# Patient Record
Sex: FEMALE | Race: BLACK OR AFRICAN AMERICAN | NOT HISPANIC OR LATINO | URBAN - METROPOLITAN AREA
[De-identification: names, ages, dates, MRNs, and addresses within clinical notes are randomized per-mention and may not be internally consistent; named-entity substitution may affect disease eponyms.]

---

## 2017-01-06 ENCOUNTER — OUTPATIENT (OUTPATIENT)
Dept: OUTPATIENT SERVICES | Facility: HOSPITAL | Age: 62
LOS: 1 days | End: 2017-01-06
Payer: COMMERCIAL

## 2017-01-06 ENCOUNTER — APPOINTMENT (OUTPATIENT)
Dept: THORACIC SURGERY | Facility: CLINIC | Age: 62
End: 2017-01-06

## 2017-01-06 VITALS
OXYGEN SATURATION: 97 % | WEIGHT: 187 LBS | HEIGHT: 69 IN | BODY MASS INDEX: 27.7 KG/M2 | RESPIRATION RATE: 18 BRPM | SYSTOLIC BLOOD PRESSURE: 119 MMHG | HEART RATE: 81 BPM | TEMPERATURE: 97.6 F | DIASTOLIC BLOOD PRESSURE: 78 MMHG

## 2017-01-06 DIAGNOSIS — Z98.890 OTHER SPECIFIED POSTPROCEDURAL STATES: Chronic | ICD-10-CM

## 2017-01-06 DIAGNOSIS — Z98.89 OTHER SPECIFIED POSTPROCEDURAL STATES: Chronic | ICD-10-CM

## 2017-01-06 DIAGNOSIS — Z41.9 ENCOUNTER FOR PROCEDURE FOR PURPOSES OTHER THAN REMEDYING HEALTH STATE, UNSPECIFIED: Chronic | ICD-10-CM

## 2017-01-06 PROCEDURE — 71020: CPT | Mod: 26

## 2017-01-06 PROCEDURE — 71046 X-RAY EXAM CHEST 2 VIEWS: CPT

## 2017-01-08 ENCOUNTER — RESULT CHARGE (OUTPATIENT)
Age: 62
End: 2017-01-08

## 2017-01-09 ENCOUNTER — OTHER (OUTPATIENT)
Age: 62
End: 2017-01-09

## 2017-01-09 ENCOUNTER — APPOINTMENT (OUTPATIENT)
Dept: OTOLARYNGOLOGY | Facility: CLINIC | Age: 62
End: 2017-01-09

## 2017-01-09 VITALS
WEIGHT: 187 LBS | BODY MASS INDEX: 27.7 KG/M2 | HEART RATE: 72 BPM | DIASTOLIC BLOOD PRESSURE: 83 MMHG | HEIGHT: 69 IN | SYSTOLIC BLOOD PRESSURE: 147 MMHG

## 2017-01-17 ENCOUNTER — OUTPATIENT (OUTPATIENT)
Dept: OUTPATIENT SERVICES | Facility: HOSPITAL | Age: 62
LOS: 1 days | End: 2017-01-17
Payer: COMMERCIAL

## 2017-01-17 DIAGNOSIS — C02.9 MALIGNANT NEOPLASM OF TONGUE, UNSPECIFIED: ICD-10-CM

## 2017-01-17 DIAGNOSIS — Z41.9 ENCOUNTER FOR PROCEDURE FOR PURPOSES OTHER THAN REMEDYING HEALTH STATE, UNSPECIFIED: Chronic | ICD-10-CM

## 2017-01-17 DIAGNOSIS — Z98.89 OTHER SPECIFIED POSTPROCEDURAL STATES: Chronic | ICD-10-CM

## 2017-01-17 DIAGNOSIS — Z98.890 OTHER SPECIFIED POSTPROCEDURAL STATES: Chronic | ICD-10-CM

## 2017-01-17 LAB
ALBUMIN SERPL ELPH-MCNC: 3.5 G/DL — SIGNIFICANT CHANGE UP (ref 3.4–5)
ALP SERPL-CCNC: 121 U/L — HIGH (ref 40–120)
ALT FLD-CCNC: 24 U/L — SIGNIFICANT CHANGE UP (ref 12–42)
ANION GAP SERPL CALC-SCNC: 9 MMOL/L — SIGNIFICANT CHANGE UP (ref 9–16)
APPEARANCE UR: CLEAR — SIGNIFICANT CHANGE UP
APTT BLD: 32.5 SEC — SIGNIFICANT CHANGE UP (ref 27.5–37.4)
AST SERPL-CCNC: 27 U/L — SIGNIFICANT CHANGE UP (ref 15–37)
BASOPHILS NFR BLD AUTO: 0.7 % — SIGNIFICANT CHANGE UP (ref 0–2)
BILIRUB SERPL-MCNC: 1 MG/DL — SIGNIFICANT CHANGE UP (ref 0.2–1.2)
BILIRUB UR-MCNC: NEGATIVE — SIGNIFICANT CHANGE UP
BUN SERPL-MCNC: 7 MG/DL — SIGNIFICANT CHANGE UP (ref 7–23)
CALCIUM SERPL-MCNC: 9.9 MG/DL — SIGNIFICANT CHANGE UP (ref 8.5–10.5)
CHLORIDE SERPL-SCNC: 102 MMOL/L — SIGNIFICANT CHANGE UP (ref 96–108)
CO2 SERPL-SCNC: 30 MMOL/L — SIGNIFICANT CHANGE UP (ref 22–31)
COLOR SPEC: YELLOW — SIGNIFICANT CHANGE UP
CREAT ?TM UR-MCNC: 102 MG/DL — SIGNIFICANT CHANGE UP
CREAT SERPL-MCNC: 0.77 MG/DL — SIGNIFICANT CHANGE UP (ref 0.5–1.3)
DIFF PNL FLD: NEGATIVE — SIGNIFICANT CHANGE UP
EOSINOPHIL NFR BLD AUTO: 1.1 % — SIGNIFICANT CHANGE UP (ref 0–6)
GLUCOSE SERPL-MCNC: 123 MG/DL — HIGH (ref 70–99)
GLUCOSE UR QL: NEGATIVE — SIGNIFICANT CHANGE UP
HCT VFR BLD CALC: 47.9 % — HIGH (ref 34.5–45)
HGB BLD-MCNC: 16.8 G/DL — HIGH (ref 11.5–15.5)
INR BLD: 1.07 — SIGNIFICANT CHANGE UP (ref 0.88–1.16)
KETONES UR-MCNC: NEGATIVE — SIGNIFICANT CHANGE UP
LEUKOCYTE ESTERASE UR-ACNC: NEGATIVE — SIGNIFICANT CHANGE UP
LYMPHOCYTES # BLD AUTO: 16 % — SIGNIFICANT CHANGE UP (ref 13–44)
MAGNESIUM SERPL-MCNC: 1.5 MG/DL — LOW (ref 1.6–2.4)
MCHC RBC-ENTMCNC: 35.1 G/DL — SIGNIFICANT CHANGE UP (ref 32–36)
MCHC RBC-ENTMCNC: 36.8 PG — HIGH (ref 27–34)
MCV RBC AUTO: 104.8 FL — HIGH (ref 80–100)
MONOCYTES NFR BLD AUTO: 9.8 % — SIGNIFICANT CHANGE UP (ref 2–14)
NEUTROPHILS NFR BLD AUTO: 72.4 % — SIGNIFICANT CHANGE UP (ref 43–77)
NITRITE UR-MCNC: NEGATIVE — SIGNIFICANT CHANGE UP
PH UR: 5.5 — SIGNIFICANT CHANGE UP (ref 4–8)
PLATELET # BLD AUTO: 298 K/UL — SIGNIFICANT CHANGE UP (ref 150–400)
POTASSIUM SERPL-MCNC: 4 MMOL/L — SIGNIFICANT CHANGE UP (ref 3.5–5.3)
POTASSIUM SERPL-SCNC: 4 MMOL/L — SIGNIFICANT CHANGE UP (ref 3.5–5.3)
PROT ?TM UR-MCNC: 12 MG/DL — SIGNIFICANT CHANGE UP
PROT SERPL-MCNC: 8.2 G/DL — SIGNIFICANT CHANGE UP (ref 6.4–8.2)
PROT UR-MCNC: NEGATIVE MG/DL — SIGNIFICANT CHANGE UP
PROTHROM AB SERPL-ACNC: 11.9 SEC — SIGNIFICANT CHANGE UP (ref 10–13.1)
RBC # BLD: 4.57 M/UL — SIGNIFICANT CHANGE UP (ref 3.8–5.2)
RBC # FLD: 14.6 % — SIGNIFICANT CHANGE UP (ref 10.3–16.9)
SODIUM SERPL-SCNC: 141 MMOL/L — SIGNIFICANT CHANGE UP (ref 135–145)
SP GR SPEC: 1.01 — SIGNIFICANT CHANGE UP (ref 1–1.03)
URATE SERPL-MCNC: 6.4 MG/DL — HIGH (ref 2.6–6)
UROBILINOGEN FLD QL: 0.2 E.U./DL — SIGNIFICANT CHANGE UP
WBC # BLD: 10.1 K/UL — SIGNIFICANT CHANGE UP (ref 3.8–10.5)
WBC # FLD AUTO: 10.1 K/UL — SIGNIFICANT CHANGE UP (ref 3.8–10.5)

## 2017-01-17 PROCEDURE — 85610 PROTHROMBIN TIME: CPT

## 2017-01-17 PROCEDURE — 85025 COMPLETE CBC W/AUTO DIFF WBC: CPT

## 2017-01-17 PROCEDURE — 81003 URINALYSIS AUTO W/O SCOPE: CPT

## 2017-01-17 PROCEDURE — 80053 COMPREHEN METABOLIC PANEL: CPT

## 2017-01-17 PROCEDURE — 93010 ELECTROCARDIOGRAM REPORT: CPT

## 2017-01-17 PROCEDURE — 85730 THROMBOPLASTIN TIME PARTIAL: CPT

## 2017-01-17 PROCEDURE — 93005 ELECTROCARDIOGRAM TRACING: CPT

## 2017-01-17 PROCEDURE — 84550 ASSAY OF BLOOD/URIC ACID: CPT

## 2017-01-17 PROCEDURE — 83735 ASSAY OF MAGNESIUM: CPT

## 2017-01-17 PROCEDURE — 82570 ASSAY OF URINE CREATININE: CPT

## 2017-01-17 PROCEDURE — 84156 ASSAY OF PROTEIN URINE: CPT

## 2017-01-20 ENCOUNTER — APPOINTMENT (OUTPATIENT)
Dept: NEPHROLOGY | Facility: CLINIC | Age: 62
End: 2017-01-20

## 2017-01-20 VITALS — HEART RATE: 68 BPM | DIASTOLIC BLOOD PRESSURE: 70 MMHG | SYSTOLIC BLOOD PRESSURE: 120 MMHG

## 2017-01-20 DIAGNOSIS — Z87.09 PERSONAL HISTORY OF OTHER DISEASES OF THE RESPIRATORY SYSTEM: ICD-10-CM

## 2017-01-20 DIAGNOSIS — Z01.818 ENCOUNTER FOR OTHER PREPROCEDURAL EXAMINATION: ICD-10-CM

## 2017-01-21 ENCOUNTER — FORM ENCOUNTER (OUTPATIENT)
Age: 62
End: 2017-01-21

## 2017-01-22 ENCOUNTER — OUTPATIENT (OUTPATIENT)
Dept: OUTPATIENT SERVICES | Facility: HOSPITAL | Age: 62
LOS: 1 days | End: 2017-01-22
Payer: COMMERCIAL

## 2017-01-22 DIAGNOSIS — Z98.89 OTHER SPECIFIED POSTPROCEDURAL STATES: Chronic | ICD-10-CM

## 2017-01-22 DIAGNOSIS — Z98.890 OTHER SPECIFIED POSTPROCEDURAL STATES: Chronic | ICD-10-CM

## 2017-01-22 DIAGNOSIS — Z41.9 ENCOUNTER FOR PROCEDURE FOR PURPOSES OTHER THAN REMEDYING HEALTH STATE, UNSPECIFIED: Chronic | ICD-10-CM

## 2017-01-22 PROCEDURE — 70543 MRI ORBT/FAC/NCK W/O &W/DYE: CPT | Mod: 26

## 2017-01-22 PROCEDURE — A9585: CPT

## 2017-01-22 PROCEDURE — 70543 MRI ORBT/FAC/NCK W/O &W/DYE: CPT

## 2017-01-24 ENCOUNTER — MEDICATION RENEWAL (OUTPATIENT)
Age: 62
End: 2017-01-24

## 2017-02-01 ENCOUNTER — RX RENEWAL (OUTPATIENT)
Age: 62
End: 2017-02-01

## 2017-02-01 ENCOUNTER — RESULT REVIEW (OUTPATIENT)
Age: 62
End: 2017-02-01

## 2017-02-01 NOTE — ASU PATIENT PROFILE, ADULT - PSH
Elective surgery  lymph node excision april 28 2014  H/O right knee surgery    History of lumpectomy of right breast    History of surgery  tongue surgery sept 2016  History of thoracic surgery  R VATS RA RUL wedge resection with LN bx; nov 2016; & Dec 2016

## 2017-02-02 ENCOUNTER — APPOINTMENT (OUTPATIENT)
Dept: OTOLARYNGOLOGY | Facility: HOSPITAL | Age: 62
End: 2017-02-02

## 2017-02-02 ENCOUNTER — OUTPATIENT (OUTPATIENT)
Dept: OUTPATIENT SERVICES | Facility: HOSPITAL | Age: 62
LOS: 1 days | Discharge: HOME | End: 2017-02-02
Payer: COMMERCIAL

## 2017-02-02 VITALS
WEIGHT: 190.04 LBS | TEMPERATURE: 209 F | HEIGHT: 69.5 IN | SYSTOLIC BLOOD PRESSURE: 132 MMHG | HEART RATE: 67 BPM | OXYGEN SATURATION: 95 % | RESPIRATION RATE: 16 BRPM | DIASTOLIC BLOOD PRESSURE: 61 MMHG

## 2017-02-02 DIAGNOSIS — Z98.89 OTHER SPECIFIED POSTPROCEDURAL STATES: Chronic | ICD-10-CM

## 2017-02-02 DIAGNOSIS — Z98.890 OTHER SPECIFIED POSTPROCEDURAL STATES: Chronic | ICD-10-CM

## 2017-02-02 DIAGNOSIS — Z41.9 ENCOUNTER FOR PROCEDURE FOR PURPOSES OTHER THAN REMEDYING HEALTH STATE, UNSPECIFIED: Chronic | ICD-10-CM

## 2017-02-02 PROCEDURE — 41120 PARTIAL REMOVAL OF TONGUE: CPT

## 2017-02-02 RX ORDER — AMPICILLIN SODIUM AND SULBACTAM SODIUM 250; 125 MG/ML; MG/ML
1.5 INJECTION, POWDER, FOR SUSPENSION INTRAMUSCULAR; INTRAVENOUS EVERY 6 HOURS
Qty: 0 | Refills: 0 | Status: DISCONTINUED | OUTPATIENT
Start: 2017-02-02 | End: 2017-02-03

## 2017-02-02 RX ORDER — ACETAMINOPHEN 500 MG
650 TABLET ORAL EVERY 6 HOURS
Qty: 0 | Refills: 0 | Status: DISCONTINUED | OUTPATIENT
Start: 2017-02-02 | End: 2017-02-03

## 2017-02-02 RX ORDER — DEXAMETHASONE 0.5 MG/5ML
6 ELIXIR ORAL EVERY 6 HOURS
Qty: 0 | Refills: 0 | Status: COMPLETED | OUTPATIENT
Start: 2017-02-02 | End: 2017-02-03

## 2017-02-02 RX ORDER — MORPHINE SULFATE 50 MG/1
4 CAPSULE, EXTENDED RELEASE ORAL EVERY 6 HOURS
Qty: 0 | Refills: 0 | Status: DISCONTINUED | OUTPATIENT
Start: 2017-02-02 | End: 2017-02-03

## 2017-02-02 RX ORDER — SODIUM CHLORIDE 9 MG/ML
1000 INJECTION, SOLUTION INTRAVENOUS
Qty: 0 | Refills: 0 | Status: DISCONTINUED | OUTPATIENT
Start: 2017-02-02 | End: 2017-02-03

## 2017-02-02 RX ORDER — ALBUTEROL 90 UG/1
2 AEROSOL, METERED ORAL EVERY 6 HOURS
Qty: 0 | Refills: 0 | Status: DISCONTINUED | OUTPATIENT
Start: 2017-02-02 | End: 2017-02-03

## 2017-02-02 RX ORDER — AMLODIPINE BESYLATE 2.5 MG/1
10 TABLET ORAL DAILY
Qty: 0 | Refills: 0 | Status: DISCONTINUED | OUTPATIENT
Start: 2017-02-02 | End: 2017-02-03

## 2017-02-02 RX ORDER — FLUTICASONE PROPIONATE AND SALMETEROL 50; 250 UG/1; UG/1
1 POWDER ORAL; RESPIRATORY (INHALATION)
Qty: 0 | Refills: 0 | Status: DISCONTINUED | OUTPATIENT
Start: 2017-02-02 | End: 2017-02-03

## 2017-02-02 RX ORDER — METOPROLOL TARTRATE 50 MG
100 TABLET ORAL DAILY
Qty: 0 | Refills: 0 | Status: DISCONTINUED | OUTPATIENT
Start: 2017-02-02 | End: 2017-02-03

## 2017-02-02 RX ORDER — GABAPENTIN 400 MG/1
300 CAPSULE ORAL THREE TIMES A DAY
Qty: 0 | Refills: 0 | Status: DISCONTINUED | OUTPATIENT
Start: 2017-02-02 | End: 2017-02-03

## 2017-02-02 RX ORDER — ALBUTEROL 90 UG/1
2.5 AEROSOL, METERED ORAL ONCE
Qty: 0 | Refills: 0 | Status: COMPLETED | OUTPATIENT
Start: 2017-02-02 | End: 2017-02-02

## 2017-02-02 RX ORDER — PANTOPRAZOLE SODIUM 20 MG/1
40 TABLET, DELAYED RELEASE ORAL
Qty: 0 | Refills: 0 | Status: DISCONTINUED | OUTPATIENT
Start: 2017-02-02 | End: 2017-02-03

## 2017-02-02 RX ORDER — OXYCODONE HYDROCHLORIDE 5 MG/1
5 TABLET ORAL EVERY 6 HOURS
Qty: 0 | Refills: 0 | Status: DISCONTINUED | OUTPATIENT
Start: 2017-02-02 | End: 2017-02-03

## 2017-02-02 RX ORDER — ONDANSETRON 8 MG/1
4 TABLET, FILM COATED ORAL EVERY 6 HOURS
Qty: 0 | Refills: 0 | Status: DISCONTINUED | OUTPATIENT
Start: 2017-02-02 | End: 2017-02-03

## 2017-02-02 RX ORDER — MORPHINE SULFATE 50 MG/1
2 CAPSULE, EXTENDED RELEASE ORAL
Qty: 0 | Refills: 0 | Status: DISCONTINUED | OUTPATIENT
Start: 2017-02-02 | End: 2017-02-03

## 2017-02-02 RX ADMIN — MORPHINE SULFATE 2 MILLIGRAM(S): 50 CAPSULE, EXTENDED RELEASE ORAL at 19:49

## 2017-02-02 RX ADMIN — MORPHINE SULFATE 2 MILLIGRAM(S): 50 CAPSULE, EXTENDED RELEASE ORAL at 22:29

## 2017-02-02 RX ADMIN — ALBUTEROL 2.5 MILLIGRAM(S): 90 AEROSOL, METERED ORAL at 20:40

## 2017-02-02 RX ADMIN — MORPHINE SULFATE 4 MILLIGRAM(S): 50 CAPSULE, EXTENDED RELEASE ORAL at 21:10

## 2017-02-02 RX ADMIN — MORPHINE SULFATE 4 MILLIGRAM(S): 50 CAPSULE, EXTENDED RELEASE ORAL at 20:53

## 2017-02-02 RX ADMIN — MORPHINE SULFATE 2 MILLIGRAM(S): 50 CAPSULE, EXTENDED RELEASE ORAL at 19:35

## 2017-02-02 NOTE — PROGRESS NOTE ADULT - SUBJECTIVE AND OBJECTIVE BOX
ENT [facility] POST OP CHECK    No acute events post op. Pain controlled. Denies nausea/vomiting.       MEDICATIONS:  Antiinfectives:   ampicillin/sulbactam  IVPB 1.5Gram(s) IV Intermittent every 6 hours    IV fluids:  lactated ringers. 1000milliLiter(s) IV Continuous <Continuous>    Hematologic/Anticoagulation:    Pain medications/Neuro:  morphine  - Injectable 2milliGRAM(s) IV Push every 15 minutes PRN  ondansetron Injectable 4milliGRAM(s) IV Push every 6 hours PRN  gabapentin 300milliGRAM(s) Oral three times a day  acetaminophen    Suspension. 650milliGRAM(s) Oral every 6 hours PRN  oxyCODONE Solution 5milliGRAM(s) Oral every 6 hours PRN  morphine  - Injectable 4milliGRAM(s) IV Push every 6 hours PRN    Endocrine Medications:   dexamethasone  Injectable 6milliGRAM(s) IV Push every 6 hours    All other standing medications:   metoprolol succinate ER 100milliGRAM(s) Oral daily  amLODIPine   Tablet 10milliGRAM(s) Oral daily  pantoprazole    Tablet 40milliGRAM(s) Oral before breakfast  fluticasone / salmeterol 250-50 MICROgram(s) Diskus 1Dose(s) Inhalation two times a day    All other PRN medications:  ALBUTerol    90 MICROgram(s) HFA Inhaler 2Puff(s) Inhalation every 6 hours PRN      Vital Signs Last 24 Hrs  T(C): 98.4, Max: 98.4 (02-02 @ 11:19)  T(F): 209.1, Max: 209.1 (02-02 @ 11:19)  HR: 79 (67 - 86)  BP: 153/84 (128/87 - 172/89)  BP(mean): --  RR: 16 (14 - 16)  SpO2: 92% (92% - 99%)          PHYSICAL EXAM:  NAD  Breathing comfortably  OC/OP: suture line intact. Minimal blood in OC.      Assessment/Plan:  62y Female s/p right partial glossectomy.   - SDU  - unasyn  - decadron x 2  - pain control  - anti-emetics prn  - full liquid diet  - saline rinses after meals  - ambulate  - discussed case with dr lo who also examined the patient and agrees with the plan

## 2017-02-03 VITALS — TEMPERATURE: 97 F

## 2017-02-03 PROCEDURE — 41113 EXCISION OF TONGUE LESION: CPT

## 2017-02-03 PROCEDURE — 88331 PATH CONSLTJ SURG 1 BLK 1SPC: CPT

## 2017-02-03 PROCEDURE — 94640 AIRWAY INHALATION TREATMENT: CPT

## 2017-02-03 PROCEDURE — 41112 EXCISION OF TONGUE LESION: CPT

## 2017-02-03 PROCEDURE — 88305 TISSUE EXAM BY PATHOLOGIST: CPT

## 2017-02-03 PROCEDURE — 88309 TISSUE EXAM BY PATHOLOGIST: CPT

## 2017-02-03 PROCEDURE — C1889: CPT

## 2017-02-03 RX ADMIN — MORPHINE SULFATE 4 MILLIGRAM(S): 50 CAPSULE, EXTENDED RELEASE ORAL at 12:03

## 2017-02-03 RX ADMIN — Medication 6 MILLIGRAM(S): at 01:28

## 2017-02-03 RX ADMIN — Medication 100 MILLIGRAM(S): at 07:26

## 2017-02-03 RX ADMIN — Medication 6 MILLIGRAM(S): at 07:27

## 2017-02-03 RX ADMIN — PANTOPRAZOLE SODIUM 40 MILLIGRAM(S): 20 TABLET, DELAYED RELEASE ORAL at 07:26

## 2017-02-03 RX ADMIN — AMPICILLIN SODIUM AND SULBACTAM SODIUM 100 GRAM(S): 250; 125 INJECTION, POWDER, FOR SUSPENSION INTRAMUSCULAR; INTRAVENOUS at 12:03

## 2017-02-03 RX ADMIN — AMPICILLIN SODIUM AND SULBACTAM SODIUM 100 GRAM(S): 250; 125 INJECTION, POWDER, FOR SUSPENSION INTRAMUSCULAR; INTRAVENOUS at 07:26

## 2017-02-03 RX ADMIN — OXYCODONE HYDROCHLORIDE 5 MILLIGRAM(S): 5 TABLET ORAL at 00:18

## 2017-02-03 RX ADMIN — MORPHINE SULFATE 4 MILLIGRAM(S): 50 CAPSULE, EXTENDED RELEASE ORAL at 02:52

## 2017-02-03 RX ADMIN — MORPHINE SULFATE 4 MILLIGRAM(S): 50 CAPSULE, EXTENDED RELEASE ORAL at 13:56

## 2017-02-03 RX ADMIN — GABAPENTIN 300 MILLIGRAM(S): 400 CAPSULE ORAL at 07:26

## 2017-02-03 RX ADMIN — GABAPENTIN 300 MILLIGRAM(S): 400 CAPSULE ORAL at 00:18

## 2017-02-03 RX ADMIN — OXYCODONE HYDROCHLORIDE 5 MILLIGRAM(S): 5 TABLET ORAL at 07:26

## 2017-02-03 RX ADMIN — OXYCODONE HYDROCHLORIDE 5 MILLIGRAM(S): 5 TABLET ORAL at 08:26

## 2017-02-03 RX ADMIN — AMLODIPINE BESYLATE 10 MILLIGRAM(S): 2.5 TABLET ORAL at 07:26

## 2017-02-03 RX ADMIN — FLUTICASONE PROPIONATE AND SALMETEROL 1 DOSE(S): 50; 250 POWDER ORAL; RESPIRATORY (INHALATION) at 07:27

## 2017-02-03 RX ADMIN — AMPICILLIN SODIUM AND SULBACTAM SODIUM 100 GRAM(S): 250; 125 INJECTION, POWDER, FOR SUSPENSION INTRAMUSCULAR; INTRAVENOUS at 00:18

## 2017-02-03 RX ADMIN — OXYCODONE HYDROCHLORIDE 5 MILLIGRAM(S): 5 TABLET ORAL at 01:18

## 2017-02-03 RX ADMIN — MORPHINE SULFATE 4 MILLIGRAM(S): 50 CAPSULE, EXTENDED RELEASE ORAL at 03:52

## 2017-02-03 NOTE — PROVIDER CONTACT NOTE (CHANGE IN STATUS NOTIFICATION) - ASSESSMENT
ENT resident contacted overnight and pt given more time to urinate when pt did not initially void at 4am. Pt placed in bathroom, and warm water ran, hands placed in warm water, relaxation encouraged. Pt did not void. Upon bladder scan 269cc seen.

## 2017-02-03 NOTE — PROGRESS NOTE ADULT - ASSESSMENT
Assessment/Plan:  62y Female s/p excision of right oral tongue lesion, POD#1. Recovering well.   -Repeat TOV  -advance to soft diet  -pain control  -ambulate    - d/w Dr. vasquez whom agrees with the above plan

## 2017-02-03 NOTE — PROGRESS NOTE ADULT - SUBJECTIVE AND OBJECTIVE BOX
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 No acute events O/N. tolerating full liquids. Failed TOV overnight.     Allergies    No Known Allergies    Intolerances      MEDICATIONS:  Antiinfectives:   ampicillin/sulbactam  IVPB 1.5Gram(s) IV Intermittent every 6 hours    IV fluids:  lactated ringers. 1000milliLiter(s) IV Continuous <Continuous>    Hematologic/Anticoagulation:    Pain medications/Neuro:  ondansetron Injectable 4milliGRAM(s) IV Push every 6 hours PRN  gabapentin 300milliGRAM(s) Oral three times a day  acetaminophen    Suspension. 650milliGRAM(s) Oral every 6 hours PRN  oxyCODONE Solution 5milliGRAM(s) Oral every 6 hours PRN  morphine  - Injectable 4milliGRAM(s) IV Push every 6 hours PRN    Endocrine Medications:     All other standing medications:   metoprolol succinate ER 100milliGRAM(s) Oral daily  amLODIPine   Tablet 10milliGRAM(s) Oral daily  pantoprazole    Tablet 40milliGRAM(s) Oral before breakfast  fluticasone / salmeterol 250-50 MICROgram(s) Diskus 1Dose(s) Inhalation two times a day    All other PRN medications:  ALBUTerol    90 MICROgram(s) HFA Inhaler 2Puff(s) Inhalation every 6 hours PRN      Vital Signs Last 24 Hrs  T(C): 36.2, Max: 36.8 (02-03 @ 02:26)  T(F): 97.2, Max: 98.3 (02-03 @ 02:26)  HR: 78 (69 - 90)  BP: 144/63 (116/62 - 173/89)  BP(mean): 82 (82 - 98)  RR: 16 (14 - 18)  SpO2: 96% (92% - 99%)    I & Os for 24h ending 02-03 @ 07:00  =============================================  IN:    lactated ringers.: 1000 ml    Total IN: 1000 ml  ---------------------------------------------  OUT:    Total OUT: 0 ml  ---------------------------------------------  Total NET: 1000 ml    I & Os for current day (as of 02-03 @ 16:40)  =============================================  IN:    Oral Fluid: 720 ml    lactated ringers.: 400 ml    Total IN: 1120 ml  ---------------------------------------------  OUT:    Voided: 625 ml    Total OUT: 625 ml  ---------------------------------------------  Total NET: 495 ml        PHYSICAL EXAM:  NAD  Breathing comfortably  OC/OP: suture line intact. Minimal blood in OC.      Assessment/Plan:  62y Female s/p excision of right oral tongue lesion, POD#1. Recovering well.   -Repeat TOV  -advance to soft diet  -pain control  -ambulate    - d/w Dr. vasquez whom agrees with the above plan

## 2017-02-08 LAB — SURGICAL PATHOLOGY STUDY: SIGNIFICANT CHANGE UP

## 2017-02-09 DIAGNOSIS — C02.9 MALIGNANT NEOPLASM OF TONGUE, UNSPECIFIED: ICD-10-CM

## 2017-02-09 DIAGNOSIS — J44.9 CHRONIC OBSTRUCTIVE PULMONARY DISEASE, UNSPECIFIED: ICD-10-CM

## 2017-02-09 DIAGNOSIS — I10 ESSENTIAL (PRIMARY) HYPERTENSION: ICD-10-CM

## 2017-02-09 DIAGNOSIS — C78.00 SECONDARY MALIGNANT NEOPLASM OF UNSPECIFIED LUNG: ICD-10-CM

## 2017-02-13 ENCOUNTER — APPOINTMENT (OUTPATIENT)
Dept: OTOLARYNGOLOGY | Facility: CLINIC | Age: 62
End: 2017-02-13

## 2017-02-13 VITALS
DIASTOLIC BLOOD PRESSURE: 79 MMHG | SYSTOLIC BLOOD PRESSURE: 124 MMHG | HEIGHT: 69 IN | BODY MASS INDEX: 27.7 KG/M2 | TEMPERATURE: 98.7 F | WEIGHT: 187 LBS | HEART RATE: 61 BPM

## 2017-02-27 ENCOUNTER — APPOINTMENT (OUTPATIENT)
Dept: OTOLARYNGOLOGY | Facility: CLINIC | Age: 62
End: 2017-02-27

## 2017-02-27 VITALS
HEIGHT: 69 IN | WEIGHT: 187 LBS | DIASTOLIC BLOOD PRESSURE: 67 MMHG | TEMPERATURE: 98.8 F | SYSTOLIC BLOOD PRESSURE: 107 MMHG | HEART RATE: 84 BPM | BODY MASS INDEX: 27.7 KG/M2

## 2017-03-28 ENCOUNTER — MEDICATION RENEWAL (OUTPATIENT)
Age: 62
End: 2017-03-28

## 2017-04-03 ENCOUNTER — APPOINTMENT (OUTPATIENT)
Dept: OTOLARYNGOLOGY | Facility: CLINIC | Age: 62
End: 2017-04-03

## 2017-04-03 VITALS
BODY MASS INDEX: 27.7 KG/M2 | SYSTOLIC BLOOD PRESSURE: 110 MMHG | HEART RATE: 94 BPM | TEMPERATURE: 98.5 F | WEIGHT: 187 LBS | DIASTOLIC BLOOD PRESSURE: 72 MMHG | HEIGHT: 69 IN

## 2017-04-18 ENCOUNTER — FORM ENCOUNTER (OUTPATIENT)
Age: 62
End: 2017-04-18

## 2017-04-21 ENCOUNTER — APPOINTMENT (OUTPATIENT)
Dept: THORACIC SURGERY | Facility: CLINIC | Age: 62
End: 2017-04-21

## 2017-04-21 ENCOUNTER — OUTPATIENT (OUTPATIENT)
Dept: OUTPATIENT SERVICES | Facility: HOSPITAL | Age: 62
LOS: 1 days | End: 2017-04-21
Payer: COMMERCIAL

## 2017-04-21 VITALS
HEART RATE: 73 BPM | BODY MASS INDEX: 27.62 KG/M2 | SYSTOLIC BLOOD PRESSURE: 135 MMHG | OXYGEN SATURATION: 97 % | WEIGHT: 187 LBS | TEMPERATURE: 97.3 F | DIASTOLIC BLOOD PRESSURE: 67 MMHG | RESPIRATION RATE: 18 BRPM

## 2017-04-21 DIAGNOSIS — K76.0 FATTY (CHANGE OF) LIVER, NOT ELSEWHERE CLASSIFIED: ICD-10-CM

## 2017-04-21 DIAGNOSIS — H91.92 UNSPECIFIED HEARING LOSS, LEFT EAR: ICD-10-CM

## 2017-04-21 DIAGNOSIS — Z41.9 ENCOUNTER FOR PROCEDURE FOR PURPOSES OTHER THAN REMEDYING HEALTH STATE, UNSPECIFIED: Chronic | ICD-10-CM

## 2017-04-21 DIAGNOSIS — Z98.89 OTHER SPECIFIED POSTPROCEDURAL STATES: Chronic | ICD-10-CM

## 2017-04-21 DIAGNOSIS — Z98.890 OTHER SPECIFIED POSTPROCEDURAL STATES: Chronic | ICD-10-CM

## 2017-04-21 PROCEDURE — 71020: CPT | Mod: 26

## 2017-04-21 PROCEDURE — 71046 X-RAY EXAM CHEST 2 VIEWS: CPT

## 2017-04-21 RX ORDER — AMOXICILLIN 500 MG/1
500 TABLET, FILM COATED ORAL TWICE DAILY
Qty: 10 | Refills: 0 | Status: COMPLETED | COMMUNITY
Start: 2017-02-01 | End: 2017-04-21

## 2017-04-21 RX ORDER — ACETAMINOPHEN AND CODEINE PHOSPHATE 300; 30 MG/1; MG/1
300-30 TABLET ORAL
Qty: 20 | Refills: 0 | Status: COMPLETED | COMMUNITY
Start: 2017-02-01 | End: 2017-04-21

## 2017-04-24 ENCOUNTER — FORM ENCOUNTER (OUTPATIENT)
Age: 62
End: 2017-04-24

## 2017-04-25 ENCOUNTER — OUTPATIENT (OUTPATIENT)
Dept: OUTPATIENT SERVICES | Facility: HOSPITAL | Age: 62
LOS: 1 days | End: 2017-04-25
Payer: COMMERCIAL

## 2017-04-25 DIAGNOSIS — Z98.890 OTHER SPECIFIED POSTPROCEDURAL STATES: Chronic | ICD-10-CM

## 2017-04-25 DIAGNOSIS — Z98.89 OTHER SPECIFIED POSTPROCEDURAL STATES: Chronic | ICD-10-CM

## 2017-04-25 DIAGNOSIS — Z41.9 ENCOUNTER FOR PROCEDURE FOR PURPOSES OTHER THAN REMEDYING HEALTH STATE, UNSPECIFIED: Chronic | ICD-10-CM

## 2017-04-25 PROCEDURE — A9585: CPT

## 2017-04-25 PROCEDURE — 70543 MRI ORBT/FAC/NCK W/O &W/DYE: CPT | Mod: 26

## 2017-04-25 PROCEDURE — 70543 MRI ORBT/FAC/NCK W/O &W/DYE: CPT

## 2017-05-09 ENCOUNTER — INPATIENT (INPATIENT)
Facility: HOSPITAL | Age: 62
LOS: 2 days | Discharge: HOME HEALTH | DRG: 184 | End: 2017-05-12
Attending: INTERNAL MEDICINE | Admitting: INTERNAL MEDICINE
Payer: COMMERCIAL

## 2017-05-09 ENCOUNTER — OUTPATIENT (OUTPATIENT)
Dept: OUTPATIENT SERVICES | Facility: HOSPITAL | Age: 62
LOS: 1 days | End: 2017-05-09
Payer: COMMERCIAL

## 2017-05-09 VITALS
OXYGEN SATURATION: 88 % | SYSTOLIC BLOOD PRESSURE: 193 MMHG | TEMPERATURE: 98 F | HEART RATE: 78 BPM | RESPIRATION RATE: 18 BRPM | DIASTOLIC BLOOD PRESSURE: 83 MMHG | WEIGHT: 190.26 LBS

## 2017-05-09 DIAGNOSIS — E78.00 PURE HYPERCHOLESTEROLEMIA, UNSPECIFIED: ICD-10-CM

## 2017-05-09 DIAGNOSIS — Z98.89 OTHER SPECIFIED POSTPROCEDURAL STATES: Chronic | ICD-10-CM

## 2017-05-09 DIAGNOSIS — Z29.9 ENCOUNTER FOR PROPHYLACTIC MEASURES, UNSPECIFIED: ICD-10-CM

## 2017-05-09 DIAGNOSIS — Z98.890 OTHER SPECIFIED POSTPROCEDURAL STATES: Chronic | ICD-10-CM

## 2017-05-09 DIAGNOSIS — R91.1 SOLITARY PULMONARY NODULE: ICD-10-CM

## 2017-05-09 DIAGNOSIS — Z41.9 ENCOUNTER FOR PROCEDURE FOR PURPOSES OTHER THAN REMEDYING HEALTH STATE, UNSPECIFIED: Chronic | ICD-10-CM

## 2017-05-09 DIAGNOSIS — S22.42XA MULTIPLE FRACTURES OF RIBS, LEFT SIDE, INITIAL ENCOUNTER FOR CLOSED FRACTURE: ICD-10-CM

## 2017-05-09 DIAGNOSIS — J44.9 CHRONIC OBSTRUCTIVE PULMONARY DISEASE, UNSPECIFIED: ICD-10-CM

## 2017-05-09 DIAGNOSIS — K14.8 OTHER DISEASES OF TONGUE: ICD-10-CM

## 2017-05-09 DIAGNOSIS — I10 ESSENTIAL (PRIMARY) HYPERTENSION: ICD-10-CM

## 2017-05-09 DIAGNOSIS — R09.02 HYPOXEMIA: ICD-10-CM

## 2017-05-09 DIAGNOSIS — G62.9 POLYNEUROPATHY, UNSPECIFIED: ICD-10-CM

## 2017-05-09 DIAGNOSIS — R63.8 OTHER SYMPTOMS AND SIGNS CONCERNING FOOD AND FLUID INTAKE: ICD-10-CM

## 2017-05-09 DIAGNOSIS — C50.919 MALIGNANT NEOPLASM OF UNSPECIFIED SITE OF UNSPECIFIED FEMALE BREAST: ICD-10-CM

## 2017-05-09 LAB
ALBUMIN SERPL ELPH-MCNC: 3.1 G/DL — LOW (ref 3.4–5)
ALP SERPL-CCNC: 116 U/L — SIGNIFICANT CHANGE UP (ref 40–120)
ALT FLD-CCNC: 26 U/L — SIGNIFICANT CHANGE UP (ref 12–42)
ANION GAP SERPL CALC-SCNC: 6 MMOL/L — LOW (ref 9–16)
AST SERPL-CCNC: 36 U/L — SIGNIFICANT CHANGE UP (ref 15–37)
BASOPHILS NFR BLD AUTO: 0.4 % — SIGNIFICANT CHANGE UP (ref 0–2)
BILIRUB SERPL-MCNC: 1.1 MG/DL — SIGNIFICANT CHANGE UP (ref 0.2–1.2)
BUN SERPL-MCNC: 7 MG/DL — SIGNIFICANT CHANGE UP (ref 7–23)
CALCIUM SERPL-MCNC: 9.1 MG/DL — SIGNIFICANT CHANGE UP (ref 8.5–10.5)
CHLORIDE SERPL-SCNC: 103 MMOL/L — SIGNIFICANT CHANGE UP (ref 96–108)
CO2 SERPL-SCNC: 30 MMOL/L — SIGNIFICANT CHANGE UP (ref 22–31)
CREAT SERPL-MCNC: 0.54 MG/DL — SIGNIFICANT CHANGE UP (ref 0.5–1.3)
EOSINOPHIL NFR BLD AUTO: 1.1 % — SIGNIFICANT CHANGE UP (ref 0–6)
GLUCOSE SERPL-MCNC: 140 MG/DL — HIGH (ref 70–99)
HCT VFR BLD CALC: 39.8 % — SIGNIFICANT CHANGE UP (ref 34.5–45)
HGB BLD-MCNC: 14.4 G/DL — SIGNIFICANT CHANGE UP (ref 11.5–15.5)
LYMPHOCYTES # BLD AUTO: 15 % — SIGNIFICANT CHANGE UP (ref 13–44)
MCHC RBC-ENTMCNC: 36.2 G/DL — HIGH (ref 32–36)
MCHC RBC-ENTMCNC: 36.8 PG — HIGH (ref 27–34)
MCV RBC AUTO: 101.8 FL — HIGH (ref 80–100)
MONOCYTES NFR BLD AUTO: 12.2 % — SIGNIFICANT CHANGE UP (ref 2–14)
NEUTROPHILS NFR BLD AUTO: 71.3 % — SIGNIFICANT CHANGE UP (ref 43–77)
PLATELET # BLD AUTO: 264 K/UL — SIGNIFICANT CHANGE UP (ref 150–400)
POTASSIUM SERPL-MCNC: 4.1 MMOL/L — SIGNIFICANT CHANGE UP (ref 3.5–5.3)
POTASSIUM SERPL-SCNC: 4.1 MMOL/L — SIGNIFICANT CHANGE UP (ref 3.5–5.3)
PROT SERPL-MCNC: 7.6 G/DL — SIGNIFICANT CHANGE UP (ref 6.4–8.2)
RBC # BLD: 3.91 M/UL — SIGNIFICANT CHANGE UP (ref 3.8–5.2)
RBC # FLD: 13.9 % — SIGNIFICANT CHANGE UP (ref 10.3–16.9)
SODIUM SERPL-SCNC: 139 MMOL/L — SIGNIFICANT CHANGE UP (ref 135–145)
WBC # BLD: 8.3 K/UL — SIGNIFICANT CHANGE UP (ref 3.8–10.5)
WBC # FLD AUTO: 8.3 K/UL — SIGNIFICANT CHANGE UP (ref 3.8–10.5)

## 2017-05-09 PROCEDURE — 71010: CPT | Mod: 26,59

## 2017-05-09 PROCEDURE — 99285 EMERGENCY DEPT VISIT HI MDM: CPT | Mod: 25

## 2017-05-09 PROCEDURE — 71110 X-RAY EXAM RIBS BIL 3 VIEWS: CPT | Mod: 26

## 2017-05-09 PROCEDURE — 93010 ELECTROCARDIOGRAM REPORT: CPT | Mod: NC

## 2017-05-09 PROCEDURE — 99223 1ST HOSP IP/OBS HIGH 75: CPT

## 2017-05-09 PROCEDURE — 71110 X-RAY EXAM RIBS BIL 3 VIEWS: CPT

## 2017-05-09 PROCEDURE — 93970 EXTREMITY STUDY: CPT | Mod: 26

## 2017-05-09 RX ORDER — EXEMESTANE 25 MG/1
25 TABLET, SUGAR COATED ORAL
Qty: 0 | Refills: 0 | Status: DISCONTINUED | OUTPATIENT
Start: 2017-05-09 | End: 2017-05-12

## 2017-05-09 RX ORDER — METOPROLOL TARTRATE 50 MG
100 TABLET ORAL DAILY
Qty: 0 | Refills: 0 | Status: DISCONTINUED | OUTPATIENT
Start: 2017-05-09 | End: 2017-05-12

## 2017-05-09 RX ORDER — HEPARIN SODIUM 5000 [USP'U]/ML
5000 INJECTION INTRAVENOUS; SUBCUTANEOUS EVERY 12 HOURS
Qty: 0 | Refills: 0 | Status: DISCONTINUED | OUTPATIENT
Start: 2017-05-09 | End: 2017-05-09

## 2017-05-09 RX ORDER — AMLODIPINE BESYLATE 2.5 MG/1
10 TABLET ORAL DAILY
Qty: 0 | Refills: 0 | Status: DISCONTINUED | OUTPATIENT
Start: 2017-05-09 | End: 2017-05-12

## 2017-05-09 RX ORDER — MORPHINE SULFATE 50 MG/1
2 CAPSULE, EXTENDED RELEASE ORAL ONCE
Qty: 0 | Refills: 0 | Status: DISCONTINUED | OUTPATIENT
Start: 2017-05-09 | End: 2017-05-09

## 2017-05-09 RX ORDER — MORPHINE SULFATE 50 MG/1
2 CAPSULE, EXTENDED RELEASE ORAL EVERY 6 HOURS
Qty: 0 | Refills: 0 | Status: DISCONTINUED | OUTPATIENT
Start: 2017-05-09 | End: 2017-05-11

## 2017-05-09 RX ORDER — BUDESONIDE AND FORMOTEROL FUMARATE DIHYDRATE 160; 4.5 UG/1; UG/1
2 AEROSOL RESPIRATORY (INHALATION)
Qty: 0 | Refills: 0 | Status: DISCONTINUED | OUTPATIENT
Start: 2017-05-09 | End: 2017-05-12

## 2017-05-09 RX ORDER — IPRATROPIUM/ALBUTEROL SULFATE 18-103MCG
3 AEROSOL WITH ADAPTER (GRAM) INHALATION EVERY 6 HOURS
Qty: 0 | Refills: 0 | Status: DISCONTINUED | OUTPATIENT
Start: 2017-05-09 | End: 2017-05-10

## 2017-05-09 RX ORDER — PANTOPRAZOLE SODIUM 20 MG/1
40 TABLET, DELAYED RELEASE ORAL
Qty: 0 | Refills: 0 | Status: DISCONTINUED | OUTPATIENT
Start: 2017-05-09 | End: 2017-05-12

## 2017-05-09 RX ORDER — GABAPENTIN 400 MG/1
300 CAPSULE ORAL THREE TIMES A DAY
Qty: 0 | Refills: 0 | Status: DISCONTINUED | OUTPATIENT
Start: 2017-05-09 | End: 2017-05-12

## 2017-05-09 RX ORDER — ATORVASTATIN CALCIUM 80 MG/1
20 TABLET, FILM COATED ORAL AT BEDTIME
Qty: 0 | Refills: 0 | Status: DISCONTINUED | OUTPATIENT
Start: 2017-05-09 | End: 2017-05-12

## 2017-05-09 RX ORDER — CHOLECALCIFEROL (VITAMIN D3) 125 MCG
1000 CAPSULE ORAL DAILY
Qty: 0 | Refills: 0 | Status: DISCONTINUED | OUTPATIENT
Start: 2017-05-09 | End: 2017-05-12

## 2017-05-09 RX ORDER — IPRATROPIUM/ALBUTEROL SULFATE 18-103MCG
3 AEROSOL WITH ADAPTER (GRAM) INHALATION ONCE
Qty: 0 | Refills: 0 | Status: COMPLETED | OUTPATIENT
Start: 2017-05-09 | End: 2017-05-09

## 2017-05-09 RX ORDER — ACETAMINOPHEN 500 MG
650 TABLET ORAL EVERY 6 HOURS
Qty: 0 | Refills: 0 | Status: DISCONTINUED | OUTPATIENT
Start: 2017-05-09 | End: 2017-05-12

## 2017-05-09 RX ADMIN — Medication 3 MILLILITER(S): at 23:39

## 2017-05-09 RX ADMIN — Medication 1 TABLET(S): at 19:20

## 2017-05-09 RX ADMIN — MORPHINE SULFATE 2 MILLIGRAM(S): 50 CAPSULE, EXTENDED RELEASE ORAL at 21:45

## 2017-05-09 RX ADMIN — ATORVASTATIN CALCIUM 20 MILLIGRAM(S): 80 TABLET, FILM COATED ORAL at 21:32

## 2017-05-09 RX ADMIN — MORPHINE SULFATE 2 MILLIGRAM(S): 50 CAPSULE, EXTENDED RELEASE ORAL at 17:21

## 2017-05-09 RX ADMIN — GABAPENTIN 300 MILLIGRAM(S): 400 CAPSULE ORAL at 21:32

## 2017-05-09 RX ADMIN — MORPHINE SULFATE 2 MILLIGRAM(S): 50 CAPSULE, EXTENDED RELEASE ORAL at 21:33

## 2017-05-09 RX ADMIN — BUDESONIDE AND FORMOTEROL FUMARATE DIHYDRATE 2 PUFF(S): 160; 4.5 AEROSOL RESPIRATORY (INHALATION) at 19:53

## 2017-05-09 RX ADMIN — EXEMESTANE 25 MILLIGRAM(S): 25 TABLET, SUGAR COATED ORAL at 21:38

## 2017-05-09 RX ADMIN — Medication 3 MILLILITER(S): at 19:20

## 2017-05-09 RX ADMIN — Medication 3 MILLILITER(S): at 17:21

## 2017-05-09 NOTE — ED PROVIDER NOTE - PROGRESS NOTE DETAILS
pain and bp improved.  spoke with dr. peck np rec admit to medicine spoke with dr. gray rec admit to hospitalist.

## 2017-05-09 NOTE — CONSULT NOTE ADULT - SUBJECTIVE AND OBJECTIVE BOX
62F PMHx of COPD, asthma, RUL lobectomy in december 2016 for a lung nodule, and breast cancer in 2014, presented to the ED for shortness of breath. . On Friday,states that she had a mechanical fall and hit her left chest on a piece of furniture. She denies head trauma or LOC. She denies dizziness or visual changes prior to fall.  She reports pleuritic chest pain on the left side, as well as difficulty inhaling deep, and some cough(no sputum). She denies any fever, nausea, vomiting, palpitations, nausea, vomiting, diarrhea, dizziness or history of near syncoope. Chest Xray in ED revealed left sided rib fracture without evidence of pneumothorax. Patient admitted to medicine for observation and pain control.       PAST MEDICAL & SURGICAL HISTORY:  Tongue lesion  COPD (chronic obstructive pulmonary disease)  Lung nodule: right  Asthma  High cholesterol  Hypertension  Breast cancer: right  History of thoracic surgery: R VATS RA RUL wedge resection with LN bx; nov 2016; &amp; Dec 2016  History of surgery: tongue surgery sept 2016  Elective surgery: lymph node excision april 28 2014  H/O right knee surgery  History of lumpectomy of right breast      MEDICATIONS  (STANDING):    MEDICATIONS  (PRN):  acetaminophen   Tablet. 650milliGRAM(s) Oral every 6 hours PRN Moderate Pain (4 - 6)      Allergies    No Known Allergies    Intolerances        SOCIAL HISTORY:  Smoker:   NO          ETOH use:  NO     Assisted device use (Cane / Walker):no   Live with: alone    FAMILY HISTORY:  Family history of lung cancer (Father)  Family history of breast cancer (Mother)  No pertinent family history in first degree relatives      Review of Systems  CONSTITUTIONAL:   NEGATIVE  NEURO:    NEGATIVE  EYES:  NEGATIVE  ENMT: NEGATIVE  CV:  NEGATIVE  RESPIRATORY:  + Wheezing, +pleuritic chest pain. + SOB, negative for cough / sputum, hemoptysis                                                                GI:  NEGATIVE  : NEGATIVE  MUSKULOSKELETAL:  aNEGATIVE  SKIN/BREAST:NEGATIVE  PSYCH: NEGATIVE  HEME/LYMPH:  NEGATIVE  ENDOCRINE: NEGATIVE    PHYSICAL EXAM  Vital Signs Last 24 Hrs  T(C): 36.6, Max: 36.6 (05-09 @ 15:21)  T(F): 97.9, Max: 97.9 (05-09 @ 17:18)  HR: 78 (78 - 78)  BP: 127/72 (127/72 - 193/83)  BP(mean): --  RR: 20 (18 - 20)  SpO2: 98% (88% - 98%)    CONSTITUTIONAL: Aox3 in distress due to pain     NEURO: no focal neuro deficit.    EYES:EOMS intact PERRLA  ENMT: mucus membrane moist. no pharyngeal erythema or exudates.   CV: RRR no murmurs rubs or gallops   RESPIRATORY: + bilateral wheeze. Left sided chest wall tenderness with splintinig. No rales or rhonchi  GI: normal bowel sounds. non tender non distended.   : DUNN  -       MUSKULOSKELETAL: wwp no peripheral edema.     LABS:                        14.4   8.3   )-----------( 264      ( 09 May 2017 16:09 )             39.8     05-09    139  |  103  |  7   ----------------------------<  140<H>  4.1   |  30  |  0.54    Ca    9.1      09 May 2017 16:09    TPro  7.6  /  Alb  3.1<L>  /  TBili  1.1  /  DBili  x   /  AST  36  /  ALT  26  /  AlkPhos  116  05-09                RADIOLOGY & ADDITIONAL STUDIES:

## 2017-05-09 NOTE — H&P ADULT - HISTORY OF PRESENT ILLNESS
62F PMHx of COPD, asthma, RUL lobectomy in december 2016 for a lung nodule, and breast cancer in 2014, presented to the ED for shortness of breath. She was sent in by Dr Foreman's NP. On Friday, she reports that she was bending over to put more water bottles in her refrigerator when she fell over on to a piece of furniture on her left chest. She did not hit her head, feel dizzy, or lose consciousness. 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 She reports pleuritic chest pain on the left side, as well as difficulty inhaling deep, and some cough(no sputum). She denies any fever, nausea, vomiting, palpitations, nausea, vomiting, diarrhea, or constipation at the current time.  Upon arrival to the ED, vitals were: T97.8, /83, HR 78, R18, SpO2 88%. She was placed on 3L nasal cannula. CXR showed multiple left sided rib fractures. 62F PMHx of COPD, asthma, RUL lobectomy in december 2016 for a lung nodule, and breast cancer in 2014, presented to the ED for shortness of breath. She was sent in by Dr Foreman's NP. On Friday, she reports that she was bending over to put more water bottles in her refrigerator when she fell over from a half standing level on to a piece of furniture on her left chest. She did not hit her head, feel dizzy, or lose consciousness. 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 She reports pleuritic chest pain on the left side, as well as difficulty inhaling deep, and some cough(no sputum). She denies any fever, nausea, vomiting, palpitations, nausea, vomiting, diarrhea, or constipation at the current time.  Upon arrival to the ED, vitals were: T97.8, /83, HR 78, R18, SpO2 88%. She was placed on 3L nasal cannula. CXR showed multiple left sided rib fractures. 62F PMHx of COPD, asthma, RUL lobectomy in december 2016 for a lung nodule, and breast cancer in 2014, presented to the ED for shortness of breath. She was sent in by Dr Foreman's NP. On Friday, she reports that she was bending over to put more water bottles in her refrigerator when she fell over from a half standing level on to a piece of furniture on her left chest. She did not hit her head, feel dizzy, or lose consciousness. 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 She reports pleuritic chest pain on the left side, as well as difficulty inhaling deep, and some cough(no sputum). She denies any fever, nausea, vomiting, palpitations, nausea, vomiting, diarrhea, or constipation at the current time.  Upon arrival to the ED, vitals were: T97.8, /83, HR 78, R18, SpO2 88%. Ribs XR showed multiple left sided fractures. She was placed on 3L nasal cannula, and given 2mg Morphine. 62F PMHx of COPD, asthma, RUL lobectomy in December 2016 for a lung nodule, and breast cancer in 2014, presented to the ED for shortness of breath. She was sent in by Dr Foreman's NP. On Friday, she reports that she was bending over to put more water bottles in her refrigerator when she fell over from a half standing level on to a piece of furniture on her left chest. She did not hit her head, feel dizzy, or lose consciousness. 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 She reports pleuritic chest pain on the left side, as well as difficulty inhaling deep, and some cough(no sputum). She denies any fever, nausea, vomiting, palpitations, nausea, vomiting, diarrhea, or constipation at the current time.  Upon arrival to the ED, vitals were: T97.8, /83, HR 78, R18, SpO2 88%. Ribs XR showed multiple left sided fractures. She was placed on 3L nasal cannula, and given 2mg Morphine.

## 2017-05-09 NOTE — ED PROVIDER NOTE - OBJECTIVE STATEMENT
63 yo F with hx of breast CA s/p lumpectomy remote past, asthma and COPD not on home O2, former smoker, HTN and recent RUL resection in Dec 16 presenting with 3 days of worsening pleuritic L sided rib pain where patient injured during mechanical trip and fall from standing height 3 days ago.  Denies other injury or head trauma.  Reports increased SOTO since injury to ribs.  Was seen by Dr. Foreman's NP prior to ED arrival and had Xray ribs and lung and was transferred for hypoxia in mid 80s.  Pt reports has a pulse ox at home and usually is 97% on RA.  Denies fever, chills, cough, LE edema or calf pain.

## 2017-05-09 NOTE — H&P ADULT - PROBLEM SELECTOR PLAN 4
H/o breast cancer with lumpectomy, lymph node biosy, chemo and radiation in 2014. Patient has been on Exemastane since and will need to continue it through 2019.  - Continue home Exemastane

## 2017-05-09 NOTE — H&P ADULT - ASSESSMENT
62F PMHx of COPD, asthma, RUL lobectomy in december 2016 for a lung nodule, and breast cancer in 2014, admitted for hypoxia in the setting of rib fracture.

## 2017-05-09 NOTE — ED ADULT TRIAGE NOTE - CHIEF COMPLAINT QUOTE
fell and hurt left ribs- associated SOB; brought in by MD- placed on 2 LPM nasal cannula and sat increased to 94%; worsening SOTO

## 2017-05-09 NOTE — ED PROVIDER NOTE - RESPIRATORY, MLM
Mild tachypnea, speaking full sentences, labored breathing with exertion, improved with nasal cannula O2, mid 80s sat on room air.  Painful breathing to L ribs.  TTP L ribs.  No crepitus.

## 2017-05-09 NOTE — ED PROVIDER NOTE - CARE PLAN
Principal Discharge DX:	Closed fracture of multiple ribs of left side, initial encounter  Secondary Diagnosis:	Hypoxia  Secondary Diagnosis:	Chronic obstructive pulmonary disease, unspecified COPD type

## 2017-05-09 NOTE — H&P ADULT - NSHPLABSRESULTS_GEN_ALL_CORE
.  LABS:                         14.4   8.3   )-----------( 264      ( 09 May 2017 16:09 )             39.8     05-09    139  |  103  |  7   ----------------------------<  140<H>  4.1   |  30  |  0.54    Ca    9.1      09 May 2017 16:09    TPro  7.6  /  Alb  3.1<L>  /  TBili  1.1  /  DBili  x   /  AST  36  /  ALT  26  /  AlkPhos  116  05-09                  RADIOLOGY, EKG & ADDITIONAL TESTS:     EXAM:  XR RIBS-BILATERAL-3 VIEWS                          PROCEDURE DATE:  05/09/2017                     INTERPRETATION:  History: pain    Technique: AP and oblique views of the right and left ribs    Prior study: 4/21/2017    Findings: There is no acute displaced rib fracture. There is no evidence   for pneumothorax, pleural effusion or other ancillary finding to suggest   rib fracture. The partially visualized lungs are clear. The   cardiomediastinal silhouette is unremarkable.    Impression: No acute displaced rib fracture            "Thank you for the opportunity to participate in the care of this   patient."        KAREN NEWELL M.D., ATTENDING RADIOLOGIST  This document has been electronically signed. May  9 2017  3:08PM

## 2017-05-09 NOTE — H&P ADULT - PROBLEM SELECTOR PLAN 8
Recent tongue surgery for a lesion.   - Obtain collateral T1N0M0 SCCA right oral tongue s/p surgical resection 2/2/17.

## 2017-05-09 NOTE — ED PROVIDER NOTE - MEDICAL DECISION MAKING DETAILS
Pt with fall and increasing pleuritic rib pain and SOTO.  Noted on xray is multiple L sided rib fx (radiologist read negative).  Pt with mid 80s O2 sat requiring oxygen.  Given hx of asthma, copd, lung resection in addition to having rib fx will need admission for O2, incentive hugo, pain control and admit.  No ev of pna, ptx, hemothorax.

## 2017-05-09 NOTE — ED ADULT NURSE NOTE - OBJECTIVE STATEMENT
pt arrives SOB , shallow respirations c/o pain on left lateral lower chest since fall on friday/ ghood relief form SOB noted with aplication of O2 via nasal canula at 4l

## 2017-05-09 NOTE — H&P ADULT - ATTENDING COMMENTS
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 Since the incident, she has had worsening pain at the area, limiting her inspiration. She felt more SOB today, so decided to come in, so went to Dr. Foreman's office where she had CXR read as L sided rib fxs. +chronic, unchanged cough. +mild b/l LE edema, worsened the last few days.  BP 190s/90s initially, hypoxic to 88%RA in ED, AFVSS currently on NC  Mild distress 2/2 pain, but otherwise AAOx3, speaking in full sentences, not using any accessory muscles, breaths short 2/2 pain  RRR, no murmur  Lungs CTA with poor inspiratory effort (reported to me as mild wheezing, but pt had just used a nebulizer and not present on my exam)  +severe tenderness to light tough over areas of lateral 9th-12th ribs, no obvious bruising/ecchymosis; possible step off present but difficult to determine as pt did not tolerate exam well  Abd benign  LEs with trace-1+ pitting edema to ankles b/l, distal pulses in tact, no clubbing  Labs and imaging reviewed  A/P: Dyspnea/hypoxic respiratory failure--Pt admitted with diagnosis of L sided nondisplaced rib fractures, although official read of Xray was no fracture. Images reviewed by ED attending and CT surgery, who think that rib fractures are present. No obvious fractures on my read, although possible that film did not catch fractures of lower ribs. At this time, my suspicion for the etiology of her SOB and hypoxia is splinting from pain, whether fractures are present or not, and I do not feel that additional imaging is warranted at this time, as the presence or absence of fractures would not significantly alter the treatment plan, which is for adequate pain control and incentive spirometry. If fractures are present, they are nondisplaced and would not require surgical intervention. The wheezing noted on PGY-1 exam was not present during my exam, so I doubt there is a component of asthma/COPD exacerbation to this. Will continue nebs PRN, but do not feel steroids warranted at this time. There is very low concern for PE, as the patient was not tachycardic and other explanations are more plausible. Given mild LE edema, will check LE dopplers to r/o DVT. If DVT present, pt may benefit from CT PE protocol, but will hold off currently. CXR otherwise clear.  2) ?Rib fractures--CT surgery following. Xray read as no fractures, but pt in significant pain. Will continue pain control and incentive spirometry.  3) Fall--Mechanical in nature.  4) Hypertensive urgency--Pt initially met criteria for HTN urgency on presentation, but this resolved with pain control, so is likely 2/2 pain. Will restart home BP meds and monitor BP closely.  5) B/L LE edema--Mild. Will check LE dopplers as above.  6) COPD/Asthma--Pt does not appear to be in acute exacerbation. restart home Symbicort and continue PRN nebs.  7) Hx of Breast cancer--Continue Aromasin.  8) Hx of Lung cancer--Pt reports successful resection (after 2nd attempt) with no chemo or XRT necessary. Outpt follow-up as needed.  9) Hx of Tongue cancer--Pt underwent successful resection (also after 2nd attempt). Outpt follow-up as needed.

## 2017-05-09 NOTE — CONSULT NOTE ADULT - ASSESSMENT
62F PMHx of COPD, asthma, RUL lobectomy in december 2016 for a lung nodule, and breast cancer in 2014, presented to the ED for shortness of breath. Found to have left sided non displaced rib fracture on chest xray without evidence of pneumothorax.   - pain control per primary team  - Repeat chest Xray in the AM.  - no intervention at this time.   - Will continue to follow.     Massiel Russo PA-C

## 2017-05-09 NOTE — ED ADULT NURSE NOTE - PMH
Asthma    Breast cancer  right  COPD (chronic obstructive pulmonary disease)    High cholesterol    Hypertension    Lung nodule  right  Tongue lesion

## 2017-05-09 NOTE — H&P ADULT - NSHPPHYSICALEXAM_GEN_ALL_CORE
.  VITAL SIGNS:  T(C): 36.6, Max: 36.6 (05-09 @ 15:21)  T(F): 97.9, Max: 97.9 (05-09 @ 17:18)  HR: 78 (78 - 78)  BP: 127/72 (127/72 - 193/83)  BP(mean): --  RR: 20 (18 - 20)  SpO2: 98% (88% - 98%)  Wt(kg): --    PHYSICAL EXAM:    Constitutional: WDWN resting comfortably in bed; NAD  Head: NC/AT  Eyes: PERRL  ENT: tongue asymmetrical - superior on the left, moist mucus membranes, Mallampati 2    Neck: supple; no JVD or thyromegaly  Respiratory: scant wheezes throughout   Cardiac: +S1/S2; RRR; no M/R/G  Gastrointestinal: soft, NT/ND; no rebound or guarding; +BSx4  Back: spine midline, no bony tenderness or step-offs  Extremities: WWP, no clubbing or cyanosis; trace pitting edema at the ankles  Musculoskeletal: NROM x4; no joint swelling, tenderness or erythema  Vascular: 2+ DP pulses B/L  Dermatologic: skin warm, dry and intact  Lymphatic: no submandibular or cervical LAD  Neurologic: AAOx3; CNII-XII grossly intact;  sensory deficit in the right great toe  Psychiatric: affect and characteristics of appearance, verbalizations, behaviors are appropriate

## 2017-05-09 NOTE — H&P ADULT - PROBLEM SELECTOR PLAN 2
Liekly 2/2 splinting from pain of broken ribs. Do not feel that patient is having an asthma or COPD exacerbation at the current time, as there has been no increase in her sputum production or cough, though she does have minimal wheezing on exam.  - Continue Home Advair  - Start Duonebs q6 PRN  - Continue NC   - Obtain CXR to ensure no infiltrate

## 2017-05-09 NOTE — H&P ADULT - PROBLEM SELECTOR PLAN 10
No IVF  Replete electrolytes for a K of 4 and Mg of 2  DASH/TLC diet   Dispo: Admit to F for pain control DVT ppx: Heparin SubQ  Replete electrolytes for a K of 4 and Mg of 2  DASH/TLC diet   Dispo: Admit to F for pain control DVT ppx: SCDs  Replete electrolytes for a K of 4 and Mg of 2  DASH/TLC diet   Dispo: Admit to F for pain control

## 2017-05-09 NOTE — H&P ADULT - NSHPSOCIALHISTORY_GEN_ALL_CORE
Drinks up to 3 alcoholic drinks about 5 days a week.   Former smoker, completely quit 12/2016  No recreational drug use  Lives alone, with neighbors and son as her social support

## 2017-05-09 NOTE — H&P ADULT - PROBLEM SELECTOR PLAN 1
S/p fall onto furniture resulting on fracture of multiple left sided rib fractures. Patient hypoxic and with likely atelectasis from splinting 2/2 pain.   - Continue with NC to maintain on O2 sat >92  - Pain control with Tramadol 50mg q8 and Morphine for break through pain S/p fall onto furniture resulting on fracture of multiple left sided rib fractures. Patient hypoxic and with likely atelectasis from splinting 2/2 pain.   - Continue with NC to maintain on O2 sat >92  - Pain control with Tramadol 50mg q8 and Morphine for break through pain  - Incentive spirometry

## 2017-05-10 ENCOUNTER — TRANSCRIPTION ENCOUNTER (OUTPATIENT)
Age: 62
End: 2017-05-10

## 2017-05-10 LAB
ANION GAP SERPL CALC-SCNC: 8 MMOL/L — LOW (ref 9–16)
BUN SERPL-MCNC: 7 MG/DL — SIGNIFICANT CHANGE UP (ref 7–23)
CALCIUM SERPL-MCNC: 8.6 MG/DL — SIGNIFICANT CHANGE UP (ref 8.5–10.5)
CHLORIDE SERPL-SCNC: 102 MMOL/L — SIGNIFICANT CHANGE UP (ref 96–108)
CO2 SERPL-SCNC: 29 MMOL/L — SIGNIFICANT CHANGE UP (ref 22–31)
CREAT SERPL-MCNC: 0.58 MG/DL — SIGNIFICANT CHANGE UP (ref 0.5–1.3)
GLUCOSE SERPL-MCNC: 99 MG/DL — SIGNIFICANT CHANGE UP (ref 70–99)
HCT VFR BLD CALC: 37.7 % — SIGNIFICANT CHANGE UP (ref 34.5–45)
HCV AB S/CO SERPL IA: 0.14 S/CO — SIGNIFICANT CHANGE UP
HCV AB SERPL-IMP: SIGNIFICANT CHANGE UP
HGB BLD-MCNC: 13.2 G/DL — SIGNIFICANT CHANGE UP (ref 11.5–15.5)
HIV 1+2 AB+HIV1 P24 AG SERPL QL IA: SIGNIFICANT CHANGE UP
MAGNESIUM SERPL-MCNC: 1.6 MG/DL — SIGNIFICANT CHANGE UP (ref 1.6–2.4)
MCHC RBC-ENTMCNC: 35 G/DL — SIGNIFICANT CHANGE UP (ref 32–36)
MCHC RBC-ENTMCNC: 36.3 PG — HIGH (ref 27–34)
MCV RBC AUTO: 103.6 FL — HIGH (ref 80–100)
PLATELET # BLD AUTO: 243 K/UL — SIGNIFICANT CHANGE UP (ref 150–400)
POTASSIUM SERPL-MCNC: 3.4 MMOL/L — LOW (ref 3.5–5.3)
POTASSIUM SERPL-SCNC: 3.4 MMOL/L — LOW (ref 3.5–5.3)
RBC # BLD: 3.64 M/UL — LOW (ref 3.8–5.2)
RBC # FLD: 14.3 % — SIGNIFICANT CHANGE UP (ref 10.3–16.9)
SODIUM SERPL-SCNC: 139 MMOL/L — SIGNIFICANT CHANGE UP (ref 135–145)
VIT B12 SERPL-MCNC: 431 PG/ML — SIGNIFICANT CHANGE UP (ref 243–894)
WBC # BLD: 7 K/UL — SIGNIFICANT CHANGE UP (ref 3.8–10.5)
WBC # FLD AUTO: 7 K/UL — SIGNIFICANT CHANGE UP (ref 3.8–10.5)

## 2017-05-10 PROCEDURE — 99233 SBSQ HOSP IP/OBS HIGH 50: CPT | Mod: GC

## 2017-05-10 PROCEDURE — 71010: CPT | Mod: 26

## 2017-05-10 PROCEDURE — 71260 CT THORAX DX C+: CPT | Mod: 26

## 2017-05-10 RX ORDER — FOLIC ACID 0.8 MG
1 TABLET ORAL DAILY
Qty: 0 | Refills: 0 | Status: DISCONTINUED | OUTPATIENT
Start: 2017-05-10 | End: 2017-05-12

## 2017-05-10 RX ORDER — IPRATROPIUM/ALBUTEROL SULFATE 18-103MCG
3 AEROSOL WITH ADAPTER (GRAM) INHALATION EVERY 6 HOURS
Qty: 0 | Refills: 0 | Status: DISCONTINUED | OUTPATIENT
Start: 2017-05-10 | End: 2017-05-10

## 2017-05-10 RX ORDER — ACETAMINOPHEN 500 MG
2 TABLET ORAL
Qty: 0 | Refills: 0 | COMMUNITY
Start: 2017-05-10

## 2017-05-10 RX ORDER — TRAMADOL HYDROCHLORIDE 50 MG/1
50 TABLET ORAL EVERY 8 HOURS
Qty: 0 | Refills: 0 | Status: DISCONTINUED | OUTPATIENT
Start: 2017-05-10 | End: 2017-05-11

## 2017-05-10 RX ORDER — THIAMINE MONONITRATE (VIT B1) 100 MG
1 TABLET ORAL
Qty: 0 | Refills: 0 | COMMUNITY
Start: 2017-05-10

## 2017-05-10 RX ORDER — IPRATROPIUM/ALBUTEROL SULFATE 18-103MCG
3 AEROSOL WITH ADAPTER (GRAM) INHALATION EVERY 4 HOURS
Qty: 0 | Refills: 0 | Status: DISCONTINUED | OUTPATIENT
Start: 2017-05-10 | End: 2017-05-12

## 2017-05-10 RX ORDER — CALCIUM CARBONATE 500(1250)
1 TABLET ORAL DAILY
Qty: 0 | Refills: 0 | Status: DISCONTINUED | OUTPATIENT
Start: 2017-05-10 | End: 2017-05-12

## 2017-05-10 RX ORDER — FOLIC ACID 0.8 MG
1 TABLET ORAL
Qty: 0 | Refills: 0 | COMMUNITY
Start: 2017-05-10

## 2017-05-10 RX ORDER — MORPHINE SULFATE 50 MG/1
1 CAPSULE, EXTENDED RELEASE ORAL ONCE
Qty: 0 | Refills: 0 | Status: DISCONTINUED | OUTPATIENT
Start: 2017-05-10 | End: 2017-05-10

## 2017-05-10 RX ORDER — THIAMINE MONONITRATE (VIT B1) 100 MG
100 TABLET ORAL DAILY
Qty: 0 | Refills: 0 | Status: DISCONTINUED | OUTPATIENT
Start: 2017-05-10 | End: 2017-05-12

## 2017-05-10 RX ORDER — POTASSIUM CHLORIDE 20 MEQ
40 PACKET (EA) ORAL ONCE
Qty: 0 | Refills: 0 | Status: COMPLETED | OUTPATIENT
Start: 2017-05-10 | End: 2017-05-10

## 2017-05-10 RX ORDER — MAGNESIUM SULFATE 500 MG/ML
2 VIAL (ML) INJECTION ONCE
Qty: 0 | Refills: 0 | Status: COMPLETED | OUTPATIENT
Start: 2017-05-10 | End: 2017-05-10

## 2017-05-10 RX ADMIN — Medication 50 GRAM(S): at 08:59

## 2017-05-10 RX ADMIN — MORPHINE SULFATE 2 MILLIGRAM(S): 50 CAPSULE, EXTENDED RELEASE ORAL at 19:46

## 2017-05-10 RX ADMIN — Medication 3 MILLILITER(S): at 11:50

## 2017-05-10 RX ADMIN — Medication 20 MILLIGRAM(S): at 13:25

## 2017-05-10 RX ADMIN — MORPHINE SULFATE 1 MILLIGRAM(S): 50 CAPSULE, EXTENDED RELEASE ORAL at 02:30

## 2017-05-10 RX ADMIN — Medication 1 MILLIGRAM(S): at 11:50

## 2017-05-10 RX ADMIN — Medication 1 TABLET(S): at 17:15

## 2017-05-10 RX ADMIN — Medication 3 MILLILITER(S): at 06:29

## 2017-05-10 RX ADMIN — TRAMADOL HYDROCHLORIDE 50 MILLIGRAM(S): 50 TABLET ORAL at 22:19

## 2017-05-10 RX ADMIN — Medication 3 MILLILITER(S): at 17:15

## 2017-05-10 RX ADMIN — Medication 3 MILLILITER(S): at 21:38

## 2017-05-10 RX ADMIN — AMLODIPINE BESYLATE 10 MILLIGRAM(S): 2.5 TABLET ORAL at 06:28

## 2017-05-10 RX ADMIN — TRAMADOL HYDROCHLORIDE 50 MILLIGRAM(S): 50 TABLET ORAL at 13:26

## 2017-05-10 RX ADMIN — GABAPENTIN 300 MILLIGRAM(S): 400 CAPSULE ORAL at 13:25

## 2017-05-10 RX ADMIN — Medication 1 TABLET(S): at 11:51

## 2017-05-10 RX ADMIN — TRAMADOL HYDROCHLORIDE 50 MILLIGRAM(S): 50 TABLET ORAL at 23:19

## 2017-05-10 RX ADMIN — Medication 20 MILLIGRAM(S): at 16:12

## 2017-05-10 RX ADMIN — BUDESONIDE AND FORMOTEROL FUMARATE DIHYDRATE 2 PUFF(S): 160; 4.5 AEROSOL RESPIRATORY (INHALATION) at 06:29

## 2017-05-10 RX ADMIN — MORPHINE SULFATE 1 MILLIGRAM(S): 50 CAPSULE, EXTENDED RELEASE ORAL at 02:11

## 2017-05-10 RX ADMIN — PANTOPRAZOLE SODIUM 40 MILLIGRAM(S): 20 TABLET, DELAYED RELEASE ORAL at 06:28

## 2017-05-10 RX ADMIN — TRAMADOL HYDROCHLORIDE 50 MILLIGRAM(S): 50 TABLET ORAL at 14:26

## 2017-05-10 RX ADMIN — EXEMESTANE 25 MILLIGRAM(S): 25 TABLET, SUGAR COATED ORAL at 17:14

## 2017-05-10 RX ADMIN — MORPHINE SULFATE 2 MILLIGRAM(S): 50 CAPSULE, EXTENDED RELEASE ORAL at 10:45

## 2017-05-10 RX ADMIN — GABAPENTIN 300 MILLIGRAM(S): 400 CAPSULE ORAL at 06:28

## 2017-05-10 RX ADMIN — Medication 1000 UNIT(S): at 11:50

## 2017-05-10 RX ADMIN — BUDESONIDE AND FORMOTEROL FUMARATE DIHYDRATE 2 PUFF(S): 160; 4.5 AEROSOL RESPIRATORY (INHALATION) at 17:16

## 2017-05-10 RX ADMIN — Medication 40 MILLIEQUIVALENT(S): at 08:59

## 2017-05-10 RX ADMIN — Medication 100 MILLIGRAM(S): at 11:50

## 2017-05-10 RX ADMIN — Medication 100 MILLIGRAM(S): at 06:29

## 2017-05-10 RX ADMIN — MORPHINE SULFATE 2 MILLIGRAM(S): 50 CAPSULE, EXTENDED RELEASE ORAL at 10:30

## 2017-05-10 RX ADMIN — GABAPENTIN 300 MILLIGRAM(S): 400 CAPSULE ORAL at 21:38

## 2017-05-10 RX ADMIN — MORPHINE SULFATE 2 MILLIGRAM(S): 50 CAPSULE, EXTENDED RELEASE ORAL at 19:31

## 2017-05-10 RX ADMIN — ATORVASTATIN CALCIUM 20 MILLIGRAM(S): 80 TABLET, FILM COATED ORAL at 21:38

## 2017-05-10 RX ADMIN — Medication 5 MILLIGRAM(S): at 21:38

## 2017-05-10 NOTE — DISCHARGE NOTE ADULT - ADDITIONAL INSTRUCTIONS
Please make sure to follow up with Dr Foreman within 1 week.   Please make sure to follow up with Dr Landry within 2 weeks. Please make sure to follow up with Dr Foreman within 1 week.   Please make sure to follow up with Dr Landry within 1 week.  Please make sure to follow up with Dr Pierce within 2 weeks.

## 2017-05-10 NOTE — DISCHARGE NOTE ADULT - CARE PLAN
Principal Discharge DX:	Closed fracture of multiple ribs of left side, initial encounter  Goal:	Pain control  Instructions for follow-up, activity and diet:	The best treatment for rib fractures is pain control so that you can comfortably  Secondary Diagnosis:	Chronic obstructive pulmonary disease, unspecified COPD type  Secondary Diagnosis:	Breast cancer  Secondary Diagnosis:	Hypoxia  Secondary Diagnosis:	Neuropathy  Secondary Diagnosis:	Essential hypertension Principal Discharge DX:	Closed fracture of multiple ribs of left side, initial encounter  Goal:	Pain control  Instructions for follow-up, activity and diet:	The best treatment for rib fractures is pain control so that you can comfortably inhale deeply, allowing your lung to expand. For your pain, we have prescribed Tramadol, which can be taken 3 times a day.  Secondary Diagnosis:	Chronic obstructive pulmonary disease, unspecified COPD type  Instructions for follow-up, activity and diet:	Please continue to take you Advair twice a day everyday and your Proair as needed.  Secondary Diagnosis:	Breast cancer  Instructions for follow-up, activity and diet:	Please continue to take your Exemastane everyday.  Secondary Diagnosis:	Hypoxia  Instructions for follow-up, activity and diet:	This means low oxygen level, and was caused by the fractures in your rib. To treat this, we are giving you pain medications so that you can fully expand your lung and get enough oxygen in to your body.  Secondary Diagnosis:	Neuropathy  Instructions for follow-up, activity and diet:	Please continue to take your gabapentin three times a day.  Secondary Diagnosis:	Essential hypertension  Instructions for follow-up, activity and diet:	Please continue to take your Amlodipine and Toprol everyday. Principal Discharge DX:	Closed fracture of multiple ribs of left side, initial encounter  Goal:	Pain control  Instructions for follow-up, activity and diet:	The best treatment for rib fractures is pain control so that you can comfortably inhale deeply, allowing your lung to expand. For your pain, we have prescribed Tramadol, which can be taken 3 times a day.  Secondary Diagnosis:	Chronic obstructive pulmonary disease, unspecified COPD type  Instructions for follow-up, activity and diet:	Please continue to take you Advair twice a day everyday and your Pro-Air as needed. You have also been started on Spiriva, which is a once a day medication for COPD.   While you were here at [facility], you also had a very mild COPD exacerbation, so you were started on a 5 day prednisone course. Please take 2 prednisone pills once a day on both Saturday and Sunday.   Please be sure to follow up with Dr Pierce within the next 2 weeks.  Secondary Diagnosis:	Breast cancer  Instructions for follow-up, activity and diet:	Please continue to take your Exemastane everyday.  Secondary Diagnosis:	Hypoxia  Instructions for follow-up, activity and diet:	This means low oxygen level, and was caused by the fractures in your rib. To treat this, we are giving you pain medications so that you can fully expand your lung and get enough oxygen in to your body. It will also be necessary for you to use oxygen while you are walking until your lungs and ribs are further healed.  Secondary Diagnosis:	Neuropathy  Instructions for follow-up, activity and diet:	Please continue to take your gabapentin three times a day.  Secondary Diagnosis:	Essential hypertension  Instructions for follow-up, activity and diet:	Please continue to take your Amlodipine and Toprol everyday. Principal Discharge DX:	Closed fracture of multiple ribs of left side, initial encounter  Goal:	Pain control  Instructions for follow-up, activity and diet:	The best treatment for rib fractures is pain control so that you can comfortably inhale deeply, allowing your lung to expand. For your pain, we have prescribed Percocet, which can be taken up to 4 times a day.  Secondary Diagnosis:	Chronic obstructive pulmonary disease, unspecified COPD type  Instructions for follow-up, activity and diet:	Please continue to take you Advair twice a day everyday and your Pro-Air as needed. You have also been started on Spiriva, which is a once a day medication for COPD.   While you were here at [facility], you also had a very mild COPD exacerbation, so you were started on a 5 day prednisone course. Please take 2 prednisone pills once a day on both Saturday and Sunday.   Please be sure to follow up with Dr Pierce within the next 2 weeks.  Secondary Diagnosis:	Breast cancer  Instructions for follow-up, activity and diet:	Please continue to take your Exemastane everyday.  Secondary Diagnosis:	Hypoxia  Instructions for follow-up, activity and diet:	This means low oxygen level, and was caused by the fractures in your rib. To treat this, we are giving you pain medications so that you can fully expand your lung and get enough oxygen in to your body. It will also be necessary for you to use oxygen while you are walking until your lungs and ribs are further healed.  Secondary Diagnosis:	Neuropathy  Instructions for follow-up, activity and diet:	Please continue to take your gabapentin three times a day.  Secondary Diagnosis:	Essential hypertension  Instructions for follow-up, activity and diet:	Please continue to take your Amlodipine and Toprol everyday. Principal Discharge DX:	Closed fracture of multiple ribs of left side, initial encounter  Goal:	Pain control  Instructions for follow-up, activity and diet:	The best treatment for rib fractures is pain control so that you can comfortably inhale deeply, allowing your lung to expand. For your pain, we have prescribed Percocet, which can be taken up to 4 times a day.  Secondary Diagnosis:	Chronic obstructive pulmonary disease, unspecified COPD type  Instructions for follow-up, activity and diet:	Please continue to take you Advair twice a day everyday and your Pro-Air as needed. You have also been started on Spiriva, which is a once a day medication for COPD.   While you were here at [facility], you also had a very mild COPD exacerbation, so you were started on a 5 day prednisone course. Please take 2 prednisone pills once a day on both Saturday and Sunday.   Please be sure to follow up with Dr Pierce within the next 2 weeks.  Secondary Diagnosis:	Breast cancer  Instructions for follow-up, activity and diet:	Please continue to take your Exemastane everyday.  Secondary Diagnosis:	Hypoxia  Instructions for follow-up, activity and diet:	This means low oxygen level, and was caused by the fractures in your rib. To treat this, we are giving you pain medications so that you can fully expand your lung and get enough oxygen in to your body. It will also be necessary for you to use oxygen while you are walking until your lungs and ribs are further healed.  Secondary Diagnosis:	Neuropathy  Instructions for follow-up, activity and diet:	Please continue to take your gabapentin three times a day.  Secondary Diagnosis:	Essential hypertension  Instructions for follow-up, activity and diet:	Please continue to take your Amlodipine and Toprol everyday. Principal Discharge DX:	Closed fracture of multiple ribs of left side, initial encounter  Goal:	Pain control  Instructions for follow-up, activity and diet:	The best treatment for rib fractures is pain control so that you can comfortably inhale deeply, allowing your lung to expand. For your pain, we have prescribed Percocet, which can be taken up to 4 times a day.  Secondary Diagnosis:	Chronic obstructive pulmonary disease, unspecified COPD type  Instructions for follow-up, activity and diet:	Please continue to take you Advair twice a day everyday and your Pro-Air as needed. You have also been started on Spiriva, which is a once a day medication for COPD.   While you were here at [facility], you also had a very mild COPD exacerbation, so you were started on a 5 day prednisone course. Please take 2 prednisone pills once a day on both Saturday and Sunday.   Please be sure to follow up with Dr Pierce within the next 2 weeks.  Secondary Diagnosis:	Breast cancer  Instructions for follow-up, activity and diet:	Please continue to take your Exemastane everyday.  Secondary Diagnosis:	Hypoxia  Instructions for follow-up, activity and diet:	This means low oxygen level, and was caused by the fractures in your rib. To treat this, we are giving you pain medications so that you can fully expand your lung and get enough oxygen in to your body. It will also be necessary for you to use oxygen while you are walking until your lungs and ribs are further healed.  Secondary Diagnosis:	Neuropathy  Instructions for follow-up, activity and diet:	Please continue to take your gabapentin three times a day.  Secondary Diagnosis:	Essential hypertension  Instructions for follow-up, activity and diet:	Please continue to take your Amlodipine and Toprol everyday. Principal Discharge DX:	Closed fracture of multiple ribs of left side, initial encounter  Goal:	Pain control  Instructions for follow-up, activity and diet:	The best treatment for rib fractures is pain control so that you can comfortably inhale deeply, allowing your lung to expand. For your pain, we have prescribed Percocet, which can be taken up to 4 times a day.  Secondary Diagnosis:	Chronic obstructive pulmonary disease, unspecified COPD type  Instructions for follow-up, activity and diet:	Please continue to take you Advair twice a day everyday and your Pro-Air as needed. You have also been started on Spiriva, which is a once a day medication for COPD.   While you were here at [facility], you also had a very mild COPD exacerbation, so you were started on a 5 day prednisone course. Please take 2 prednisone pills once a day on both Saturday and Sunday.   Please be sure to follow up with Dr Pierce within the next 2 weeks.  Secondary Diagnosis:	Breast cancer  Instructions for follow-up, activity and diet:	Please continue to take your Exemastane everyday.  Secondary Diagnosis:	Hypoxia  Instructions for follow-up, activity and diet:	This means low oxygen level, and was caused by the fractures in your rib. To treat this, we are giving you pain medications so that you can fully expand your lung and get enough oxygen in to your body. It will also be necessary for you to use oxygen while you are walking until your lungs and ribs are further healed.  Secondary Diagnosis:	Neuropathy  Instructions for follow-up, activity and diet:	Please continue to take your gabapentin three times a day.  Secondary Diagnosis:	Essential hypertension  Instructions for follow-up, activity and diet:	Please continue to take your Amlodipine and Toprol everyday.

## 2017-05-10 NOTE — DISCHARGE NOTE ADULT - PLAN OF CARE
Pain control The best treatment for rib fractures is pain control so that you can comfortably Please continue to take you Advair twice a day everyday and your Proair as needed. Please continue to take your Exemastane everyday. This means low oxygen level, and was caused by the fractures in your rib. To treat this, we are giving you pain medications so that you can fully expand your lung and get enough oxygen in to your body. Please continue to take your gabapentin three times a day. Please continue to take your Amlodipine and Toprol everyday. The best treatment for rib fractures is pain control so that you can comfortably inhale deeply, allowing your lung to expand. For your pain, we have prescribed Tramadol, which can be taken 3 times a day. Please continue to take you Advair twice a day everyday and your Pro-Air as needed. You have also been started on Spiriva, which is a once a day medication for COPD.   While you were here at [facility], you also had a very mild COPD exacerbation, so you were started on a 5 day prednisone course. Please take 2 prednisone pills once a day on both Saturday and Sunday.   Please be sure to follow up with Dr Pierce within the next 2 weeks. This means low oxygen level, and was caused by the fractures in your rib. To treat this, we are giving you pain medications so that you can fully expand your lung and get enough oxygen in to your body. It will also be necessary for you to use oxygen while you are walking until your lungs and ribs are further healed. The best treatment for rib fractures is pain control so that you can comfortably inhale deeply, allowing your lung to expand. For your pain, we have prescribed Percocet, which can be taken up to 4 times a day.

## 2017-05-10 NOTE — PROGRESS NOTE ADULT - SUBJECTIVE AND OBJECTIVE BOX
INTERVAL HPI/OVERNIGHT EVENTS:  No overnight events.   Patient states that she still gets out of breath when she has to walk to the bathroom. Pain on somewhat controlled.     VITAL SIGNS:  T(F): 98.8  HR: 60  BP: 115/70  RR: 18  SpO2: 94%  Wt(kg): --    PHYSICAL EXAM:    Constitutional: WDWN resting comfortably in bed; NAD  Head: NC/AT  Eyes: PERRL  ENT: tongue asymmetrical - superior on the left, moist mucus membranes, Mallampati 2    Neck: supple; no JVD or thyromegaly  Respiratory: scant wheezes throughout   Cardiac: +S1/S2; RRR; no M/R/G  Gastrointestinal: soft, NT/ND; no rebound or guarding; +BSx4  Back: spine midline, no bony tenderness or step-offs  Extremities: WWP, no clubbing or cyanosis; trace pitting edema at the ankles  Musculoskeletal: NROM x4; no joint swelling, tenderness or erythema  Vascular: 2+ DP pulses B/L  Dermatologic: skin warm, dry and intact  Lymphatic: no submandibular or cervical LAD  Neurologic: AAOx3; CNII-XII grossly intact;  sensory deficit in the right great toe  Psychiatric: affect and characteristics of appearance, verbalizations, behaviors are appropriate    MEDICATIONS  (STANDING):  gabapentin 300milliGRAM(s) Oral three times a day  atorvastatin 20milliGRAM(s) Oral at bedtime  metoprolol succinate ER 100milliGRAM(s) Oral daily  amLODIPine   Tablet 10milliGRAM(s) Oral daily  pantoprazole    Tablet 40milliGRAM(s) Oral before breakfast  cholecalciferol 1000Unit(s) Oral daily  buDESOnide 160 MICROgram(s)/formoterol 4.5 MICROgram(s) Inhaler 2Puff(s) Inhalation two times a day  multivitamin 1Tablet(s) Oral daily  exemestane 25milliGRAM(s) Oral after lunch  thiamine 100milliGRAM(s) Oral daily  folic acid 1milliGRAM(s) Oral daily    MEDICATIONS  (PRN):  acetaminophen   Tablet. 650milliGRAM(s) Oral every 6 hours PRN Moderate Pain (4 - 6)  ALBUTerol/ipratropium for Nebulization 3milliLiter(s) Nebulizer every 6 hours PRN Shortness of Breath and/or Wheezing  morphine  - Injectable 2milliGRAM(s) IV Push every 6 hours PRN Severe Pain (7 - 10)  LORazepam   Injectable 2milliGRAM(s) IV Push every 6 hours PRN Anxiety  traMADol 50milliGRAM(s) Oral every 8 hours PRN Moderate Pain (4 - 6)      Allergies    No Known Allergies    Intolerances        LABS:                        13.2   7.0   )-----------( 243      ( 10 May 2017 06:28 )             37.7     05-10    139  |  102  |  7   ----------------------------<  99  3.4<L>   |  29  |  0.58    Ca    8.6      10 May 2017 06:28  Mg     1.6     05-10    TPro  7.6  /  Alb  3.1<L>  /  TBili  1.1  /  DBili  x   /  AST  36  /  ALT  26  /  AlkPhos  116  05-09          RADIOLOGY & ADDITIONAL TESTS:    EXAM:  XR CHEST 1 VIEW PORT ROUTINE                          PROCEDURE DATE:  05/10/2017                     INTERPRETATION:  Indication: Shortness of Breath status post right upper   lobectomy December 2016    A single portable view of the chest is submitted. In comparison to prior   examinations dated 5 9/17 and 4/21/2017 there is further volume loss in   the right hemithorax with shift of the mediastinum. Increased markings   with evolving infiltrates are noted in the right lower lung. An effusion   is suspected. The left lung is clear.     Impression:    Further volume loss in the right hemithorax with suspected effusion and   evolving infiltrates. Findings may be due to atelectasis secondary to   endobronchial lesion.    A CT scan withcontrast is recommended for further assessment. INTERVAL HPI/OVERNIGHT EVENTS:  No overnight events.   Patient states that she still gets out of breath when she has to walk to the bathroom. Pain on somewhat controlled.     VITAL SIGNS:  T(F): 98.8  HR: 60  BP: 115/70  RR: 18  SpO2: 94%  Wt(kg): --    PHYSICAL EXAM:    Constitutional: WDWN resting comfortably in bed; NAD  Head: NC/AT  Eyes: PERRL  ENT: tongue asymmetrical - superior on the left, moist mucus membranes, Mallampati 2    Neck: supple; no JVD or thyromegaly  Respiratory: +rhonchi R>L, desaturated to 82% while ambulating on room air   Cardiac: +S1/S2; RRR; no M/R/G  Gastrointestinal: soft, NT/ND; no rebound or guarding; +BSx4  Back: spine midline, no bony tenderness or step-offs  Extremities: WWP, no clubbing or cyanosis; trace pitting edema at the ankles  Musculoskeletal: NROM x4; no joint swelling, tenderness or erythema  Vascular: 2+ DP pulses B/L  Dermatologic: skin warm, dry and intact  Lymphatic: no submandibular or cervical LAD  Neurologic: AAOx3; CNII-XII grossly intact;  sensory deficit in the right great toe  Psychiatric: affect and characteristics of appearance, verbalizations, behaviors are appropriate    MEDICATIONS  (STANDING):  gabapentin 300milliGRAM(s) Oral three times a day  atorvastatin 20milliGRAM(s) Oral at bedtime  metoprolol succinate ER 100milliGRAM(s) Oral daily  amLODIPine   Tablet 10milliGRAM(s) Oral daily  pantoprazole    Tablet 40milliGRAM(s) Oral before breakfast  cholecalciferol 1000Unit(s) Oral daily  buDESOnide 160 MICROgram(s)/formoterol 4.5 MICROgram(s) Inhaler 2Puff(s) Inhalation two times a day  multivitamin 1Tablet(s) Oral daily  exemestane 25milliGRAM(s) Oral after lunch  thiamine 100milliGRAM(s) Oral daily  folic acid 1milliGRAM(s) Oral daily    MEDICATIONS  (PRN):  acetaminophen   Tablet. 650milliGRAM(s) Oral every 6 hours PRN Moderate Pain (4 - 6)  ALBUTerol/ipratropium for Nebulization 3milliLiter(s) Nebulizer every 6 hours PRN Shortness of Breath and/or Wheezing  morphine  - Injectable 2milliGRAM(s) IV Push every 6 hours PRN Severe Pain (7 - 10)  LORazepam   Injectable 2milliGRAM(s) IV Push every 6 hours PRN Anxiety  traMADol 50milliGRAM(s) Oral every 8 hours PRN Moderate Pain (4 - 6)      Allergies    No Known Allergies    Intolerances        LABS:                        13.2   7.0   )-----------( 243      ( 10 May 2017 06:28 )             37.7     05-10    139  |  102  |  7   ----------------------------<  99  3.4<L>   |  29  |  0.58    Ca    8.6      10 May 2017 06:28  Mg     1.6     05-10    TPro  7.6  /  Alb  3.1<L>  /  TBili  1.1  /  DBili  x   /  AST  36  /  ALT  26  /  AlkPhos  116  05-09          RADIOLOGY & ADDITIONAL TESTS:    EXAM:  XR CHEST 1 VIEW PORT ROUTINE                          PROCEDURE DATE:  05/10/2017                     INTERPRETATION:  Indication: Shortness of Breath status post right upper   lobectomy December 2016    A single portable view of the chest is submitted. In comparison to prior   examinations dated 5 9/17 and 4/21/2017 there is further volume loss in   the right hemithorax with shift of the mediastinum. Increased markings   with evolving infiltrates are noted in the right lower lung. An effusion   is suspected. The left lung is clear.     Impression:    Further volume loss in the right hemithorax with suspected effusion and   evolving infiltrates. Findings may be due to atelectasis secondary to   endobronchial lesion.    A CT scan withcontrast is recommended for further assessment.

## 2017-05-10 NOTE — PROGRESS NOTE ADULT - PROBLEM SELECTOR PLAN 3
Not in exacerbation at the current time.   - Continue Advair, Duonebs Patient with rhonchi on second exam today.  - Start prednisone 20   - Continue Advair, Duonebs

## 2017-05-10 NOTE — DISCHARGE NOTE ADULT - CARE PROVIDERS DIRECT ADDRESSES
[email or web address],DirectAddress_Unknown

## 2017-05-10 NOTE — DISCHARGE NOTE ADULT - CARE PROVIDER_API CALL
Diego Landry), Internal Medicine; Nephrology  [address] [place] [address]  Phone: [phone]  Fax: [phone]    Dain Foreman), Thoracic Surgery  [address] [place] [address]  Phone: [phone]  Fax: [phone] Diego Landry), Internal Medicine; Nephrology  [address] [place] [address]  Phone: [phone]  Fax: [phone]    Dain Foreman), Thoracic Surgery  [address] [place] [address]  Phone: [phone]  Fax: [phone]    Janey Pierce), Critical Care Medicine; Pulmonary Disease  [address] [place] [address]  Phone: [phone]  Fax: [phone]

## 2017-05-10 NOTE — DISCHARGE NOTE ADULT - PATIENT PORTAL LINK FT
“You can access the Follow[facility] Patient Portal, offered by [facility], by registering with the following website: [email or web address]”

## 2017-05-10 NOTE — PROGRESS NOTE ADULT - PROBLEM SELECTOR PLAN 10
DVT ppx: SCDs  Replete electrolytes for a K of 4 and Mg of 2  DASH/TLC diet   Dispo: Admit to F for pain control DVT ppx: SCDs  Replete electrolytes for a K of 4 and Mg of 2  DASH/TLC diet   Dispo: RMF, home when medically stable

## 2017-05-10 NOTE — DISCHARGE NOTE ADULT - MEDICATION SUMMARY - MEDICATIONS TO TAKE
I will START or STAY ON the medications listed below when I get home from the hospital:    calcium citrate   500 with zinc  -- 2 tab(s) by mouth once a day (at bedtime)  -- Indication: For Nutrition, metabolism, and development symptoms    magnesium  -- 500 milligram(s) by mouth once a day  -- Indication: For Nutrition, metabolism, and development symptoms    vitamin b  complex  -- 1 tab(s) by mouth once a day  -- Indication: For Nutrition, metabolism, and development symptoms    predniSONE 20 mg oral tablet  -- 2 tab(s) by mouth once a day  -- Indication: For Chronic obstructive pulmonary disease    traMADol 50 mg oral tablet  -- 1 tab(s) by mouth every 6 hours, As needed, Severe Pain (7 - 10)  -- Indication: For Rib Fracture    acetaminophen 325 mg oral tablet  -- 2 tab(s) by mouth every 6 hours, As needed, Moderate Pain (4 - 6)  -- Indication: For Rib Fracture    gabapentin 300 mg oral capsule  -- 1 cap(s) by mouth 3 times a day  -- Indication: For Neuropathy    loratadine 10 mg oral tablet  -- 1 tab(s) by mouth once a day  -- Indication: For Chronic obstructive pulmonary disease    atorvastatin 20 mg oral tablet  -- 1 tab(s) by mouth once a day  -- Indication: For High cholesterol    exemestane 25 mg oral tablet  -- 1 tab(s) by mouth once a day  -- Indication: For Breast cancer    Toprol- mg oral tablet, extended release  -- 1 tab(s) by mouth once a day  -- Indication: For Essential hypertension    Advair Diskus 250 mcg-50 mcg inhalation powder  -- 1 puff(s) inhaled 2 times a day  -- Indication: For Chronic obstructive pulmonary disease    ProAir HFA 90 mcg/inh inhalation aerosol  -- 2 puff(s) inhaled 4 times a day, As Needed  -- Indication: For Chronic obstructive pulmonary disease    tiotropium 18 mcg inhalation capsule  -- 1 cap(s) inhaled once a day  -- Indication: For Chronic obstructive pulmonary disease    amLODIPine 10 mg oral tablet  -- 1 tab(s) by mouth once a day  -- Indication: For Essential hypertension    senna oral tablet  -- 2 tab(s) by mouth once a day (at bedtime)  -- Indication: For Constipation     Prevacid 15 mg oral delayed release capsule  -- 1 cap(s) by mouth once a day, As Needed for acid reflux  -- Indication: For Acid Reflux    Multiple Vitamins oral tablet  -- 1 tab(s) by mouth once a day  -- Indication: For Nutrition, metabolism, and development symptoms    folic acid 1 mg oral tablet  -- 1 tab(s) by mouth once a day  -- Indication: For Nutrition, metabolism, and development symptoms    thiamine 100 mg oral tablet  -- 1 tab(s) by mouth once a day  -- Indication: For Nutrition, metabolism, and development symptoms    Vitamin D3 2000 intl units oral capsule  -- 1 cap(s) by mouth once a day  -- Indication: For Nutrition, metabolism, and development symptoms I will START or STAY ON the medications listed below when I get home from the hospital:    calcium citrate   500 with zinc  -- 2 tab(s) by mouth once a day (at bedtime)  -- Indication: For Nutrition, metabolism, and development symptoms    magnesium  -- 500 milligram(s) by mouth once a day  -- Indication: For Nutrition, metabolism, and development symptoms    vitamin b  complex  -- 1 tab(s) by mouth once a day  -- Indication: For Nutrition, metabolism, and development symptoms    predniSONE 20 mg oral tablet  -- 2 tab(s) by mouth once a day  -- Indication: For Chronic obstructive pulmonary disease    acetaminophen 325 mg oral tablet  -- 2 tab(s) by mouth every 6 hours, As needed, Moderate Pain (4 - 6)  -- Indication: For Rib Fracture    gabapentin 300 mg oral capsule  -- 1 cap(s) by mouth 3 times a day  -- Indication: For Neuropathy    loratadine 10 mg oral tablet  -- 1 tab(s) by mouth once a day  -- Indication: For Chronic obstructive pulmonary disease    atorvastatin 20 mg oral tablet  -- 1 tab(s) by mouth once a day  -- Indication: For High cholesterol    exemestane 25 mg oral tablet  -- 1 tab(s) by mouth once a day  -- Indication: For Breast cancer    Toprol- mg oral tablet, extended release  -- 1 tab(s) by mouth once a day  -- Indication: For Essential hypertension    Advair Diskus 250 mcg-50 mcg inhalation powder  -- 1 puff(s) inhaled 2 times a day  -- Indication: For Chronic obstructive pulmonary disease    ProAir HFA 90 mcg/inh inhalation aerosol  -- 2 puff(s) inhaled 4 times a day, As Needed  -- Indication: For Chronic obstructive pulmonary disease    tiotropium 18 mcg inhalation capsule  -- 1 cap(s) inhaled once a day  -- Indication: For Chronic obstructive pulmonary disease    amLODIPine 10 mg oral tablet  -- 1 tab(s) by mouth once a day  -- Indication: For Essential hypertension    senna oral tablet  -- 2 tab(s) by mouth once a day (at bedtime)  -- Indication: For Constipation     Prevacid 15 mg oral delayed release capsule  -- 1 cap(s) by mouth once a day, As Needed for acid reflux  -- Indication: For Acid Reflux    Multiple Vitamins oral tablet  -- 1 tab(s) by mouth once a day  -- Indication: For Nutrition, metabolism, and development symptoms    folic acid 1 mg oral tablet  -- 1 tab(s) by mouth once a day  -- Indication: For Nutrition, metabolism, and development symptoms    thiamine 100 mg oral tablet  -- 1 tab(s) by mouth once a day  -- Indication: For Nutrition, metabolism, and development symptoms    Vitamin D3 2000 intl units oral capsule  -- 1 cap(s) by mouth once a day  -- Indication: For Nutrition, metabolism, and development symptoms I will START or STAY ON the medications listed below when I get home from the hospital:    calcium citrate   500 with zinc  -- 2 tab(s) by mouth once a day (at bedtime)  -- Indication: For Nutrition, metabolism, and development symptoms    magnesium  -- 500 milligram(s) by mouth once a day  -- Indication: For Nutrition, metabolism, and development symptoms    vitamin b  complex  -- 1 tab(s) by mouth once a day  -- Indication: For Nutrition, metabolism, and development symptoms    predniSONE 20 mg oral tablet  -- 2 tab(s) by mouth once a day  -- Indication: For Chronic obstructive pulmonary disease    gabapentin 300 mg oral capsule  -- 1 cap(s) by mouth 3 times a day  -- Indication: For Neuropathy    loratadine 10 mg oral tablet  -- 1 tab(s) by mouth once a day  -- Indication: For Chronic obstructive pulmonary disease    atorvastatin 20 mg oral tablet  -- 1 tab(s) by mouth once a day  -- Indication: For High cholesterol    exemestane 25 mg oral tablet  -- 1 tab(s) by mouth once a day  -- Indication: For Breast cancer    Toprol- mg oral tablet, extended release  -- 1 tab(s) by mouth once a day  -- Indication: For Essential hypertension    Advair Diskus 250 mcg-50 mcg inhalation powder  -- 1 puff(s) inhaled 2 times a day  -- Indication: For Chronic obstructive pulmonary disease    ProAir HFA 90 mcg/inh inhalation aerosol  -- 2 puff(s) inhaled 4 times a day, As Needed  -- Indication: For Chronic obstructive pulmonary disease    tiotropium 18 mcg inhalation capsule  -- 1 cap(s) inhaled once a day  -- Indication: For Chronic obstructive pulmonary disease    amLODIPine 10 mg oral tablet  -- 1 tab(s) by mouth once a day  -- Indication: For Essential hypertension    senna oral tablet  -- 2 tab(s) by mouth once a day (at bedtime)  -- Indication: For Constipation     Prevacid 15 mg oral delayed release capsule  -- 1 cap(s) by mouth once a day, As Needed for acid reflux  -- Indication: For Acid Reflux    Multiple Vitamins oral tablet  -- 1 tab(s) by mouth once a day  -- Indication: For Nutrition, metabolism, and development symptoms    folic acid 1 mg oral tablet  -- 1 tab(s) by mouth once a day  -- Indication: For Nutrition, metabolism, and development symptoms    thiamine 100 mg oral tablet  -- 1 tab(s) by mouth once a day  -- Indication: For Nutrition, metabolism, and development symptoms    Vitamin D3 2000 intl units oral capsule  -- 1 cap(s) by mouth once a day  -- Indication: For Nutrition, metabolism, and development symptoms

## 2017-05-10 NOTE — DISCHARGE NOTE ADULT - SECONDARY DIAGNOSIS.
Chronic obstructive pulmonary disease, unspecified COPD type Breast cancer Hypoxia Neuropathy Essential hypertension

## 2017-05-10 NOTE — DISCHARGE NOTE ADULT - HOSPITAL COURSE
62F PMHx of COPD, asthma, RUL lobectomy in December 2016 for a lung nodule, and breast cancer in 2014, presented to the ED for shortness of breath. Upon arrival to the ED, vitals were: T97.8, /83, HR 78, R18, SpO2 88%. Ribs XR showed multiple left sided fractures on the ED physician's read as well as the CT surgery NP's read, though te official read was negative for fractures. She was placed on 3L nasal cannula, and given 2mg Morphine. Admitted to [facility], where the patient underwent a LE doppler, which was negative for DVT. CXR the morning of the 10th showed a decrease in R lung volume and further shift of the mediastinum as compared to the night before. CT with IV contrast was done, showing ---  The patient' pain was well controlled with Tramadol and she was able to oxygenate on room air, and it was decided that she was stable for discharge with close follow up with Dr Foreman as well as her PCP, Dr Landry. 62F PMHx of COPD, asthma, RUL lobectomy in December 2016 for a lung nodule, and breast cancer in 2014, presented to the ED for shortness of breath. Upon arrival to the ED, vitals were: T97.8, /83, HR 78, R18, SpO2 88%. Ribs XR showed multiple left sided fractures on the ED physician's read as well as the CT surgery NP's read, though te official read was negative for fractures. She was placed on 3L nasal cannula, and given 2mg Morphine. Admitted to [facility], where the patient underwent a LE doppler, which was negative for DVT. CXR the morning of the 10th showed a decrease in R lung volume and further shift of the mediastinum as compared to the night before. CT with IV contrast was done, showing possible mucus plugging or middle lobe syndrome. Patient wheezing on exam, and a 5 days course of prednisone was started for a mild COPD exacerbation. Pulm(Stan) consulted for CT findings as well as hypoxia, who recommend outpatient follow up. Upon walking she desaturated to the 80s. Echocardiogram showed a EF of 55-60%.  The patient' pain was well controlled with Tramadol and Percocet, and it was decided that she was stable for discharge with home O2, with close follow up with Dr Foreman as well as her PCP, Dr Landry, and Dr Pierce.

## 2017-05-10 NOTE — PROGRESS NOTE ADULT - PROBLEM SELECTOR PLAN 2
Likely 2/2 splinting from pain of broken ribs. Do not feel that patient is having an asthma or COPD exacerbation at the current time, as there has been no increase in her sputum production or cough.  CXR showing Further volume loss in the right hemithorax with suspected effusion and   evolving infiltrates. Findings may be due to atelectasis secondary to   endobronchial lesion.  - Obtain chest CT with IV contrast  - Continue Home Advair  - Start Duonebs q6 PRN  - Continue NC

## 2017-05-10 NOTE — DISCHARGE NOTE ADULT - NS MD DC FALL RISK RISK
For information on Fall & Injury Prevention, visit [email or web address]

## 2017-05-10 NOTE — PROGRESS NOTE ADULT - ATTENDING COMMENTS
Seen and examined by me this morning on rounds. States having pain underneath L breast, somewhat controlled with morphine. Labored breathing when walking and desaturation noted. On exam +wheezes/rhonchi B/L, will treat for likely COPD exacerbation, start prednisone and c/w advair and change nebs to ATC, monitor O2 sat closely. SOB likely due to pain and now w/ COPD exacerbation. CXR shows volume loss on R hemithorax, will pursue CT of chest with contrast, this could also explain the SOB and desaturation. C/w morphine IV prn for pain control, trial of tramadol PO and add bowel regimen. Pending CT chest results and improvement on O2 Sat anticipate dc home tomorrow. CTS on board, apprec recs. D/w PMD. Hypokalemia replaced. Rest as above. Seen and examined by me this morning on rounds. States having pain underneath L breast, somewhat controlled with morphine. Labored breathing when walking and desaturation noted. On exam +wheezes/rhonchi B/L, will treat for likely COPD exacerbation, start prednisone and c/w advair and change nebs to ATC, monitor O2 sat closely and encouraged IS use. SOB likely due to pain and now w/ COPD exacerbation. CXR shows volume loss on R hemithorax, will pursue CT of chest with contrast, this could also explain the SOB and desaturation. No e/o rib fracture per final XR reading. Duplex LE neg for DVT, less likely to be PE. C/w morphine IV prn for pain control, trial of tramadol PO and add bowel regimen. Pending CT chest results and improvement on O2 Sat anticipate dc home tomorrow. CTS on board, apprec recs. D/w PMD. Hypokalemia replaced. Rest as above.

## 2017-05-10 NOTE — PROGRESS NOTE ADULT - PROBLEM SELECTOR PLAN 1
S/p fall onto furniture resulting on fracture of multiple left sided rib fractures. Patient hypoxic and with likely atelectasis from splinting 2/2 pain.   - Continue with NC to maintain on O2 sat >92  - Pain control with Tramadol 50mg q8 and Morphine for break through pain  - Incentive spirometry S/p fall onto furniture resulting on fracture of multiple left sided rib fractures. Patient hypoxic and with likely atelectasis from splinting 2/2 pain.   - Continue with NC to maintain on O2 sat >92  - Pain control with Tramadol 50mg q8 and Morphine for break through pain  - Incentive spirometry  - CT chest with IV contrast

## 2017-05-10 NOTE — DISCHARGE NOTE ADULT - PROVIDER TOKENS
TOKEN:'[id number]:'[id number] TOKEN:'[id number]:'[id number]:'[id number]'

## 2017-05-11 DIAGNOSIS — S22.43XA MULTIPLE FRACTURES OF RIBS, BILATERAL, INITIAL ENCOUNTER FOR CLOSED FRACTURE: ICD-10-CM

## 2017-05-11 DIAGNOSIS — R09.02 HYPOXEMIA: ICD-10-CM

## 2017-05-11 DIAGNOSIS — R91.8 OTHER NONSPECIFIC ABNORMAL FINDING OF LUNG FIELD: ICD-10-CM

## 2017-05-11 LAB
ANION GAP SERPL CALC-SCNC: 10 MMOL/L — SIGNIFICANT CHANGE UP (ref 9–16)
BUN SERPL-MCNC: 6 MG/DL — LOW (ref 7–23)
CALCIUM SERPL-MCNC: 9.3 MG/DL — SIGNIFICANT CHANGE UP (ref 8.5–10.5)
CHLORIDE SERPL-SCNC: 102 MMOL/L — SIGNIFICANT CHANGE UP (ref 96–108)
CO2 SERPL-SCNC: 26 MMOL/L — SIGNIFICANT CHANGE UP (ref 22–31)
CREAT SERPL-MCNC: 0.53 MG/DL — SIGNIFICANT CHANGE UP (ref 0.5–1.3)
GLUCOSE SERPL-MCNC: 108 MG/DL — HIGH (ref 70–99)
HBA1C BLD-MCNC: 5.6 % — SIGNIFICANT CHANGE UP (ref 4.8–5.6)
HCT VFR BLD CALC: 41 % — SIGNIFICANT CHANGE UP (ref 34.5–45)
HGB BLD-MCNC: 14.5 G/DL — SIGNIFICANT CHANGE UP (ref 11.5–15.5)
MAGNESIUM SERPL-MCNC: 2.1 MG/DL — SIGNIFICANT CHANGE UP (ref 1.6–2.4)
MCHC RBC-ENTMCNC: 35.4 G/DL — SIGNIFICANT CHANGE UP (ref 32–36)
MCHC RBC-ENTMCNC: 36.9 PG — HIGH (ref 27–34)
MCV RBC AUTO: 104.3 FL — HIGH (ref 80–100)
PLATELET # BLD AUTO: 264 K/UL — SIGNIFICANT CHANGE UP (ref 150–400)
POTASSIUM SERPL-MCNC: 3.9 MMOL/L — SIGNIFICANT CHANGE UP (ref 3.5–5.3)
POTASSIUM SERPL-SCNC: 3.9 MMOL/L — SIGNIFICANT CHANGE UP (ref 3.5–5.3)
RBC # BLD: 3.93 M/UL — SIGNIFICANT CHANGE UP (ref 3.8–5.2)
RBC # FLD: 14.4 % — SIGNIFICANT CHANGE UP (ref 10.3–16.9)
SODIUM SERPL-SCNC: 138 MMOL/L — SIGNIFICANT CHANGE UP (ref 135–145)
WBC # BLD: 9.2 K/UL — SIGNIFICANT CHANGE UP (ref 3.8–10.5)
WBC # FLD AUTO: 9.2 K/UL — SIGNIFICANT CHANGE UP (ref 3.8–10.5)

## 2017-05-11 PROCEDURE — 99223 1ST HOSP IP/OBS HIGH 75: CPT | Mod: GC

## 2017-05-11 PROCEDURE — 99233 SBSQ HOSP IP/OBS HIGH 50: CPT | Mod: GC

## 2017-05-11 PROCEDURE — 71010: CPT | Mod: 26

## 2017-05-11 PROCEDURE — 93306 TTE W/DOPPLER COMPLETE: CPT | Mod: 26

## 2017-05-11 RX ORDER — SODIUM CHLORIDE 9 MG/ML
1000 INJECTION, SOLUTION INTRAVENOUS
Qty: 0 | Refills: 0 | Status: DISCONTINUED | OUTPATIENT
Start: 2017-05-11 | End: 2017-05-12

## 2017-05-11 RX ORDER — DEXTROSE 50 % IN WATER 50 %
12.5 SYRINGE (ML) INTRAVENOUS ONCE
Qty: 0 | Refills: 0 | Status: DISCONTINUED | OUTPATIENT
Start: 2017-05-11 | End: 2017-05-12

## 2017-05-11 RX ORDER — DEXTROSE 50 % IN WATER 50 %
25 SYRINGE (ML) INTRAVENOUS ONCE
Qty: 0 | Refills: 0 | Status: DISCONTINUED | OUTPATIENT
Start: 2017-05-11 | End: 2017-05-12

## 2017-05-11 RX ORDER — TRAMADOL HYDROCHLORIDE 50 MG/1
50 TABLET ORAL EVERY 6 HOURS
Qty: 0 | Refills: 0 | Status: DISCONTINUED | OUTPATIENT
Start: 2017-05-11 | End: 2017-05-12

## 2017-05-11 RX ORDER — INSULIN LISPRO 100/ML
VIAL (ML) SUBCUTANEOUS
Qty: 0 | Refills: 0 | Status: DISCONTINUED | OUTPATIENT
Start: 2017-05-11 | End: 2017-05-12

## 2017-05-11 RX ORDER — TIOTROPIUM BROMIDE 18 UG/1
1 CAPSULE ORAL; RESPIRATORY (INHALATION) DAILY
Qty: 0 | Refills: 0 | Status: DISCONTINUED | OUTPATIENT
Start: 2017-05-11 | End: 2017-05-12

## 2017-05-11 RX ORDER — LORATADINE 10 MG/1
10 TABLET ORAL DAILY
Qty: 0 | Refills: 0 | Status: DISCONTINUED | OUTPATIENT
Start: 2017-05-11 | End: 2017-05-12

## 2017-05-11 RX ORDER — DEXTROSE 50 % IN WATER 50 %
1 SYRINGE (ML) INTRAVENOUS ONCE
Qty: 0 | Refills: 0 | Status: DISCONTINUED | OUTPATIENT
Start: 2017-05-11 | End: 2017-05-12

## 2017-05-11 RX ORDER — SENNA PLUS 8.6 MG/1
2 TABLET ORAL AT BEDTIME
Qty: 0 | Refills: 0 | Status: DISCONTINUED | OUTPATIENT
Start: 2017-05-11 | End: 2017-05-12

## 2017-05-11 RX ORDER — TRAMADOL HYDROCHLORIDE 50 MG/1
50 TABLET ORAL EVERY 6 HOURS
Qty: 0 | Refills: 0 | Status: DISCONTINUED | OUTPATIENT
Start: 2017-05-11 | End: 2017-05-11

## 2017-05-11 RX ORDER — DOCUSATE SODIUM 100 MG
100 CAPSULE ORAL
Qty: 0 | Refills: 0 | Status: DISCONTINUED | OUTPATIENT
Start: 2017-05-11 | End: 2017-05-12

## 2017-05-11 RX ORDER — GLUCAGON INJECTION, SOLUTION 0.5 MG/.1ML
1 INJECTION, SOLUTION SUBCUTANEOUS ONCE
Qty: 0 | Refills: 0 | Status: DISCONTINUED | OUTPATIENT
Start: 2017-05-11 | End: 2017-05-12

## 2017-05-11 RX ADMIN — TRAMADOL HYDROCHLORIDE 50 MILLIGRAM(S): 50 TABLET ORAL at 07:56

## 2017-05-11 RX ADMIN — AMLODIPINE BESYLATE 10 MILLIGRAM(S): 2.5 TABLET ORAL at 06:25

## 2017-05-11 RX ADMIN — MORPHINE SULFATE 2 MILLIGRAM(S): 50 CAPSULE, EXTENDED RELEASE ORAL at 12:41

## 2017-05-11 RX ADMIN — TIOTROPIUM BROMIDE 1 CAPSULE(S): 18 CAPSULE ORAL; RESPIRATORY (INHALATION) at 17:25

## 2017-05-11 RX ADMIN — MORPHINE SULFATE 2 MILLIGRAM(S): 50 CAPSULE, EXTENDED RELEASE ORAL at 12:56

## 2017-05-11 RX ADMIN — BUDESONIDE AND FORMOTEROL FUMARATE DIHYDRATE 2 PUFF(S): 160; 4.5 AEROSOL RESPIRATORY (INHALATION) at 06:25

## 2017-05-11 RX ADMIN — SENNA PLUS 2 TABLET(S): 8.6 TABLET ORAL at 21:30

## 2017-05-11 RX ADMIN — Medication 1 TABLET(S): at 12:01

## 2017-05-11 RX ADMIN — Medication 100 MILLIGRAM(S): at 06:25

## 2017-05-11 RX ADMIN — Medication 40 MILLIGRAM(S): at 06:25

## 2017-05-11 RX ADMIN — TRAMADOL HYDROCHLORIDE 50 MILLIGRAM(S): 50 TABLET ORAL at 20:05

## 2017-05-11 RX ADMIN — Medication 3 MILLILITER(S): at 06:25

## 2017-05-11 RX ADMIN — Medication 1000 UNIT(S): at 12:41

## 2017-05-11 RX ADMIN — Medication 3 MILLILITER(S): at 21:30

## 2017-05-11 RX ADMIN — Medication 5 MILLIGRAM(S): at 21:30

## 2017-05-11 RX ADMIN — Medication 3 MILLILITER(S): at 12:02

## 2017-05-11 RX ADMIN — Medication 3 MILLILITER(S): at 17:24

## 2017-05-11 RX ADMIN — BUDESONIDE AND FORMOTEROL FUMARATE DIHYDRATE 2 PUFF(S): 160; 4.5 AEROSOL RESPIRATORY (INHALATION) at 17:29

## 2017-05-11 RX ADMIN — GABAPENTIN 300 MILLIGRAM(S): 400 CAPSULE ORAL at 13:58

## 2017-05-11 RX ADMIN — PANTOPRAZOLE SODIUM 40 MILLIGRAM(S): 20 TABLET, DELAYED RELEASE ORAL at 06:25

## 2017-05-11 RX ADMIN — Medication 100 MILLIGRAM(S): at 12:01

## 2017-05-11 RX ADMIN — TRAMADOL HYDROCHLORIDE 50 MILLIGRAM(S): 50 TABLET ORAL at 06:57

## 2017-05-11 RX ADMIN — Medication 100 MILLIGRAM(S): at 17:29

## 2017-05-11 RX ADMIN — GABAPENTIN 300 MILLIGRAM(S): 400 CAPSULE ORAL at 06:25

## 2017-05-11 RX ADMIN — TRAMADOL HYDROCHLORIDE 50 MILLIGRAM(S): 50 TABLET ORAL at 13:58

## 2017-05-11 RX ADMIN — ATORVASTATIN CALCIUM 20 MILLIGRAM(S): 80 TABLET, FILM COATED ORAL at 21:30

## 2017-05-11 RX ADMIN — LORATADINE 10 MILLIGRAM(S): 10 TABLET ORAL at 17:24

## 2017-05-11 RX ADMIN — Medication 1 MILLIGRAM(S): at 12:01

## 2017-05-11 RX ADMIN — EXEMESTANE 25 MILLIGRAM(S): 25 TABLET, SUGAR COATED ORAL at 14:01

## 2017-05-11 RX ADMIN — GABAPENTIN 300 MILLIGRAM(S): 400 CAPSULE ORAL at 21:31

## 2017-05-11 RX ADMIN — TRAMADOL HYDROCHLORIDE 50 MILLIGRAM(S): 50 TABLET ORAL at 21:05

## 2017-05-11 NOTE — PROGRESS NOTE ADULT - ASSESSMENT
62F PMHx of COPD, asthma, RUL lobectomy in december 2016 for a lung nodule, and breast cancer in 2014, admitted for hypoxia in the setting of rib fracture, now with COPD exacerbation.

## 2017-05-11 NOTE — PROGRESS NOTE ADULT - SUBJECTIVE AND OBJECTIVE BOX
INTERVAL HPI/OVERNIGHT EVENTS:  No overnight events.   Patient states that she is still having 7/10 pain on her left thorax. Still with some SOB, does not feel tight or wheezy.     VITAL SIGNS:  T(F): 97.7  HR: 74  BP: 145/80  RR: 17  SpO2: 97%  Wt(kg): --    PHYSICAL EXAM:    Constitutional:  Eyes:  ENMT:  Neck:  Respiratory:  Cardiovascular:  Gastrointestinal:  Extremities:  Vascular:  Neurological:  Musculoskeletal:    MEDICATIONS  (STANDING):  gabapentin 300milliGRAM(s) Oral three times a day  atorvastatin 20milliGRAM(s) Oral at bedtime  metoprolol succinate ER 100milliGRAM(s) Oral daily  amLODIPine   Tablet 10milliGRAM(s) Oral daily  pantoprazole    Tablet 40milliGRAM(s) Oral before breakfast  cholecalciferol 1000Unit(s) Oral daily  buDESOnide 160 MICROgram(s)/formoterol 4.5 MICROgram(s) Inhaler 2Puff(s) Inhalation two times a day  multivitamin 1Tablet(s) Oral daily  exemestane 25milliGRAM(s) Oral after lunch  thiamine 100milliGRAM(s) Oral daily  folic acid 1milliGRAM(s) Oral daily  calcium carbonate 1250 mG (OsCal) 1Tablet(s) Oral daily  predniSONE   Tablet 40milliGRAM(s) Oral daily  ALBUTerol/ipratropium for Nebulization 3milliLiter(s) Nebulizer every 4 hours  bisacodyl 5milliGRAM(s) Oral at bedtime    MEDICATIONS  (PRN):  acetaminophen   Tablet. 650milliGRAM(s) Oral every 6 hours PRN Mild Pain (1 - 3)  morphine  - Injectable 2milliGRAM(s) IV Push every 6 hours PRN Severe Pain (7 - 10)  LORazepam   Injectable 2milliGRAM(s) IV Push every 6 hours PRN Anxiety  traMADol 50milliGRAM(s) Oral every 8 hours PRN Moderate Pain (4 - 6)      Allergies    No Known Allergies    Intolerances        LABS:                        14.5   9.2   )-----------( 264      ( 11 May 2017 07:16 )             41.0     05-11    138  |  102  |  6<L>  ----------------------------<  108<H>  3.9   |  26  |  0.53    Ca    9.3      11 May 2017 07:14  Mg     2.1     05-11    TPro  7.6  /  Alb  3.1<L>  /  TBili  1.1  /  DBili  x   /  AST  36  /  ALT  26  /  AlkPhos  116  05-09          RADIOLOGY & ADDITIONAL TESTS: INTERVAL HPI/OVERNIGHT EVENTS:  No overnight events.   Patient states that she is still having 7/10 pain on her left thorax. Still with some SOB, does not feel tight or wheezy.     VITAL SIGNS:  T(F): 97.7  HR: 74  BP: 145/80  RR: 17  SpO2: 97%  Wt(kg): --    PHYSICAL EXAM:    Constitutional: WDWN resting comfortably in bed; NAD  Head: NC/AT  Eyes: PERRL  ENT: tongue asymmetrical - superior on the left, moist mucus membranes, Mallampati 2    Neck: supple; no JVD or thyromegaly  Respiratory: expiratory wheezes b/l   Cardiac: +S1/S2; RRR; no M/R/G  Gastrointestinal: soft, NT/ND; no rebound or guarding; +BSx4  Back: spine midline, no bony tenderness or step-offs  Extremities: WWP, no clubbing or cyanosis; trace pitting edema at the ankles  Musculoskeletal: NROM x4; no joint swelling, tenderness or erythema  Vascular: 2+ DP pulses B/L  Dermatologic: skin warm, dry and intact  Lymphatic: no submandibular or cervical LAD  Neurologic: AAOx3; CNII-XII grossly intact;  sensory deficit in the right great toe  Psychiatric: affect and characteristics of appearance, verbalizations, behaviors are appropriate    MEDICATIONS  (STANDING):  gabapentin 300milliGRAM(s) Oral three times a day  atorvastatin 20milliGRAM(s) Oral at bedtime  metoprolol succinate ER 100milliGRAM(s) Oral daily  amLODIPine   Tablet 10milliGRAM(s) Oral daily  pantoprazole    Tablet 40milliGRAM(s) Oral before breakfast  cholecalciferol 1000Unit(s) Oral daily  buDESOnide 160 MICROgram(s)/formoterol 4.5 MICROgram(s) Inhaler 2Puff(s) Inhalation two times a day  multivitamin 1Tablet(s) Oral daily  exemestane 25milliGRAM(s) Oral after lunch  thiamine 100milliGRAM(s) Oral daily  folic acid 1milliGRAM(s) Oral daily  calcium carbonate 1250 mG (OsCal) 1Tablet(s) Oral daily  predniSONE   Tablet 40milliGRAM(s) Oral daily  ALBUTerol/ipratropium for Nebulization 3milliLiter(s) Nebulizer every 4 hours  bisacodyl 5milliGRAM(s) Oral at bedtime    MEDICATIONS  (PRN):  acetaminophen   Tablet. 650milliGRAM(s) Oral every 6 hours PRN Mild Pain (1 - 3)  morphine  - Injectable 2milliGRAM(s) IV Push every 6 hours PRN Severe Pain (7 - 10)  LORazepam   Injectable 2milliGRAM(s) IV Push every 6 hours PRN Anxiety  traMADol 50milliGRAM(s) Oral every 8 hours PRN Moderate Pain (4 - 6)      Allergies    No Known Allergies    Intolerances        LABS:                        14.5   9.2   )-----------( 264      ( 11 May 2017 07:16 )             41.0     05-11    138  |  102  |  6<L>  ----------------------------<  108<H>  3.9   |  26  |  0.53    Ca    9.3      11 May 2017 07:14  Mg     2.1     05-11    TPro  7.6  /  Alb  3.1<L>  /  TBili  1.1  /  DBili  x   /  AST  36  /  ALT  26  /  AlkPhos  116  05-09          RADIOLOGY & ADDITIONAL TESTS:

## 2017-05-11 NOTE — CONSULT NOTE ADULT - SUBJECTIVE AND OBJECTIVE BOX
63 yo F, PMH of COPD, Asthma, s/p RUL lobectomy in December 2016 for stage I squamous cell ca, SCC of base of the tongue, h/o breast ca, presented with increasing SOTO that started and progressively worsened since she fell last Friday and fractured her ribs. She usually is able to walk 1-5 blocks (varies) w/o being SOB and now noticed she couldn't reach the bathroom. c/o severe L sided chest pain preventing her from taking deep breaths. Denies any fever/chills, cough and sputum production are less than usual.   Prior to her injury she was taking Advair and using her rescue inhaler ~2 times/daily. +allergies with nasal congestion.  Prior smoker - quit last December, ~40 PY history.  Currently feels well, breathing comfortably, hasn't been ambulating much yet.      REVIEW OF SYSTEMS: as per HPI - rest is negative    PAST MEDICAL & SURGICAL HISTORY:  Tongue lesion  COPD (chronic obstructive pulmonary disease)  Lung nodule: right  Asthma  High cholesterol  Hypertension  Breast cancer: right  History of thoracic surgery: R VATS RA RUL wedge resection with LN bx; nov 2016; &amp; Dec 2016  History of surgery: tongue surgery sept 2016  Elective surgery: lymph node excision april 28 2014  H/O right knee surgery  History of lumpectomy of right breast      FAMILY HISTORY:  Family history of lung cancer (Father)  Family history of breast cancer (Mother)  No pertinent family history in first degree relatives      SOCIAL HISTORY:  Smoking Status: [ ] Current, [x ] Former, [ ] Never  Pack Years:    MEDICATIONS:  Pulmonary:  buDESOnide 160 MICROgram(s)/formoterol 4.5 MICROgram(s) Inhaler 2Puff(s) Inhalation two times a day  ALBUTerol/ipratropium for Nebulization 3milliLiter(s) Nebulizer every 4 hours  loratadine 10milliGRAM(s) Oral daily    Antimicrobials:    Anticoagulants:    Onc:  exemestane 25milliGRAM(s) Oral after lunch    GI/:  pantoprazole    Tablet 40milliGRAM(s) Oral before breakfast  bisacodyl 5milliGRAM(s) Oral at bedtime  senna 2Tablet(s) Oral at bedtime  docusate sodium 100milliGRAM(s) Oral two times a day    Endocrine:  atorvastatin 20milliGRAM(s) Oral at bedtime  predniSONE   Tablet 40milliGRAM(s) Oral daily  insulin lispro (HumaLOG) corrective regimen sliding scale  SubCutaneous three times a day before meals  dextrose Gel 1Dose(s) Oral once PRN  dextrose 50% Injectable 12.5Gram(s) IV Push once  dextrose 50% Injectable 25Gram(s) IV Push once  dextrose 50% Injectable 25Gram(s) IV Push once  glucagon  Injectable 1milliGRAM(s) IntraMuscular once PRN    Cardiac:  metoprolol succinate ER 100milliGRAM(s) Oral daily  amLODIPine   Tablet 10milliGRAM(s) Oral daily    Other Medications:  acetaminophen   Tablet. 650milliGRAM(s) Oral every 6 hours PRN  gabapentin 300milliGRAM(s) Oral three times a day  cholecalciferol 1000Unit(s) Oral daily  multivitamin 1Tablet(s) Oral daily  morphine  - Injectable 2milliGRAM(s) IV Push every 6 hours PRN  LORazepam   Injectable 2milliGRAM(s) IV Push every 6 hours PRN  thiamine 100milliGRAM(s) Oral daily  folic acid 1milliGRAM(s) Oral daily  calcium carbonate 1250 mG (OsCal) 1Tablet(s) Oral daily  dextrose 5%. 1000milliLiter(s) IV Continuous <Continuous>  traMADol 50milliGRAM(s) Oral every 6 hours PRN      Allergies    No Known Allergies    Intolerances        Vital Signs Last 24 Hrs  T(C): 36.8, Max: 36.9 (05-10 @ 16:45)  T(F): 98.2, Max: 98.5 (05-10 @ 16:45)  HR: 70 (70 - 75)  BP: 131/72 (124/70 - 150/74)  BP(mean): --  RR: 18 (17 - 19)  SpO2: 99% (96% - 99%)    I & Os for current day (as of 05-11 @ 13:02)  =============================================  IN: 0 ml / OUT: 1 ml / NET: -1 ml        PHYSICAL EXAM:    General: Well developed; well nourished; in no acute distress  Eyes: PERRL, EOM intact; conjunctiva and sclera clear  Head: Normocephalic; atraumatic  ENMT: No nasal discharge; airway clear  Neck: Supple;   Respiratory: decreased BS mostly at the bases, diffuse exp wheezing  Cardiovascular: Regular rate and rhythm. S1 and S2 Normal;   Gastrointestinal: Soft non-tender non-distended  Extremities: Normal range of motion, No clubbing, cyanosis or edema  Vascular: Peripheral pulses palpable 2+ bilaterally  Neurological: Alert and oriented x3  Skin: Warm and dry. No obvious rash  Musculoskeletal: Normal tone, without deformities  Psychiatric: Cooperative and appropriate mood    LABS:      CBC Full  -  ( 11 May 2017 07:16 )  WBC Count : 9.2 K/uL  Hemoglobin : 14.5 g/dL  Hematocrit : 41.0 %  Platelet Count - Automated : 264 K/uL  Mean Cell Volume : 104.3 fL  Mean Cell Hemoglobin : 36.9 pg  Mean Cell Hemoglobin Concentration : 35.4 g/dL  Auto Neutrophil # : x  Auto Lymphocyte # : x  Auto Monocyte # : x  Auto Eosinophil # : x  Auto Basophil # : x  Auto Neutrophil % : x  Auto Lymphocyte % : x  Auto Monocyte % : x  Auto Eosinophil % : x  Auto Basophil % : x    05-11    138  |  102  |  6<L>  ----------------------------<  108<H>  3.9   |  26  |  0.53    Ca    9.3      11 May 2017 07:14  Mg     2.1     05-11    TPro  7.6  /  Alb  3.1<L>  /  TBili  1.1  /  DBili  x   /  AST  36  /  ALT  26  /  AlkPhos  116  05-09                      RADIOLOGY & ADDITIONAL STUDIES (The following images were personally reviewed): CT chest, CXR

## 2017-05-11 NOTE — CONSULT NOTE ADULT - PROBLEM SELECTOR RECOMMENDATION 2
mild obstruction on PFTs in december. Now post RUL lobectomy. Probable Asthma/COPD overlap syndrome.  At this point does not seem to be a COPD exacerbation, however given the frequent use of her rescue inhaler - not on optimized medical therapy. Allergies might also contribute to her sx.  - c/w home Advair  - Recommend adding Spiriva   - Recommend adding Claritin/Zyrtec mild obstruction on PFTs in december. Now post RUL lobectomy. Probable Asthma/COPD overlap syndrome.  At this point does not seem to be a COPD exacerbation, however given the frequent use of her rescue inhaler - not on optimized medical therapy. Allergies might also contribute to her sx.  - c/w home Advair  - Recommend adding Spiriva   - Recommend adding Claritin/Zyrtec  - Will need out pt follow up including PFTs once acute episode resolves to establish new baseline post procedure

## 2017-05-11 NOTE — PROGRESS NOTE ADULT - SUBJECTIVE AND OBJECTIVE BOX
Patient discussed on morning rounds with Dr. Foreman     SUBJECTIVE ASSESSMENT:  Patient seen this morning at bedside, patient states she is still very winded when she walks but improved compared to when she first came in. Also states her left sided chest pain has improved to a 7/10 compared to a 12 yesterday. Patient denies any other complaints.       Vital Signs Last 24 Hrs  T(C): 36.8, Max: 36.9 (05-10 @ 16:45)  T(F): 98.2, Max: 98.5 (05-10 @ 16:45)  HR: 70 (70 - 75)  BP: 131/72 (124/70 - 150/74)  BP(mean): --  RR: 18 (17 - 19)  SpO2: 99% (96% - 99%)  I&O's Detail    I & Os for current day (as of 11 May 2017 14:33)  =============================================  IN:    Total IN: 0 ml  ---------------------------------------------  OUT:    Voided: 1 ml    Total OUT: 1 ml  ---------------------------------------------  Total NET: -1 ml      PHYSICAL EXAM:    General: Patient sitting comfortably in bed, no acute distress     Neurological: Alert and oriented. No focal neurological deficits     Cardiovascular: S1S2, RRR, no murmurs appreciated on exam     Respiratory: Clear to ausculation bilaterally. Tenderness to palpation along left lateral chest     Gastrointestinal: Abdomen soft, non tender, non distended     Extremities: Warm and well perfused. No edema or calf tenderness     Vascular: Peripheral pulses 2+ bilaterally     LABS:                        14.5   9.2   )-----------( 264      ( 11 May 2017 07:16 )             41.0       05-11    138  |  102  |  6<L>  ----------------------------<  108<H>  3.9   |  26  |  0.53    Ca    9.3      11 May 2017 07:14  Mg     2.1     05-11    TPro  7.6  /  Alb  3.1<L>  /  TBili  1.1  /  DBili  x   /  AST  36  /  ALT  26  /  AlkPhos  116  05-09      MEDICATIONS  (STANDING):  gabapentin 300milliGRAM(s) Oral three times a day  atorvastatin 20milliGRAM(s) Oral at bedtime  metoprolol succinate ER 100milliGRAM(s) Oral daily  amLODIPine   Tablet 10milliGRAM(s) Oral daily  pantoprazole    Tablet 40milliGRAM(s) Oral before breakfast  cholecalciferol 1000Unit(s) Oral daily  buDESOnide 160 MICROgram(s)/formoterol 4.5 MICROgram(s) Inhaler 2Puff(s) Inhalation two times a day  multivitamin 1Tablet(s) Oral daily  exemestane 25milliGRAM(s) Oral after lunch  thiamine 100milliGRAM(s) Oral daily  folic acid 1milliGRAM(s) Oral daily  calcium carbonate 1250 mG (OsCal) 1Tablet(s) Oral daily  predniSONE   Tablet 40milliGRAM(s) Oral daily  ALBUTerol/ipratropium for Nebulization 3milliLiter(s) Nebulizer every 4 hours  bisacodyl 5milliGRAM(s) Oral at bedtime  insulin lispro (HumaLOG) corrective regimen sliding scale  SubCutaneous Before meals and at bedtime  loratadine 10milliGRAM(s) Oral daily  senna 2Tablet(s) Oral at bedtime  docusate sodium 100milliGRAM(s) Oral two times a day    MEDICATIONS  (PRN):  acetaminophen   Tablet. 650milliGRAM(s) Oral every 6 hours PRN Mild Pain (1 - 3)  LORazepam   Injectable 2milliGRAM(s) IV Push every 6 hours PRN Anxiety  dextrose Gel 1Dose(s) Oral once PRN Blood Glucose LESS THAN 70 milliGRAM(s)/deciliter  glucagon  Injectable 1milliGRAM(s) IntraMuscular once PRN Glucose LESS THAN 70 milligrams/deciliter  traMADol 50milliGRAM(s) Oral every 6 hours PRN Severe Pain (7 - 10)      RADIOLOGY & ADDITIONAL TESTS:  5/11/17 CXR: Pending official read, no obvious pneumothorax or pleural effusions.   5/10/17 CT chest: 1. Post interval right upper lobe lobectomywith postsurgical changes characterized by surgical clips in the region of the right hilum as well as right hemithorax volume loss and right-sided pleural effusion.  2. Scattered small and large airway inflammation.  3. Partial atelectasis of theright middle lobe with narrowing of the right middle lobe bronchus. Etiology this is unclear and could represent a right middle lobe syndrome and/or the presence of mucous plugging. Bronchoscopic correlation. No definite central right hilar mass is noted.  4. Subpleural interlobular septal thickening in the region the right middle lobe which may represent the sequela of prior breast radiation.  5. Subacute and acute appearing fracture deformities involving the right and left ribs as above. No pneumothorax.    A/P: 62 year old female PMHx COPD, Asthma, Breast Ca 2014, Lung nodule s/p RUL lobectomy 12/2016 with Dr. Foreman presented to the ED for shortness of breath following mechanical fall. Patient found to have left sided non displaced rib fractures on CXR without any evidence of pneumothorax.   - CT chest done yesterday, no pneumothorax.   - Pain improving, continue pain control as per primary team  - No CTS intervention at this time   - Continue medical management as per primary team   - Will continue to follow

## 2017-05-11 NOTE — PROGRESS NOTE ADULT - ATTENDING COMMENTS
Seen and examined by me this morning. Feels and looks better than yesterday, less labored breathing. Pain 7/10. No BM. VSS, Saturating >95% on NC. Lung exam improved from yesterday, minimal exp wheezes at bases. No edema of LE's. CT chest results reviewed, subacute/acute rib fractures, post surgical changes, R hemithorax volume loss, R pleural effusion. Scattered small and large airway inflammation. Partial atelectasis of the right middle lobe with narrowing of the right middle lobe bronchus. Etiology this is unclear and could represent a right middle lobe syndrome and/or the presence of mucous plugging. Noted CTS input. Pulm has been consulted and apprec recs. Will continue current regimen, patient is still desaturating to <88% with ambulation, c/w prednisone, nebs ATC, symbicort, enc ambulation and IS use. Repeat CXR from today, reviewed by me, seems to have improved, f/up official reading. C/w pain control with tramadol and bowel regimen. Anticipate dc home tomorrow, hopefully with no oxygen needs. Seen and examined by me this morning. Feels and looks better than yesterday, less labored breathing. Pain 7/10. No BM. VSS, Saturating >95% on NC. Lung exam improved from yesterday, minimal exp wheezes at bases. No edema of LE's. CT chest results reviewed, subacute/acute rib fractures, post surgical changes, R hemithorax volume loss, R pleural effusion. Scattered small and large airway inflammation. Partial atelectasis of the right middle lobe with narrowing of the right middle lobe bronchus. Etiology this is unclear and could represent a right middle lobe syndrome and/or the presence of mucous plugging. Noted CTS input. Pulm has been consulted and apprec recs. Will continue current regimen, patient is still desaturating to <88% with ambulation, c/w prednisone, nebs ATC, symbicort, enc ambulation and IS use. Repeat CXR from today, reviewed by me, seems to have improved, f/up official reading. TTE is essentially normal. C/w pain control with tramadol and bowel regimen. Anticipate dc home tomorrow, hopefully with no oxygen needs.

## 2017-05-11 NOTE — CONSULT NOTE ADULT - ATTENDING COMMENTS
Patient seen and examined with house-staff during bedside rounds.  Resident note read, including vitals, physical findings, laboratory data, and radiological reports.   Revisions included below.  Direct personal management at bed side and extensive interpretation of the data.  Plan was outlined and discussed in details with the housestaff.  Decision making of high complexity

## 2017-05-11 NOTE — CONSULT NOTE ADULT - PROBLEM SELECTOR RECOMMENDATION 9
Likely due to to atelectasis 2/2 to splinting from rib fracture. Currently not hypoxic on RA (S-97%), yesterday was noted to be desaturating with ambulation - likely also a component of COPD/post lobectomy.   - Pain control to allow for deep breathing  - IS  - OOB as possible Likely due to to atelectasis 2/2 to splinting from rib fracture. Currently not hypoxic on RA (S-97%), yesterday was noted to be desaturating with ambulation - likely also a component of COPD/post lobectomy.   CXR yesterday with volume loss on the R.  - Repeat CXR now - will see if improved with pain control and better pulmonary toilet  - Pain control to allow for deep breathing  - IS  - OOB as possible  - Please document O2 sat with ambulation

## 2017-05-11 NOTE — CONSULT NOTE ADULT - ASSESSMENT
63 yo F, PMH of COPD with mild obstruction, Asthma, allergies, presenting with dyspnea and rib fractures

## 2017-05-11 NOTE — PROGRESS NOTE ADULT - PROBLEM SELECTOR PLAN 4
H/o breast cancer with lumpectomy, lymph node biosy, chemo and radiation in 2014. Patient has been on Exemastane since and will need to continue it through 2019.  - Continue home Exemastane H/o breast cancer with lumpectomy, lymph node biopsy, chemo and radiation in 2014. Patient has been on Exemastane since and will need to continue it through 2019.  - Continue home Exemastane

## 2017-05-11 NOTE — CONSULT NOTE ADULT - PROBLEM SELECTOR RECOMMENDATION 3
RML volume loss - unclear if all atelectasis 2/2 to splinting and post surgical changes, or if there is also an element of mucus plugging.  Will follow up clinically now with interventions as above.  Will likely need a follow up CT as out pt

## 2017-05-11 NOTE — PROGRESS NOTE ADULT - PROBLEM SELECTOR PLAN 3
Patient with expiratory wheezes on exam today.  - Prednisone 40mg day #2  - Continue Advair, Duonebs Patient with expiratory wheezes on exam today.  - Prednisone 40mg day #2  - Continue Advair, Duonebs  - Will add Spiriva and Claritin for d/c

## 2017-05-11 NOTE — PROGRESS NOTE ADULT - PROBLEM SELECTOR PLAN 2
Likely 2/2 splinting from pain of broken ribs, likely with a component of COPD/asthma exacerbation.   CXR showing Further volume loss in the right hemithorax with suspected effusion and   evolving infiltrates. Findings may be due to atelectasis secondary to   endobronchial lesion.  - Obtain chest CT with IV contrast  - Continue Home Advair  - Start Duonebs q4 standing  - Continue NC

## 2017-05-11 NOTE — PROGRESS NOTE ADULT - PROBLEM SELECTOR PLAN 10
DVT ppx: SCDs  Replete electrolytes for a K of 4 and Mg of 2  DASH/TLC diet   Dispo: RMF, home when medically stable

## 2017-05-11 NOTE — PROGRESS NOTE ADULT - PROBLEM SELECTOR PLAN 1
S/p fall onto furniture resulting on fracture of multiple left sided rib fractures. Patient hypoxic and with likely atelectasis from splinting 2/2 pain. Chest CT showed an area of area of atelectasis in the RML, could be 2/2 mucus plugging or middle lobe syndrome.   - Continue with NC to maintain on O2 sat >92  - Pain control with Tramadol 50mg q6 and Morphine for break through pain  - Incentive spirometry  - S/p fall onto furniture resulting on fracture of multiple left sided rib fractures. Patient hypoxic and with likely atelectasis from splinting 2/2 pain. Chest CT showed an area of area of atelectasis in the RML, could be 2/2 mucus plugging or middle lobe syndrome, as well as acute and subacute left sided rib fractures.   - Continue with NC to maintain on O2 sat >92  - Pain control with Tramadol 50mg q6 and Morphine for break through pain  - Incentive spirometry  - Consult pulm  - Inform CT surgery of CT findings

## 2017-05-12 VITALS
TEMPERATURE: 98 F | DIASTOLIC BLOOD PRESSURE: 77 MMHG | OXYGEN SATURATION: 96 % | HEART RATE: 85 BPM | SYSTOLIC BLOOD PRESSURE: 130 MMHG | RESPIRATION RATE: 20 BRPM

## 2017-05-12 LAB
ANION GAP SERPL CALC-SCNC: 5 MMOL/L — LOW (ref 9–16)
BUN SERPL-MCNC: 8 MG/DL — SIGNIFICANT CHANGE UP (ref 7–23)
CALCIUM SERPL-MCNC: 9 MG/DL — SIGNIFICANT CHANGE UP (ref 8.5–10.5)
CHLORIDE SERPL-SCNC: 103 MMOL/L — SIGNIFICANT CHANGE UP (ref 96–108)
CO2 SERPL-SCNC: 28 MMOL/L — SIGNIFICANT CHANGE UP (ref 22–31)
CREAT SERPL-MCNC: 0.55 MG/DL — SIGNIFICANT CHANGE UP (ref 0.5–1.3)
GLUCOSE SERPL-MCNC: 144 MG/DL — HIGH (ref 70–99)
HCT VFR BLD CALC: 40.9 % — SIGNIFICANT CHANGE UP (ref 34.5–45)
HGB BLD-MCNC: 14.1 G/DL — SIGNIFICANT CHANGE UP (ref 11.5–15.5)
MAGNESIUM SERPL-MCNC: 1.7 MG/DL — SIGNIFICANT CHANGE UP (ref 1.6–2.6)
MCHC RBC-ENTMCNC: 34.5 G/DL — SIGNIFICANT CHANGE UP (ref 32–36)
MCHC RBC-ENTMCNC: 36.5 PG — HIGH (ref 27–34)
MCV RBC AUTO: 106 FL — HIGH (ref 80–100)
PLATELET # BLD AUTO: 260 K/UL — SIGNIFICANT CHANGE UP (ref 150–400)
POTASSIUM SERPL-MCNC: 3.9 MMOL/L — SIGNIFICANT CHANGE UP (ref 3.5–5.3)
POTASSIUM SERPL-SCNC: 3.9 MMOL/L — SIGNIFICANT CHANGE UP (ref 3.5–5.3)
RBC # BLD: 3.86 M/UL — SIGNIFICANT CHANGE UP (ref 3.8–5.2)
RBC # FLD: 14.2 % — SIGNIFICANT CHANGE UP (ref 10.3–16.9)
SODIUM SERPL-SCNC: 136 MMOL/L — SIGNIFICANT CHANGE UP (ref 135–145)
WBC # BLD: 12.9 K/UL — HIGH (ref 3.8–10.5)
WBC # FLD AUTO: 12.9 K/UL — HIGH (ref 3.8–10.5)

## 2017-05-12 PROCEDURE — 93005 ELECTROCARDIOGRAM TRACING: CPT

## 2017-05-12 PROCEDURE — 36415 COLL VENOUS BLD VENIPUNCTURE: CPT

## 2017-05-12 PROCEDURE — 99233 SBSQ HOSP IP/OBS HIGH 50: CPT | Mod: GC

## 2017-05-12 PROCEDURE — 80048 BASIC METABOLIC PNL TOTAL CA: CPT

## 2017-05-12 PROCEDURE — 83735 ASSAY OF MAGNESIUM: CPT

## 2017-05-12 PROCEDURE — 85025 COMPLETE CBC W/AUTO DIFF WBC: CPT

## 2017-05-12 PROCEDURE — 71045 X-RAY EXAM CHEST 1 VIEW: CPT

## 2017-05-12 PROCEDURE — 93306 TTE W/DOPPLER COMPLETE: CPT

## 2017-05-12 PROCEDURE — 83036 HEMOGLOBIN GLYCOSYLATED A1C: CPT

## 2017-05-12 PROCEDURE — 80053 COMPREHEN METABOLIC PANEL: CPT

## 2017-05-12 PROCEDURE — 71260 CT THORAX DX C+: CPT

## 2017-05-12 PROCEDURE — 85027 COMPLETE CBC AUTOMATED: CPT

## 2017-05-12 PROCEDURE — 86803 HEPATITIS C AB TEST: CPT

## 2017-05-12 PROCEDURE — 93970 EXTREMITY STUDY: CPT

## 2017-05-12 PROCEDURE — 94640 AIRWAY INHALATION TREATMENT: CPT

## 2017-05-12 PROCEDURE — 82607 VITAMIN B-12: CPT

## 2017-05-12 PROCEDURE — 87389 HIV-1 AG W/HIV-1&-2 AB AG IA: CPT

## 2017-05-12 PROCEDURE — 99239 HOSP IP/OBS DSCHRG MGMT >30: CPT

## 2017-05-12 PROCEDURE — 99285 EMERGENCY DEPT VISIT HI MDM: CPT | Mod: 25

## 2017-05-12 PROCEDURE — 83921 ORGANIC ACID SINGLE QUANT: CPT

## 2017-05-12 RX ORDER — MAGNESIUM SULFATE 500 MG/ML
2 VIAL (ML) INJECTION ONCE
Qty: 0 | Refills: 0 | Status: COMPLETED | OUTPATIENT
Start: 2017-05-12 | End: 2017-05-12

## 2017-05-12 RX ORDER — POTASSIUM CHLORIDE 20 MEQ
10 PACKET (EA) ORAL ONCE
Qty: 0 | Refills: 0 | Status: COMPLETED | OUTPATIENT
Start: 2017-05-12 | End: 2017-05-12

## 2017-05-12 RX ORDER — TRAMADOL HYDROCHLORIDE 50 MG/1
1 TABLET ORAL
Qty: 0 | Refills: 0 | COMMUNITY
Start: 2017-05-12

## 2017-05-12 RX ORDER — SENNA PLUS 8.6 MG/1
2 TABLET ORAL
Qty: 14 | Refills: 0 | OUTPATIENT
Start: 2017-05-12 | End: 2017-05-19

## 2017-05-12 RX ORDER — OXYCODONE HYDROCHLORIDE 5 MG/1
1 TABLET ORAL
Qty: 28 | Refills: 0 | OUTPATIENT
Start: 2017-05-12 | End: 2017-05-19

## 2017-05-12 RX ORDER — TIOTROPIUM BROMIDE 18 UG/1
1 CAPSULE ORAL; RESPIRATORY (INHALATION)
Qty: 1 | Refills: 0 | OUTPATIENT
Start: 2017-05-12 | End: 2017-06-11

## 2017-05-12 RX ORDER — ALBUTEROL 90 UG/1
2 AEROSOL, METERED ORAL
Qty: 1 | Refills: 0 | OUTPATIENT
Start: 2017-05-12 | End: 2017-06-11

## 2017-05-12 RX ORDER — LORATADINE 10 MG/1
1 TABLET ORAL
Qty: 30 | Refills: 0 | OUTPATIENT
Start: 2017-05-12 | End: 2017-06-11

## 2017-05-12 RX ADMIN — TRAMADOL HYDROCHLORIDE 50 MILLIGRAM(S): 50 TABLET ORAL at 04:13

## 2017-05-12 RX ADMIN — Medication 40 MILLIGRAM(S): at 05:26

## 2017-05-12 RX ADMIN — Medication 1000 UNIT(S): at 11:28

## 2017-05-12 RX ADMIN — TRAMADOL HYDROCHLORIDE 50 MILLIGRAM(S): 50 TABLET ORAL at 14:25

## 2017-05-12 RX ADMIN — Medication 100 MILLIGRAM(S): at 05:25

## 2017-05-12 RX ADMIN — Medication 3 MILLILITER(S): at 13:24

## 2017-05-12 RX ADMIN — Medication 100 MILLIGRAM(S): at 11:27

## 2017-05-12 RX ADMIN — Medication 4: at 13:24

## 2017-05-12 RX ADMIN — BUDESONIDE AND FORMOTEROL FUMARATE DIHYDRATE 2 PUFF(S): 160; 4.5 AEROSOL RESPIRATORY (INHALATION) at 05:27

## 2017-05-12 RX ADMIN — Medication 1 TABLET(S): at 11:27

## 2017-05-12 RX ADMIN — Medication 3 MILLILITER(S): at 08:59

## 2017-05-12 RX ADMIN — Medication 50 GRAM(S): at 10:27

## 2017-05-12 RX ADMIN — AMLODIPINE BESYLATE 10 MILLIGRAM(S): 2.5 TABLET ORAL at 05:25

## 2017-05-12 RX ADMIN — Medication 1 TABLET(S): at 11:26

## 2017-05-12 RX ADMIN — TRAMADOL HYDROCHLORIDE 50 MILLIGRAM(S): 50 TABLET ORAL at 05:13

## 2017-05-12 RX ADMIN — PANTOPRAZOLE SODIUM 40 MILLIGRAM(S): 20 TABLET, DELAYED RELEASE ORAL at 06:44

## 2017-05-12 RX ADMIN — TRAMADOL HYDROCHLORIDE 50 MILLIGRAM(S): 50 TABLET ORAL at 13:25

## 2017-05-12 RX ADMIN — GABAPENTIN 300 MILLIGRAM(S): 400 CAPSULE ORAL at 05:25

## 2017-05-12 RX ADMIN — Medication 3 MILLILITER(S): at 04:13

## 2017-05-12 RX ADMIN — Medication 10 MILLIEQUIVALENT(S): at 10:26

## 2017-05-12 RX ADMIN — TIOTROPIUM BROMIDE 1 CAPSULE(S): 18 CAPSULE ORAL; RESPIRATORY (INHALATION) at 11:31

## 2017-05-12 RX ADMIN — Medication 1 MILLIGRAM(S): at 11:26

## 2017-05-12 RX ADMIN — LORATADINE 10 MILLIGRAM(S): 10 TABLET ORAL at 11:27

## 2017-05-12 RX ADMIN — GABAPENTIN 300 MILLIGRAM(S): 400 CAPSULE ORAL at 13:24

## 2017-05-12 RX ADMIN — EXEMESTANE 25 MILLIGRAM(S): 25 TABLET, SUGAR COATED ORAL at 13:28

## 2017-05-12 NOTE — PROGRESS NOTE ADULT - PROBLEM SELECTOR PLAN 3
Patient with expiratory wheezes on exam today.  - Prednisone 40mg day #2  - Continue Advair, Duonebs  - Continue Spiriva and Claritin **Patient desaturated to 86% while ambulating on RA today, will need home O2 .  - Prednisone 40mg day #2  - Continue AdvairYolande  - Continue Spiriva and Claritin **Patient desaturated to 86% while ambulating on RA today, will need home O2 .  - Prednisone 40mg day #3  - Continue AdvairYolande  - Continue Spiriva and Claritin

## 2017-05-12 NOTE — PROGRESS NOTE ADULT - ASSESSMENT
62F PMHx of COPD, asthma, RUL lobectomy in december 2016 for a lung nodule, and breast cancer in 2014, admitted for hypoxia in the setting of rib fracture, now with mild COPD exacerbation.

## 2017-05-12 NOTE — PROGRESS NOTE ADULT - PROBLEM SELECTOR PROBLEM 4
Closed fracture of multiple ribs of both sides, initial encounter
Breast cancer

## 2017-05-12 NOTE — PROGRESS NOTE ADULT - PROBLEM SELECTOR PLAN 2
mild obstruction on PFTs in december. Now post RUL lobectomy. Probable Asthma/COPD overlap syndrome.  At this point does not seem to be a COPD exacerbation, however given the frequent use of her rescue inhaler - not on optimized medical therapy. Allergies might also contribute to her sx.  - c/w home Advair  - Recommend adding Spiriva  - Recommend adding Claritin/Zyrtec  - Will need out pt follow up including PFTs once acute episode resolves to establish new baseline post procedure.  - Will follow up with Dr Stan zapatapt - 5/23/17 at 1:40pm.

## 2017-05-12 NOTE — PROGRESS NOTE ADULT - ATTENDING COMMENTS
Patient seen and examined with house-staff during bedside rounds.  Resident note read, including vitals, physical findings, laboratory data, and radiological reports.   Revisions included below.  Direct personal management at bed side and extensive interpretation of the data.  Plan was outlined and discussed in details with the housestaff.  Decision making of high complexity  continue current inhaler follow in 2 weeks

## 2017-05-12 NOTE — PROGRESS NOTE ADULT - PROBLEM SELECTOR PLAN 10
DVT ppx: SCDs  Replete electrolytes for a K of 4 and Mg of 2  DASH/TLC diet   Dispo: RMF, home when medically stable - likely today with home O2

## 2017-05-12 NOTE — PROGRESS NOTE ADULT - PROBLEM SELECTOR PLAN 4
H/o breast cancer with lumpectomy, lymph node biopsy, chemo and radiation in 2014. Patient has been on Exemastane since and will need to continue it through 2019.  - Continue home Exemastane

## 2017-05-12 NOTE — PROGRESS NOTE ADULT - PROBLEM SELECTOR PLAN 1
Much improved, likely due to to atelectasis 2/2 to splinting from rib fracture. likely also a component of COPD/post lobectomy.   CXR yesterday with volume loss on the R, however improved since admission  - Pain control to allow for deep breathing  - IS  - OOB as possible  - Please document O2 sat with ambulation prior to d/c

## 2017-05-12 NOTE — PROGRESS NOTE ADULT - PROBLEM SELECTOR PLAN 1
S/p fall onto furniture resulting on fracture of multiple left sided rib fractures. Patient hypoxic and with likely atelectasis from splinting 2/2 pain. Chest CT showed an area of area of atelectasis in the RML, could be 2/2 mucus plugging or middle lobe syndrome, as well as acute and subacute left sided rib fractures.   - Continue with NC to maintain on O2 sat >92  - Pain control with Tramadol 50mg q6 and Percocet for break through pain  - Incentive spirometry  - Pulm feels no bronchoscopy is necessary - patient will follow up with Dr Pierce outpatient

## 2017-05-12 NOTE — CHART NOTE - NSCHARTNOTEFT_GEN_A_CORE
Discussed with Dr. Pierre, no further intervention from our standpoint, we will sign off at this time.  Re-consult as needed.

## 2017-05-12 NOTE — PROGRESS NOTE ADULT - PROBLEM SELECTOR PROBLEM 3
Abnormal CT scan of lung
Chronic obstructive pulmonary disease, unspecified COPD type

## 2017-05-12 NOTE — PROGRESS NOTE ADULT - PROBLEM SELECTOR PLAN 3
RML volume loss - unclear if all atelectasis 2/2 to splinting and post surgical changes, or if there is also an element of mucus plugging.  Will follow up clinically now with interventions as above.  Will likely need a follow up CT as out pt.

## 2017-05-12 NOTE — PROGRESS NOTE ADULT - SUBJECTIVE AND OBJECTIVE BOX
INTERVAL HPI/OVERNIGHT EVENTS:    VITAL SIGNS:  T(F): 97.9  HR: 81  BP: 164/89  RR: 22  SpO2: 90%  Wt(kg): --    PHYSICAL EXAM:    Constitutional:  Eyes:  ENMT:  Neck:  Respiratory:  Cardiovascular:  Gastrointestinal:  Extremities:  Vascular:  Neurological:  Musculoskeletal:    MEDICATIONS  (STANDING):  gabapentin 300milliGRAM(s) Oral three times a day  atorvastatin 20milliGRAM(s) Oral at bedtime  metoprolol succinate ER 100milliGRAM(s) Oral daily  amLODIPine   Tablet 10milliGRAM(s) Oral daily  pantoprazole    Tablet 40milliGRAM(s) Oral before breakfast  cholecalciferol 1000Unit(s) Oral daily  buDESOnide 160 MICROgram(s)/formoterol 4.5 MICROgram(s) Inhaler 2Puff(s) Inhalation two times a day  multivitamin 1Tablet(s) Oral daily  exemestane 25milliGRAM(s) Oral after lunch  thiamine 100milliGRAM(s) Oral daily  folic acid 1milliGRAM(s) Oral daily  calcium carbonate 1250 mG (OsCal) 1Tablet(s) Oral daily  predniSONE   Tablet 40milliGRAM(s) Oral daily  ALBUTerol/ipratropium for Nebulization 3milliLiter(s) Nebulizer every 4 hours  bisacodyl 5milliGRAM(s) Oral at bedtime  insulin lispro (HumaLOG) corrective regimen sliding scale  SubCutaneous Before meals and at bedtime  dextrose 5%. 1000milliLiter(s) IV Continuous <Continuous>  dextrose 50% Injectable 12.5Gram(s) IV Push once  dextrose 50% Injectable 25Gram(s) IV Push once  dextrose 50% Injectable 25Gram(s) IV Push once  loratadine 10milliGRAM(s) Oral daily  senna 2Tablet(s) Oral at bedtime  docusate sodium 100milliGRAM(s) Oral two times a day  tiotropium 18 MICROgram(s) Capsule 1Capsule(s) Inhalation daily    MEDICATIONS  (PRN):  acetaminophen   Tablet. 650milliGRAM(s) Oral every 6 hours PRN Mild Pain (1 - 3)  LORazepam   Injectable 2milliGRAM(s) IV Push every 6 hours PRN Anxiety  dextrose Gel 1Dose(s) Oral once PRN Blood Glucose LESS THAN 70 milliGRAM(s)/deciliter  glucagon  Injectable 1milliGRAM(s) IntraMuscular once PRN Glucose LESS THAN 70 milligrams/deciliter  traMADol 50milliGRAM(s) Oral every 6 hours PRN Severe Pain (7 - 10)      Allergies    No Known Allergies    Intolerances        LABS:                        14.5   9.2   )-----------( 264      ( 11 May 2017 07:16 )             41.0     05-11    138  |  102  |  6<L>  ----------------------------<  108<H>  3.9   |  26  |  0.53    Ca    9.3      11 May 2017 07:14  Mg     2.1     05-11            RADIOLOGY & ADDITIONAL TESTS: INTERVAL HPI/OVERNIGHT EVENTS:  Patient refused sliding scale coverage for a blood sugar of 198 overnight.   Patient reports her left sided pain is an 8/10 this morning. Still very short of breath when she walks to the bathroom.    VITAL SIGNS:  T(F): 97.9  HR: 81  BP: 164/89  RR: 22  SpO2: 90%  Wt(kg): --    PHYSICAL EXAM:  Constitutional: WDWN resting comfortably in bed; NAD  Head: NC/AT  Eyes: PERRL  ENT: tongue asymmetrical - superior on the left, moist mucus membranes, Mallampati 2    Neck: supple; no JVD or thyromegaly  Respiratory: CTAB, no accessory muscle use    Cardiac: +S1/S2; RRR; no M/R/G  Gastrointestinal: soft, NT/ND; no rebound or guarding; +BS  Back: spine midline, no bony tenderness or step-offs  Extremities: WWP, no clubbing or cyanosis; no edema   Musculoskeletal: NROM x4; no joint swelling, tenderness or erythema  Vascular: 2+ DP pulses B/L  Dermatologic: skin warm, dry and intact  Lymphatic: no submandibular or cervical LAD  Neurologic: AAOx3; CNII-XII grossly intact;  sensory deficit in the right great toe  Psychiatric: affect and characteristics of appearance, verbalizations, behaviors are appropriate    MEDICATIONS  (STANDING):  gabapentin 300milliGRAM(s) Oral three times a day  atorvastatin 20milliGRAM(s) Oral at bedtime  metoprolol succinate ER 100milliGRAM(s) Oral daily  amLODIPine   Tablet 10milliGRAM(s) Oral daily  pantoprazole    Tablet 40milliGRAM(s) Oral before breakfast  cholecalciferol 1000Unit(s) Oral daily  buDESOnide 160 MICROgram(s)/formoterol 4.5 MICROgram(s) Inhaler 2Puff(s) Inhalation two times a day  multivitamin 1Tablet(s) Oral daily  exemestane 25milliGRAM(s) Oral after lunch  thiamine 100milliGRAM(s) Oral daily  folic acid 1milliGRAM(s) Oral daily  calcium carbonate 1250 mG (OsCal) 1Tablet(s) Oral daily  predniSONE   Tablet 40milliGRAM(s) Oral daily  ALBUTerol/ipratropium for Nebulization 3milliLiter(s) Nebulizer every 4 hours  bisacodyl 5milliGRAM(s) Oral at bedtime  insulin lispro (HumaLOG) corrective regimen sliding scale  SubCutaneous Before meals and at bedtime  dextrose 5%. 1000milliLiter(s) IV Continuous <Continuous>  dextrose 50% Injectable 12.5Gram(s) IV Push once  dextrose 50% Injectable 25Gram(s) IV Push once  dextrose 50% Injectable 25Gram(s) IV Push once  loratadine 10milliGRAM(s) Oral daily  senna 2Tablet(s) Oral at bedtime  docusate sodium 100milliGRAM(s) Oral two times a day  tiotropium 18 MICROgram(s) Capsule 1Capsule(s) Inhalation daily    MEDICATIONS  (PRN):  acetaminophen   Tablet. 650milliGRAM(s) Oral every 6 hours PRN Mild Pain (1 - 3)  LORazepam   Injectable 2milliGRAM(s) IV Push every 6 hours PRN Anxiety  dextrose Gel 1Dose(s) Oral once PRN Blood Glucose LESS THAN 70 milliGRAM(s)/deciliter  glucagon  Injectable 1milliGRAM(s) IntraMuscular once PRN Glucose LESS THAN 70 milligrams/deciliter  traMADol 50milliGRAM(s) Oral every 6 hours PRN Severe Pain (7 - 10)      Allergies    No Known Allergies    Intolerances        LABS:                        14.5   9.2   )-----------( 264      ( 11 May 2017 07:16 )             41.0     05-11    138  |  102  |  6<L>  ----------------------------<  108<H>  3.9   |  26  |  0.53    Ca    9.3      11 May 2017 07:14  Mg     2.1     05-11            RADIOLOGY & ADDITIONAL TESTS:

## 2017-05-12 NOTE — PROGRESS NOTE ADULT - SUBJECTIVE AND OBJECTIVE BOX
Interval Events:  Patient seen and examined at bedside.    MEDICATIONS:  Pulmonary:  buDESOnide 160 MICROgram(s)/formoterol 4.5 MICROgram(s) Inhaler 2Puff(s) Inhalation two times a day  ALBUTerol/ipratropium for Nebulization 3milliLiter(s) Nebulizer every 4 hours  loratadine 10milliGRAM(s) Oral daily  tiotropium 18 MICROgram(s) Capsule 1Capsule(s) Inhalation daily    Antimicrobials:    Anticoagulants:    Cardiac:  metoprolol succinate ER 100milliGRAM(s) Oral daily  amLODIPine   Tablet 10milliGRAM(s) Oral daily      Allergies    No Known Allergies    Intolerances        Vital Signs Last 24 Hrs  T(C): 36.6, Max: 37 (05-11 @ 18:12)  T(F): 97.8, Max: 98.6 (05-11 @ 18:12)  HR: 85 (81 - 85)  BP: 130/77 (119/70 - 164/89)  BP(mean): --  RR: 20 (18 - 22)  SpO2: 96% (90% - 96%)    I & Os for current day (as of 05-12 @ 15:18)  =============================================  IN: 0 ml / OUT: 3 ml / NET: -3 ml        PHYSICAL EXAM:  General: Well developed; well nourished; in no acute distress  HEENT: PERRL, EOMI, non icteric  Neck: Supple; no masses  Respiratory: Clear to auscultation bilaterally, nowheezing or crackles  Cardiovascular: Regular rate and rhythm. S1 and S2 Normal; No murmurs  Gastrointestinal: Soft non-tender non-distended; Normal bowel sounds  Extremities:WWP, no edema, no cyanosis  Neurological: Alert and oriented x3  Skin: Warm and dry. No obvious rash    LABS:      CBC Full  -  ( 12 May 2017 08:29 )  WBC Count : 12.9 K/uL  Hemoglobin : 14.1 g/dL  Hematocrit : 40.9 %  Platelet Count - Automated : 260 K/uL  Mean Cell Volume : 106.0 fL  Mean Cell Hemoglobin : 36.5 pg  Mean Cell Hemoglobin Concentration : 34.5 g/dL  Auto Neutrophil # : x  Auto Lymphocyte # : x  Auto Monocyte # : x  Auto Eosinophil # : x  Auto Basophil # : x  Auto Neutrophil % : x  Auto Lymphocyte % : x  Auto Monocyte % : x  Auto Eosinophil % : x  Auto Basophil % : x    05-12    136  |  103  |  8   ----------------------------<  144<H>  3.9   |  28  |  0.55    Ca    9.0      12 May 2017 08:30  Mg     1.7     05-12                        RADIOLOGY imaging reviewed Interval Events:  Patient seen and examined at bedside. Feels better, breathing improved, ambulating more, less pain.    MEDICATIONS:  Pulmonary:  buDESOnide 160 MICROgram(s)/formoterol 4.5 MICROgram(s) Inhaler 2Puff(s) Inhalation two times a day  ALBUTerol/ipratropium for Nebulization 3milliLiter(s) Nebulizer every 4 hours  loratadine 10milliGRAM(s) Oral daily  tiotropium 18 MICROgram(s) Capsule 1Capsule(s) Inhalation daily    Antimicrobials:    Anticoagulants:    Cardiac:  metoprolol succinate ER 100milliGRAM(s) Oral daily  amLODIPine   Tablet 10milliGRAM(s) Oral daily      Allergies    No Known Allergies    Intolerances        Vital Signs Last 24 Hrs  T(C): 36.6, Max: 37 (05-11 @ 18:12)  T(F): 97.8, Max: 98.6 (05-11 @ 18:12)  HR: 85 (81 - 85)  BP: 130/77 (119/70 - 164/89)  BP(mean): --  RR: 20 (18 - 22)  SpO2: 96% (90% - 96%)    I & Os for current day (as of 05-12 @ 15:18)  =============================================  IN: 0 ml / OUT: 3 ml / NET: -3 ml        PHYSICAL EXAM:  General: Well developed; well nourished; in no acute distress  HEENT: PERRL, EOMI, non icteric  Neck: Supple;   Respiratory: decreased BS b/l, mild scattered wheezing b/l  Cardiovascular: Regular rate and rhythm. S1 and S2 Normal;   Gastrointestinal: Soft non-tender non-distended;   Extremities: WWP, no edema, no cyanosis  Neurological: Alert and oriented x3  Skin: Warm and dry. No obvious rash    LABS:      CBC Full  -  ( 12 May 2017 08:29 )  WBC Count : 12.9 K/uL  Hemoglobin : 14.1 g/dL  Hematocrit : 40.9 %  Platelet Count - Automated : 260 K/uL  Mean Cell Volume : 106.0 fL  Mean Cell Hemoglobin : 36.5 pg  Mean Cell Hemoglobin Concentration : 34.5 g/dL  Auto Neutrophil # : x  Auto Lymphocyte # : x  Auto Monocyte # : x  Auto Eosinophil # : x  Auto Basophil # : x  Auto Neutrophil % : x  Auto Lymphocyte % : x  Auto Monocyte % : x  Auto Eosinophil % : x  Auto Basophil % : x    05-12    136  |  103  |  8   ----------------------------<  144<H>  3.9   |  28  |  0.55    Ca    9.0      12 May 2017 08:30  Mg     1.7     05-12                        RADIOLOGY imaging reviewed

## 2017-05-12 NOTE — PROGRESS NOTE ADULT - PROBLEM SELECTOR PLAN 2
Likely 2/2 splinting from pain of broken ribs, likely with a component of COPD/asthma exacerbation.   CXR showing Further volume loss in the right hemithorax with suspected effusion and   evolving infiltrates. Findings may be due to atelectasis secondary to   endobronchial lesion.  - Continue Home Advair  - Continue Duonebs q4 standing  - Continue NC Likely 2/2 splinting from pain of broken ribs, likely with a component of COPD/asthma exacerbation.   **Patient desaturated to 86% while ambulating on RA today, will need home O2   - Continue Home Advair  - Continue Duonebs q4 standing  - Continue NC

## 2017-05-15 DIAGNOSIS — G62.0 DRUG-INDUCED POLYNEUROPATHY: ICD-10-CM

## 2017-05-15 DIAGNOSIS — R07.1 CHEST PAIN ON BREATHING: ICD-10-CM

## 2017-05-15 DIAGNOSIS — J98.11 ATELECTASIS: ICD-10-CM

## 2017-05-15 DIAGNOSIS — Z87.891 PERSONAL HISTORY OF NICOTINE DEPENDENCE: ICD-10-CM
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
## 2017-05-15 DIAGNOSIS — R09.02 HYPOXEMIA: ICD-10-CM
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
## 2017-05-15 DIAGNOSIS — Z85.810 PERSONAL HISTORY OF MALIGNANT NEOPLASM OF TONGUE: ICD-10-CM
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
## 2017-05-15 DIAGNOSIS — E78.00 PURE HYPERCHOLESTEROLEMIA, UNSPECIFIED: ICD-10-CM

## 2017-05-15 DIAGNOSIS — I16.0 HYPERTENSIVE URGENCY: ICD-10-CM

## 2017-05-15 DIAGNOSIS — T45.1X5A ADVERSE EFFECT OF ANTINEOPLASTIC AND IMMUNOSUPPRESSIVE DRUGS, INITIAL ENCOUNTER: ICD-10-CM

## 2017-05-15 DIAGNOSIS — G62.2 POLYNEUROPATHY DUE TO OTHER TOXIC AGENTS: ICD-10-CM

## 2017-05-15 DIAGNOSIS — J44.1 CHRONIC OBSTRUCTIVE PULMONARY DISEASE WITH (ACUTE) EXACERBATION: ICD-10-CM

## 2017-05-15 DIAGNOSIS — Z90.2 ACQUIRED ABSENCE OF LUNG [PART OF]: ICD-10-CM

## 2017-05-15 DIAGNOSIS — Z85.118 PERSONAL HISTORY OF OTHER MALIGNANT NEOPLASM OF BRONCHUS AND LUNG: ICD-10-CM

## 2017-05-15 DIAGNOSIS — Z85.3 PERSONAL HISTORY OF MALIGNANT NEOPLASM OF BREAST: ICD-10-CM

## 2017-05-15 DIAGNOSIS — S22.42XA MULTIPLE FRACTURES OF RIBS, LEFT SIDE, INITIAL ENCOUNTER FOR CLOSED FRACTURE: ICD-10-CM

## 2017-05-15 DIAGNOSIS — J98.19 OTHER PULMONARY COLLAPSE: ICD-10-CM

## 2017-05-15 DIAGNOSIS — T17.890A OTHER FOREIGN OBJECT IN OTHER PARTS OF RESPIRATORY TRACT CAUSING ASPHYXIATION, INITIAL ENCOUNTER: ICD-10-CM

## 2017-05-15 DIAGNOSIS — Z92.3 PERSONAL HISTORY OF IRRADIATION: ICD-10-CM

## 2017-05-15 DIAGNOSIS — Z79.51 LONG TERM (CURRENT) USE OF INHALED STEROIDS: ICD-10-CM
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
## 2017-05-16 LAB — METHYLMALONATE SERPL-SCNC: 237 NMOL/L — SIGNIFICANT CHANGE UP (ref 0–378)

## 2017-05-17 NOTE — PATIENT PROFILE ADULT. - TEACHING/LEARNING FACTORS IMPACT ABILITY TO LEARN
Treatment Plan:    Start Neurontin (gabapentin) 300 mg by mouth daily at bedtime for 5 days, then increase to 600 mg by mouth daily at bedtime. For sleep, anxiety, mood, anger, pain.     Continue all other medications as reviewed per electronic medical record today.     All questions addressed. Education and counseling completed regarding risks and benefits of medications and psychotherapy options.    Safety plan was reviewed. To the Emergency Department as needed or call after hours crisis line at [phone] or [phone].     To schedule individual or family therapy, call [facility] at [phone].     Schedule an appointment with me in 4-6 weeks or sooner as needed.  Call [facility] at [phone] to schedule.    Follow up with primary care provider as planned or for acute medical concerns.    Call the psychiatric nurse line with medication questions or concerns at [phone].    My Practice Policy was reviewed and signed: YES     MyChart may be used to communicate with your provider, but this is not intended to be used for emergencies.  
normal...
none

## 2017-05-23 ENCOUNTER — OUTPATIENT (OUTPATIENT)
Dept: OUTPATIENT SERVICES | Facility: HOSPITAL | Age: 62
LOS: 1 days | End: 2017-05-23
Payer: COMMERCIAL

## 2017-05-23 ENCOUNTER — APPOINTMENT (OUTPATIENT)
Dept: PULMONOLOGY | Facility: CLINIC | Age: 62
End: 2017-05-23

## 2017-05-23 DIAGNOSIS — Z41.9 ENCOUNTER FOR PROCEDURE FOR PURPOSES OTHER THAN REMEDYING HEALTH STATE, UNSPECIFIED: Chronic | ICD-10-CM

## 2017-05-23 DIAGNOSIS — Z98.89 OTHER SPECIFIED POSTPROCEDURAL STATES: Chronic | ICD-10-CM

## 2017-05-23 DIAGNOSIS — Z98.890 OTHER SPECIFIED POSTPROCEDURAL STATES: Chronic | ICD-10-CM

## 2017-05-23 PROCEDURE — 71020: CPT | Mod: 26

## 2017-05-23 PROCEDURE — 71046 X-RAY EXAM CHEST 2 VIEWS: CPT

## 2017-06-05 ENCOUNTER — APPOINTMENT (OUTPATIENT)
Dept: OTOLARYNGOLOGY | Facility: CLINIC | Age: 62
End: 2017-06-05

## 2017-06-05 VITALS
TEMPERATURE: 97.5 F | HEART RATE: 106 BPM | BODY MASS INDEX: 27.7 KG/M2 | SYSTOLIC BLOOD PRESSURE: 123 MMHG | DIASTOLIC BLOOD PRESSURE: 79 MMHG | WEIGHT: 187 LBS | HEIGHT: 69 IN

## 2017-06-13 ENCOUNTER — FORM ENCOUNTER (OUTPATIENT)
Age: 62
End: 2017-06-13

## 2017-06-14 ENCOUNTER — OUTPATIENT (OUTPATIENT)
Dept: OUTPATIENT SERVICES | Facility: HOSPITAL | Age: 62
LOS: 1 days | End: 2017-06-14
Payer: COMMERCIAL

## 2017-06-14 DIAGNOSIS — Z41.9 ENCOUNTER FOR PROCEDURE FOR PURPOSES OTHER THAN REMEDYING HEALTH STATE, UNSPECIFIED: Chronic | ICD-10-CM

## 2017-06-14 DIAGNOSIS — Z98.89 OTHER SPECIFIED POSTPROCEDURAL STATES: Chronic | ICD-10-CM

## 2017-06-14 DIAGNOSIS — Z98.890 OTHER SPECIFIED POSTPROCEDURAL STATES: Chronic | ICD-10-CM

## 2017-06-14 PROCEDURE — 94760 N-INVAS EAR/PLS OXIMETRY 1: CPT

## 2017-06-14 PROCEDURE — 94726 PLETHYSMOGRAPHY LUNG VOLUMES: CPT | Mod: 26

## 2017-06-14 PROCEDURE — 94060 EVALUATION OF WHEEZING: CPT

## 2017-06-14 PROCEDURE — 94726 PLETHYSMOGRAPHY LUNG VOLUMES: CPT

## 2017-06-14 PROCEDURE — A9552: CPT

## 2017-06-14 PROCEDURE — 78815 PET IMAGE W/CT SKULL-THIGH: CPT

## 2017-06-14 PROCEDURE — 94729 DIFFUSING CAPACITY: CPT | Mod: 26

## 2017-06-14 PROCEDURE — 94729 DIFFUSING CAPACITY: CPT

## 2017-06-14 PROCEDURE — 94010 BREATHING CAPACITY TEST: CPT | Mod: 26

## 2017-06-14 PROCEDURE — 78815 PET IMAGE W/CT SKULL-THIGH: CPT | Mod: 26

## 2017-06-15 DIAGNOSIS — C34.91 MALIGNANT NEOPLASM OF UNSPECIFIED PART OF RIGHT BRONCHUS OR LUNG: ICD-10-CM

## 2017-06-15 DIAGNOSIS — J44.9 CHRONIC OBSTRUCTIVE PULMONARY DISEASE, UNSPECIFIED: ICD-10-CM

## 2017-06-15 DIAGNOSIS — C34.90 MALIGNANT NEOPLASM OF UNSPECIFIED PART OF UNSPECIFIED BRONCHUS OR LUNG: ICD-10-CM

## 2017-07-18 ENCOUNTER — APPOINTMENT (OUTPATIENT)
Dept: PULMONOLOGY | Facility: CLINIC | Age: 62
End: 2017-07-18

## 2017-07-23 ENCOUNTER — LABORATORY RESULT (OUTPATIENT)
Age: 62
End: 2017-07-23

## 2017-07-23 ENCOUNTER — OTHER (OUTPATIENT)
Age: 62
End: 2017-07-23

## 2017-08-15 ENCOUNTER — APPOINTMENT (OUTPATIENT)
Dept: PULMONOLOGY | Facility: CLINIC | Age: 62
End: 2017-08-15
Payer: COMMERCIAL

## 2017-08-15 PROCEDURE — 99214 OFFICE O/P EST MOD 30 MIN: CPT | Mod: 25

## 2017-08-16 LAB
25(OH)D3 SERPL-MCNC: 69.9 NG/ML
ALBUMIN SERPL ELPH-MCNC: 4 G/DL
ALP BLD-CCNC: 114 U/L
ALT SERPL-CCNC: 20 U/L
ANION GAP SERPL CALC-SCNC: 16 MMOL/L
APPEARANCE: CLEAR
AST SERPL-CCNC: 30 U/L
BACTERIA: NEGATIVE
BASOPHILS # BLD AUTO: 0.2 K/UL
BASOPHILS NFR BLD AUTO: 2.6 %
BILIRUB SERPL-MCNC: 1.1 MG/DL
BILIRUBIN URINE: NEGATIVE
BLOOD URINE: NEGATIVE
BUN SERPL-MCNC: 8 MG/DL
CALCIUM SERPL-MCNC: 10.2 MG/DL
CHLORIDE SERPL-SCNC: 101 MMOL/L
CHOLEST SERPL-MCNC: 165 MG/DL
CHOLEST/HDLC SERPL: 2.1 RATIO
CO2 SERPL-SCNC: 25 MMOL/L
COLOR: YELLOW
CREAT SERPL-MCNC: 1 MG/DL
CREAT SPEC-SCNC: 84 MG/DL
EOSINOPHIL # BLD AUTO: 0.39 K/UL
EOSINOPHIL NFR BLD AUTO: 5.2 %
GLUCOSE QUALITATIVE U: NORMAL MG/DL
GLUCOSE SERPL-MCNC: 127 MG/DL
HBA1C MFR BLD HPLC: 5.9 %
HCT VFR BLD CALC: 47.5 %
HDLC SERPL-MCNC: 80 MG/DL
HGB BLD-MCNC: 15.7 G/DL
HYALINE CASTS: 2 /LPF
KETONES URINE: NEGATIVE
LDLC SERPL CALC-MCNC: 70 MG/DL
LEUKOCYTE ESTERASE URINE: NEGATIVE
LYMPHOCYTES # BLD AUTO: 0.98 K/UL
LYMPHOCYTES NFR BLD AUTO: 13 %
MAGNESIUM SERPL-MCNC: 1.8 MG/DL
MAN DIFF?: NORMAL
MCHC RBC-ENTMCNC: 33.1 GM/DL
MCHC RBC-ENTMCNC: 35 PG
MCV RBC AUTO: 106 FL
MICROALBUMIN 24H UR DL<=1MG/L-MCNC: 0.4 MG/DL
MICROALBUMIN/CREAT 24H UR-RTO: 5 MG/G
MICROSCOPIC-UA: NORMAL
MONOCYTES # BLD AUTO: 0.39 K/UL
MONOCYTES NFR BLD AUTO: 5.2 %
NEUTROPHILS # BLD AUTO: 5.13 K/UL
NEUTROPHILS NFR BLD AUTO: 67.9 %
NITRITE URINE: NEGATIVE
PH URINE: 6
PHOSPHATE SERPL-MCNC: 4.5 MG/DL
PLATELET # BLD AUTO: 274 K/UL
POTASSIUM SERPL-SCNC: 4.9 MMOL/L
PROT SERPL-MCNC: 7.8 G/DL
PROTEIN URINE: NEGATIVE MG/DL
RBC # BLD: 4.48 M/UL
RBC # FLD: 14.3 %
RED BLOOD CELLS URINE: 5 /HPF
SODIUM SERPL-SCNC: 142 MMOL/L
SPECIFIC GRAVITY URINE: 1.01
SQUAMOUS EPITHELIAL CELLS: 1 /HPF
TRIGL SERPL-MCNC: 77 MG/DL
TSH SERPL-ACNC: 0.53 UIU/ML
URATE SERPL-MCNC: 6.6 MG/DL
UROBILINOGEN URINE: NORMAL MG/DL
WBC # FLD AUTO: 7.56 K/UL
WHITE BLOOD CELLS URINE: 4 /HPF

## 2017-08-21 ENCOUNTER — APPOINTMENT (OUTPATIENT)
Dept: NEPHROLOGY | Facility: CLINIC | Age: 62
End: 2017-08-21
Payer: COMMERCIAL

## 2017-08-21 VITALS — HEART RATE: 60 BPM | SYSTOLIC BLOOD PRESSURE: 116 MMHG | DIASTOLIC BLOOD PRESSURE: 70 MMHG

## 2017-08-21 DIAGNOSIS — R42 DIZZINESS AND GIDDINESS: ICD-10-CM

## 2017-08-21 PROCEDURE — 99213 OFFICE O/P EST LOW 20 MIN: CPT

## 2017-09-14 ENCOUNTER — MEDICATION RENEWAL (OUTPATIENT)
Age: 62
End: 2017-09-14

## 2017-09-15 ENCOUNTER — MEDICATION RENEWAL (OUTPATIENT)
Age: 62
End: 2017-09-15

## 2017-09-18 ENCOUNTER — APPOINTMENT (OUTPATIENT)
Dept: OTOLARYNGOLOGY | Facility: CLINIC | Age: 62
End: 2017-09-18
Payer: COMMERCIAL

## 2017-09-18 VITALS
DIASTOLIC BLOOD PRESSURE: 86 MMHG | WEIGHT: 187 LBS | HEART RATE: 89 BPM | HEIGHT: 69 IN | BODY MASS INDEX: 27.7 KG/M2 | TEMPERATURE: 98.5 F | SYSTOLIC BLOOD PRESSURE: 143 MMHG

## 2017-09-18 DIAGNOSIS — C02.9 MALIGNANT NEOPLASM OF TONGUE, UNSPECIFIED: ICD-10-CM

## 2017-09-18 PROCEDURE — 99214 OFFICE O/P EST MOD 30 MIN: CPT

## 2017-09-24 ENCOUNTER — MEDICATION RENEWAL (OUTPATIENT)
Age: 62
End: 2017-09-24

## 2017-09-28 ENCOUNTER — OUTPATIENT (OUTPATIENT)
Dept: OUTPATIENT SERVICES | Facility: HOSPITAL | Age: 62
LOS: 1 days | End: 2017-09-28
Payer: COMMERCIAL

## 2017-09-28 DIAGNOSIS — Z98.89 OTHER SPECIFIED POSTPROCEDURAL STATES: Chronic | ICD-10-CM

## 2017-09-28 DIAGNOSIS — Z98.890 OTHER SPECIFIED POSTPROCEDURAL STATES: Chronic | ICD-10-CM

## 2017-09-28 DIAGNOSIS — Z41.9 ENCOUNTER FOR PROCEDURE FOR PURPOSES OTHER THAN REMEDYING HEALTH STATE, UNSPECIFIED: Chronic | ICD-10-CM

## 2017-09-28 PROCEDURE — 70553 MRI BRAIN STEM W/O & W/DYE: CPT

## 2017-09-28 PROCEDURE — A9585: CPT

## 2017-09-28 PROCEDURE — 70553 MRI BRAIN STEM W/O & W/DYE: CPT | Mod: 26

## 2017-10-18 ENCOUNTER — OUTPATIENT (OUTPATIENT)
Dept: OUTPATIENT SERVICES | Facility: HOSPITAL | Age: 62
LOS: 1 days | End: 2017-10-18
Payer: COMMERCIAL

## 2017-10-18 DIAGNOSIS — Z41.9 ENCOUNTER FOR PROCEDURE FOR PURPOSES OTHER THAN REMEDYING HEALTH STATE, UNSPECIFIED: Chronic | ICD-10-CM

## 2017-10-18 DIAGNOSIS — Z98.890 OTHER SPECIFIED POSTPROCEDURAL STATES: Chronic | ICD-10-CM

## 2017-10-18 DIAGNOSIS — Z98.89 OTHER SPECIFIED POSTPROCEDURAL STATES: Chronic | ICD-10-CM

## 2017-10-18 PROCEDURE — 72156 MRI NECK SPINE W/O & W/DYE: CPT

## 2017-10-18 PROCEDURE — 72156 MRI NECK SPINE W/O & W/DYE: CPT | Mod: 26

## 2017-10-18 PROCEDURE — A9585: CPT

## 2017-10-25 ENCOUNTER — FORM ENCOUNTER (OUTPATIENT)
Age: 62
End: 2017-10-25

## 2017-10-26 ENCOUNTER — APPOINTMENT (OUTPATIENT)
Dept: RADIATION ONCOLOGY | Facility: CLINIC | Age: 62
End: 2017-10-26
Payer: MEDICARE

## 2017-10-26 VITALS
HEART RATE: 63 BPM | SYSTOLIC BLOOD PRESSURE: 169 MMHG | DIASTOLIC BLOOD PRESSURE: 85 MMHG | BODY MASS INDEX: 28.13 KG/M2 | OXYGEN SATURATION: 99 % | WEIGHT: 190.5 LBS

## 2017-10-26 PROCEDURE — 99215 OFFICE O/P EST HI 40 MIN: CPT | Mod: GC

## 2017-10-27 ENCOUNTER — OTHER (OUTPATIENT)
Age: 62
End: 2017-10-27

## 2017-10-30 ENCOUNTER — MOBILE ON CALL (OUTPATIENT)
Age: 62
End: 2017-10-30

## 2017-10-31 ENCOUNTER — FORM ENCOUNTER (OUTPATIENT)
Age: 62
End: 2017-10-31

## 2017-11-01 ENCOUNTER — OUTPATIENT (OUTPATIENT)
Dept: OUTPATIENT SERVICES | Facility: HOSPITAL | Age: 62
LOS: 1 days | End: 2017-11-01
Payer: COMMERCIAL

## 2017-11-01 DIAGNOSIS — Z98.89 OTHER SPECIFIED POSTPROCEDURAL STATES: Chronic | ICD-10-CM

## 2017-11-01 DIAGNOSIS — Z41.9 ENCOUNTER FOR PROCEDURE FOR PURPOSES OTHER THAN REMEDYING HEALTH STATE, UNSPECIFIED: Chronic | ICD-10-CM

## 2017-11-01 DIAGNOSIS — Z98.890 OTHER SPECIFIED POSTPROCEDURAL STATES: Chronic | ICD-10-CM

## 2017-11-01 PROCEDURE — 72126 CT NECK SPINE W/DYE: CPT

## 2017-11-01 PROCEDURE — 74177 CT ABD & PELVIS W/CONTRAST: CPT

## 2017-11-01 PROCEDURE — 72126 CT NECK SPINE W/DYE: CPT | Mod: 26

## 2017-11-01 PROCEDURE — 74177 CT ABD & PELVIS W/CONTRAST: CPT | Mod: 26

## 2017-11-01 PROCEDURE — 71260 CT THORAX DX C+: CPT

## 2017-11-01 PROCEDURE — 71260 CT THORAX DX C+: CPT | Mod: 26

## 2017-11-14 ENCOUNTER — APPOINTMENT (OUTPATIENT)
Dept: PULMONOLOGY | Facility: CLINIC | Age: 62
End: 2017-11-14
Payer: COMMERCIAL

## 2017-11-14 PROCEDURE — G0008: CPT

## 2017-11-14 PROCEDURE — 90686 IIV4 VACC NO PRSV 0.5 ML IM: CPT

## 2017-11-14 PROCEDURE — 99214 OFFICE O/P EST MOD 30 MIN: CPT | Mod: 25

## 2017-11-28 ENCOUNTER — FORM ENCOUNTER (OUTPATIENT)
Age: 62
End: 2017-11-28

## 2017-11-29 ENCOUNTER — OUTPATIENT (OUTPATIENT)
Dept: OUTPATIENT SERVICES | Facility: HOSPITAL | Age: 62
LOS: 1 days | End: 2017-11-29
Payer: COMMERCIAL

## 2017-11-29 DIAGNOSIS — Z98.89 OTHER SPECIFIED POSTPROCEDURAL STATES: Chronic | ICD-10-CM

## 2017-11-29 DIAGNOSIS — Z98.890 OTHER SPECIFIED POSTPROCEDURAL STATES: Chronic | ICD-10-CM

## 2017-11-29 DIAGNOSIS — Z41.9 ENCOUNTER FOR PROCEDURE FOR PURPOSES OTHER THAN REMEDYING HEALTH STATE, UNSPECIFIED: Chronic | ICD-10-CM

## 2017-11-29 LAB — GLUCOSE BLDC GLUCOMTR-MCNC: 142 MG/DL — HIGH (ref 70–99)

## 2017-11-29 PROCEDURE — 78815 PET IMAGE W/CT SKULL-THIGH: CPT

## 2017-11-29 PROCEDURE — A9552: CPT

## 2017-11-29 PROCEDURE — 82962 GLUCOSE BLOOD TEST: CPT

## 2017-11-29 PROCEDURE — 78815 PET IMAGE W/CT SKULL-THIGH: CPT | Mod: 26

## 2017-12-08 ENCOUNTER — APPOINTMENT (OUTPATIENT)
Dept: THORACIC SURGERY | Facility: CLINIC | Age: 62
End: 2017-12-08
Payer: COMMERCIAL

## 2017-12-08 ENCOUNTER — APPOINTMENT (OUTPATIENT)
Dept: PULMONOLOGY | Facility: CLINIC | Age: 62
End: 2017-12-08
Payer: COMMERCIAL

## 2017-12-08 VITALS
WEIGHT: 185 LBS | TEMPERATURE: 97.7 F | DIASTOLIC BLOOD PRESSURE: 75 MMHG | BODY MASS INDEX: 27.32 KG/M2 | HEART RATE: 91 BPM | RESPIRATION RATE: 19 BRPM | SYSTOLIC BLOOD PRESSURE: 129 MMHG | OXYGEN SATURATION: 95 %

## 2017-12-08 DIAGNOSIS — Z98.890 OTHER SPECIFIED POSTPROCEDURAL STATES: ICD-10-CM

## 2017-12-08 DIAGNOSIS — N88.9 NONINFLAMMATORY DISORDER OF CERVIX UTERI, UNSPECIFIED: ICD-10-CM

## 2017-12-08 PROCEDURE — 94727 GAS DIL/WSHOT DETER LNG VOL: CPT

## 2017-12-08 PROCEDURE — 99215 OFFICE O/P EST HI 40 MIN: CPT

## 2017-12-08 PROCEDURE — 94729 DIFFUSING CAPACITY: CPT

## 2017-12-08 PROCEDURE — 94060 EVALUATION OF WHEEZING: CPT

## 2017-12-11 ENCOUNTER — APPOINTMENT (OUTPATIENT)
Dept: OTOLARYNGOLOGY | Facility: CLINIC | Age: 62
End: 2017-12-11
Payer: COMMERCIAL

## 2017-12-18 ENCOUNTER — APPOINTMENT (OUTPATIENT)
Dept: RADIATION ONCOLOGY | Facility: CLINIC | Age: 62
End: 2017-12-18
Payer: COMMERCIAL

## 2017-12-18 ENCOUNTER — APPOINTMENT (OUTPATIENT)
Dept: OTOLARYNGOLOGY | Facility: CLINIC | Age: 62
End: 2017-12-18
Payer: COMMERCIAL

## 2017-12-18 ENCOUNTER — LABORATORY RESULT (OUTPATIENT)
Age: 62
End: 2017-12-18

## 2017-12-18 VITALS
HEART RATE: 63 BPM | DIASTOLIC BLOOD PRESSURE: 86 MMHG | OXYGEN SATURATION: 97 % | BODY MASS INDEX: 28.26 KG/M2 | SYSTOLIC BLOOD PRESSURE: 129 MMHG | WEIGHT: 191.38 LBS

## 2017-12-18 VITALS
BODY MASS INDEX: 28.29 KG/M2 | TEMPERATURE: 98.8 F | SYSTOLIC BLOOD PRESSURE: 124 MMHG | HEART RATE: 87 BPM | DIASTOLIC BLOOD PRESSURE: 80 MMHG | HEIGHT: 69 IN | WEIGHT: 191 LBS

## 2017-12-18 DIAGNOSIS — F17.200 NICOTINE DEPENDENCE, UNSPECIFIED, UNCOMPLICATED: ICD-10-CM

## 2017-12-18 DIAGNOSIS — K14.8 OTHER DISEASES OF TONGUE: ICD-10-CM

## 2017-12-18 PROCEDURE — 99214 OFFICE O/P EST MOD 30 MIN: CPT | Mod: GC

## 2017-12-18 PROCEDURE — 99213 OFFICE O/P EST LOW 20 MIN: CPT

## 2017-12-19 ENCOUNTER — FORM ENCOUNTER (OUTPATIENT)
Age: 62
End: 2017-12-19

## 2017-12-20 ENCOUNTER — RESULT REVIEW (OUTPATIENT)
Age: 62
End: 2017-12-20

## 2017-12-20 ENCOUNTER — OUTPATIENT (OUTPATIENT)
Dept: OUTPATIENT SERVICES | Facility: HOSPITAL | Age: 62
LOS: 1 days | End: 2017-12-20
Payer: COMMERCIAL

## 2017-12-20 DIAGNOSIS — Z98.890 OTHER SPECIFIED POSTPROCEDURAL STATES: Chronic | ICD-10-CM

## 2017-12-20 DIAGNOSIS — Z41.9 ENCOUNTER FOR PROCEDURE FOR PURPOSES OTHER THAN REMEDYING HEALTH STATE, UNSPECIFIED: Chronic | ICD-10-CM

## 2017-12-20 DIAGNOSIS — Z98.89 OTHER SPECIFIED POSTPROCEDURAL STATES: Chronic | ICD-10-CM

## 2017-12-20 PROCEDURE — 88305 TISSUE EXAM BY PATHOLOGIST: CPT

## 2017-12-20 PROCEDURE — 88311 DECALCIFY TISSUE: CPT

## 2017-12-20 PROCEDURE — 88173 CYTOPATH EVAL FNA REPORT: CPT

## 2017-12-20 PROCEDURE — 77012 CT SCAN FOR NEEDLE BIOPSY: CPT

## 2017-12-20 PROCEDURE — 88360 TUMOR IMMUNOHISTOCHEM/MANUAL: CPT

## 2017-12-20 PROCEDURE — 20225 BONE BIOPSY TROCAR/NDL DEEP: CPT

## 2017-12-20 PROCEDURE — C1769: CPT

## 2017-12-20 PROCEDURE — 88341 IMHCHEM/IMCYTCHM EA ADD ANTB: CPT

## 2017-12-20 PROCEDURE — 77012 CT SCAN FOR NEEDLE BIOPSY: CPT | Mod: 26

## 2017-12-22 LAB
NON-GYNECOLOGICAL CYTOLOGY STUDY: SIGNIFICANT CHANGE UP
SURGICAL PATHOLOGY STUDY: SIGNIFICANT CHANGE UP

## 2017-12-28 ENCOUNTER — MEDICATION RENEWAL (OUTPATIENT)
Age: 62
End: 2017-12-28

## 2018-01-10 ENCOUNTER — APPOINTMENT (OUTPATIENT)
Dept: RADIATION ONCOLOGY | Facility: CLINIC | Age: 63
End: 2018-01-10
Payer: MEDICARE

## 2018-01-10 VITALS
BODY MASS INDEX: 27.62 KG/M2 | SYSTOLIC BLOOD PRESSURE: 143 MMHG | HEART RATE: 105 BPM | DIASTOLIC BLOOD PRESSURE: 83 MMHG | WEIGHT: 187.06 LBS | OXYGEN SATURATION: 99 %

## 2018-01-10 PROCEDURE — 77263 THER RADIOLOGY TX PLNG CPLX: CPT

## 2018-01-10 PROCEDURE — 99214 OFFICE O/P EST MOD 30 MIN: CPT

## 2018-01-10 RX ORDER — BIOTIN 10 MG
TABLET ORAL
Refills: 0 | Status: ACTIVE | COMMUNITY
Start: 2018-01-10

## 2018-01-16 ENCOUNTER — OTHER (OUTPATIENT)
Age: 63
End: 2018-01-16

## 2018-01-17 LAB
ALBUMIN SERPL ELPH-MCNC: 3.5 G/DL
ALP BLD-CCNC: 123 U/L
ALT SERPL-CCNC: 50 U/L
ANION GAP SERPL CALC-SCNC: 17 MMOL/L
APTT BLD: 33.9 SEC
AST SERPL-CCNC: 131 U/L
BASOPHILS # BLD AUTO: 0.05 K/UL
BASOPHILS NFR BLD AUTO: 0.7 %
BILIRUB SERPL-MCNC: 1.4 MG/DL
BUN SERPL-MCNC: 6 MG/DL
CALCIUM SERPL-MCNC: 9.2 MG/DL
CHLORIDE SERPL-SCNC: 95 MMOL/L
CO2 SERPL-SCNC: 29 MMOL/L
CREAT SERPL-MCNC: 0.77 MG/DL
EOSINOPHIL # BLD AUTO: 0.17 K/UL
EOSINOPHIL NFR BLD AUTO: 2.4 %
GLUCOSE SERPL-MCNC: 117 MG/DL
HCT VFR BLD CALC: 46.4 %
HGB BLD-MCNC: 16.2 G/DL
IMM GRANULOCYTES NFR BLD AUTO: 0.1 %
INR PPP: 0.98 RATIO
LYMPHOCYTES # BLD AUTO: 1.77 K/UL
LYMPHOCYTES NFR BLD AUTO: 24.7 %
MAN DIFF?: NORMAL
MCHC RBC-ENTMCNC: 34.9 GM/DL
MCHC RBC-ENTMCNC: 37.5 PG
MCV RBC AUTO: 107.4 FL
MONOCYTES # BLD AUTO: 0.69 K/UL
MONOCYTES NFR BLD AUTO: 9.6 %
NEUTROPHILS # BLD AUTO: 4.48 K/UL
NEUTROPHILS NFR BLD AUTO: 62.5 %
PLATELET # BLD AUTO: 206 K/UL
POTASSIUM SERPL-SCNC: 3.6 MMOL/L
PROT SERPL-MCNC: 7.6 G/DL
PT BLD: 11.1 SEC
RBC # BLD: 4.32 M/UL
RBC # FLD: 14.5 %
SODIUM SERPL-SCNC: 141 MMOL/L
WBC # FLD AUTO: 7.17 K/UL

## 2018-01-29 ENCOUNTER — OUTPATIENT (OUTPATIENT)
Dept: OUTPATIENT SERVICES | Facility: HOSPITAL | Age: 63
LOS: 1 days | End: 2018-01-29
Payer: MEDICARE

## 2018-01-29 DIAGNOSIS — Z98.890 OTHER SPECIFIED POSTPROCEDURAL STATES: Chronic | ICD-10-CM

## 2018-01-29 DIAGNOSIS — Z41.9 ENCOUNTER FOR PROCEDURE FOR PURPOSES OTHER THAN REMEDYING HEALTH STATE, UNSPECIFIED: Chronic | ICD-10-CM

## 2018-01-29 DIAGNOSIS — Z98.89 OTHER SPECIFIED POSTPROCEDURAL STATES: Chronic | ICD-10-CM

## 2018-01-29 PROCEDURE — 71046 X-RAY EXAM CHEST 2 VIEWS: CPT

## 2018-01-29 PROCEDURE — 71046 X-RAY EXAM CHEST 2 VIEWS: CPT | Mod: 26

## 2018-01-31 ENCOUNTER — LABORATORY RESULT (OUTPATIENT)
Age: 63
End: 2018-01-31

## 2018-01-31 VITALS
DIASTOLIC BLOOD PRESSURE: 77 MMHG | OXYGEN SATURATION: 97 % | BODY MASS INDEX: 27.02 KG/M2 | RESPIRATION RATE: 18 BRPM | SYSTOLIC BLOOD PRESSURE: 115 MMHG | HEART RATE: 72 BPM | WEIGHT: 183 LBS

## 2018-02-01 LAB
25(OH)D3 SERPL-MCNC: 55.7 NG/ML
ALBUMIN SERPL ELPH-MCNC: 3.6 G/DL
ALP BLD-CCNC: 97 U/L
ALT SERPL-CCNC: 28 U/L
ANION GAP SERPL CALC-SCNC: 17 MMOL/L
APPEARANCE: ABNORMAL
AST SERPL-CCNC: 33 U/L
BACTERIA: ABNORMAL
BASOPHILS # BLD AUTO: 0.06 K/UL
BASOPHILS NFR BLD AUTO: 0.6 %
BILIRUB SERPL-MCNC: 1.2 MG/DL
BILIRUBIN URINE: ABNORMAL
BLOOD URINE: NEGATIVE
BUN SERPL-MCNC: 8 MG/DL
CALCIUM SERPL-MCNC: 10.2 MG/DL
CHLORIDE SERPL-SCNC: 90 MMOL/L
CO2 SERPL-SCNC: 31 MMOL/L
COLOR: ABNORMAL
CREAT SERPL-MCNC: 0.95 MG/DL
CREAT SPEC-SCNC: 302 MG/DL
CREAT/PROT UR: 0.1 RATIO
EOSINOPHIL # BLD AUTO: 0.19 K/UL
EOSINOPHIL NFR BLD AUTO: 2 %
GLUCOSE QUALITATIVE U: NEGATIVE MG/DL
GLUCOSE SERPL-MCNC: 127 MG/DL
HCT VFR BLD CALC: 42 %
HGB BLD-MCNC: 14.1 G/DL
HYALINE CASTS: 0 /LPF
IMM GRANULOCYTES NFR BLD AUTO: 0.6 %
KETONES URINE: ABNORMAL
LEUKOCYTE ESTERASE URINE: ABNORMAL
LYMPHOCYTES # BLD AUTO: 1.72 K/UL
LYMPHOCYTES NFR BLD AUTO: 18.5 %
MAGNESIUM SERPL-MCNC: 1.3 MG/DL
MAN DIFF?: NORMAL
MCHC RBC-ENTMCNC: 33.6 GM/DL
MCHC RBC-ENTMCNC: 36.7 PG
MCV RBC AUTO: 109.4 FL
MICROSCOPIC-UA: NORMAL
MONOCYTES # BLD AUTO: 1.33 K/UL
MONOCYTES NFR BLD AUTO: 14.3 %
NEUTROPHILS # BLD AUTO: 5.96 K/UL
NEUTROPHILS NFR BLD AUTO: 64 %
NITRITE URINE: NEGATIVE
PH URINE: 5.5
PHOSPHATE SERPL-MCNC: 3.3 MG/DL
PLATELET # BLD AUTO: 449 K/UL
POTASSIUM SERPL-SCNC: 3.5 MMOL/L
PROT SERPL-MCNC: 7.6 G/DL
PROT UR-MCNC: 31 MG/DL
PROTEIN URINE: ABNORMAL MG/DL
RBC # BLD: 3.84 M/UL
RBC # FLD: 13.6 %
RED BLOOD CELLS URINE: 10 /HPF
SODIUM SERPL-SCNC: 138 MMOL/L
SPECIFIC GRAVITY URINE: 1.02
SQUAMOUS EPITHELIAL CELLS: 12 /HPF
URATE SERPL-MCNC: 6.6 MG/DL
URINE COMMENTS: NORMAL
UROBILINOGEN URINE: 1 MG/DL
WBC # FLD AUTO: 9.32 K/UL
WHITE BLOOD CELLS URINE: 28 /HPF

## 2018-02-05 ENCOUNTER — APPOINTMENT (OUTPATIENT)
Dept: NEPHROLOGY | Facility: CLINIC | Age: 63
End: 2018-02-05
Payer: MEDICARE

## 2018-02-05 VITALS — HEART RATE: 68 BPM | SYSTOLIC BLOOD PRESSURE: 124 MMHG | DIASTOLIC BLOOD PRESSURE: 72 MMHG
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
## 2018-02-05 PROCEDURE — 99214 OFFICE O/P EST MOD 30 MIN: CPT

## 2018-02-09 ENCOUNTER — TRANSCRIPTION ENCOUNTER (OUTPATIENT)
Age: 63
End: 2018-02-09

## 2018-02-14 ENCOUNTER — LABORATORY RESULT (OUTPATIENT)
Age: 63
End: 2018-02-14

## 2018-02-20 ENCOUNTER — APPOINTMENT (OUTPATIENT)
Dept: NEPHROLOGY | Facility: CLINIC | Age: 63
End: 2018-02-20

## 2018-02-28 ENCOUNTER — APPOINTMENT (OUTPATIENT)
Age: 63
End: 2018-02-28
Payer: MEDICARE

## 2018-02-28 VITALS
BODY MASS INDEX: 27.45 KG/M2 | OXYGEN SATURATION: 98 % | RESPIRATION RATE: 18 BRPM | HEART RATE: 94 BPM | WEIGHT: 185.9 LBS

## 2018-02-28 PROCEDURE — 99024 POSTOP FOLLOW-UP VISIT: CPT

## 2018-04-23 ENCOUNTER — FORM ENCOUNTER (OUTPATIENT)
Age: 63
End: 2018-04-23

## 2018-05-23 ENCOUNTER — MEDICATION RENEWAL (OUTPATIENT)
Age: 63
End: 2018-05-23

## 2018-05-23 ENCOUNTER — APPOINTMENT (OUTPATIENT)
Age: 63
End: 2018-05-23
Payer: MEDICARE

## 2018-05-23 VITALS — BODY MASS INDEX: 27.22 KG/M2 | WEIGHT: 184.3 LBS

## 2018-05-23 VITALS
OXYGEN SATURATION: 100 % | HEART RATE: 91 BPM | SYSTOLIC BLOOD PRESSURE: 153 MMHG | DIASTOLIC BLOOD PRESSURE: 104 MMHG | RESPIRATION RATE: 18 BRPM

## 2018-05-23 VITALS — DIASTOLIC BLOOD PRESSURE: 80 MMHG | SYSTOLIC BLOOD PRESSURE: 150 MMHG

## 2018-05-23 PROCEDURE — 99214 OFFICE O/P EST MOD 30 MIN: CPT

## 2018-06-27 ENCOUNTER — FORM ENCOUNTER (OUTPATIENT)
Age: 63
End: 2018-06-27

## 2018-06-28 ENCOUNTER — APPOINTMENT (OUTPATIENT)
Dept: NEPHROLOGY | Facility: CLINIC | Age: 63
End: 2018-06-28
Payer: MEDICARE

## 2018-06-28 ENCOUNTER — OUTPATIENT (OUTPATIENT)
Dept: OUTPATIENT SERVICES | Facility: HOSPITAL | Age: 63
LOS: 1 days | End: 2018-06-28
Payer: MEDICARE

## 2018-06-28 VITALS — SYSTOLIC BLOOD PRESSURE: 120 MMHG | HEART RATE: 68 BPM | DIASTOLIC BLOOD PRESSURE: 80 MMHG

## 2018-06-28 DIAGNOSIS — Z41.9 ENCOUNTER FOR PROCEDURE FOR PURPOSES OTHER THAN REMEDYING HEALTH STATE, UNSPECIFIED: Chronic | ICD-10-CM

## 2018-06-28 DIAGNOSIS — M25.569 PAIN IN UNSPECIFIED KNEE: ICD-10-CM

## 2018-06-28 DIAGNOSIS — Z98.890 OTHER SPECIFIED POSTPROCEDURAL STATES: Chronic | ICD-10-CM

## 2018-06-28 DIAGNOSIS — Z98.89 OTHER SPECIFIED POSTPROCEDURAL STATES: Chronic | ICD-10-CM

## 2018-06-28 PROCEDURE — 99214 OFFICE O/P EST MOD 30 MIN: CPT

## 2018-06-28 PROCEDURE — 73562 X-RAY EXAM OF KNEE 3: CPT

## 2018-06-28 PROCEDURE — 73562 X-RAY EXAM OF KNEE 3: CPT | Mod: 26,RT

## 2018-08-02 LAB
ALBUMIN SERPL ELPH-MCNC: 4 G/DL
ALP BLD-CCNC: 114 U/L
ALT SERPL-CCNC: 31 U/L
ANION GAP SERPL CALC-SCNC: 21 MMOL/L
APPEARANCE: ABNORMAL
AST SERPL-CCNC: 72 U/L
BACTERIA: ABNORMAL
BASOPHILS # BLD AUTO: 0.06 K/UL
BASOPHILS NFR BLD AUTO: 0.9 %
BILIRUB SERPL-MCNC: 1.5 MG/DL
BILIRUBIN URINE: ABNORMAL
BLOOD URINE: NEGATIVE
BUN SERPL-MCNC: 12 MG/DL
CALCIUM OXALATE CRYSTALS: ABNORMAL
CALCIUM SERPL-MCNC: 10.6 MG/DL
CHLORIDE SERPL-SCNC: 96 MMOL/L
CO2 SERPL-SCNC: 28 MMOL/L
COLOR: ABNORMAL
CREAT SERPL-MCNC: 0.95 MG/DL
CREAT SPEC-SCNC: 322 MG/DL
EOSINOPHIL # BLD AUTO: 0 K/UL
EOSINOPHIL NFR BLD AUTO: 0 %
GLUCOSE QUALITATIVE U: NEGATIVE MG/DL
GLUCOSE SERPL-MCNC: 156 MG/DL
HCT VFR BLD CALC: 46 %
HGB BLD-MCNC: 15.7 G/DL
KETONES URINE: ABNORMAL
LEUKOCYTE ESTERASE URINE: ABNORMAL
LYMPHOCYTES # BLD AUTO: 1.47 K/UL
LYMPHOCYTES NFR BLD AUTO: 21.6 %
MAGNESIUM SERPL-MCNC: 1.6 MG/DL
MAN DIFF?: NORMAL
MCHC RBC-ENTMCNC: 34.1 GM/DL
MCHC RBC-ENTMCNC: 38.3 PG
MCV RBC AUTO: 112.2 FL
MICROALBUMIN 24H UR DL<=1MG/L-MCNC: 6.7 MG/DL
MICROALBUMIN/CREAT 24H UR-RTO: 21 MG/G
MICROSCOPIC-UA: NORMAL
MONOCYTES # BLD AUTO: 1 K/UL
MONOCYTES NFR BLD AUTO: 14.7 %
NEUTROPHILS # BLD AUTO: 4.16 K/UL
NEUTROPHILS NFR BLD AUTO: 61.1 %
NITRITE URINE: POSITIVE
PH URINE: 5
PHOSPHATE SERPL-MCNC: 4.2 MG/DL
PLATELET # BLD AUTO: 268 K/UL
POTASSIUM SERPL-SCNC: 5 MMOL/L
PROT SERPL-MCNC: 8 G/DL
PROTEIN URINE: 100 MG/DL
RBC # BLD: 4.1 M/UL
RBC # FLD: 14.4 %
RED BLOOD CELLS URINE: 0 /HPF
SODIUM SERPL-SCNC: 145 MMOL/L
SPECIFIC GRAVITY URINE: 1.04
SQUAMOUS EPITHELIAL CELLS: 22 /HPF
UROBILINOGEN URINE: 1 MG/DL
WBC # FLD AUTO: 6.81 K/UL
WHITE BLOOD CELLS URINE: 10 /HPF

## 2018-09-04 ENCOUNTER — APPOINTMENT (OUTPATIENT)
Age: 63
End: 2018-09-04
Payer: MEDICARE

## 2018-09-04 VITALS
BODY MASS INDEX: 27.29 KG/M2 | DIASTOLIC BLOOD PRESSURE: 86 MMHG | HEART RATE: 93 BPM | WEIGHT: 184.8 LBS | SYSTOLIC BLOOD PRESSURE: 123 MMHG | OXYGEN SATURATION: 96 %

## 2018-09-04 PROCEDURE — 99214 OFFICE O/P EST MOD 30 MIN: CPT

## 2018-09-06 ENCOUNTER — APPOINTMENT (OUTPATIENT)
Dept: ORTHOPEDIC SURGERY | Facility: CLINIC | Age: 63
End: 2018-09-06
Payer: MEDICARE

## 2018-09-06 PROCEDURE — 99204 OFFICE O/P NEW MOD 45 MIN: CPT

## 2018-09-22 ENCOUNTER — MEDICATION RENEWAL (OUTPATIENT)
Age: 63
End: 2018-09-22

## 2018-10-22 ENCOUNTER — LABORATORY RESULT (OUTPATIENT)
Age: 63
End: 2018-10-22

## 2018-10-22 ENCOUNTER — APPOINTMENT (OUTPATIENT)
Dept: INTERNAL MEDICINE | Facility: CLINIC | Age: 63
End: 2018-10-22
Payer: MEDICARE

## 2018-10-22 VITALS
SYSTOLIC BLOOD PRESSURE: 151 MMHG | OXYGEN SATURATION: 96 % | TEMPERATURE: 98.7 F | DIASTOLIC BLOOD PRESSURE: 91 MMHG | RESPIRATION RATE: 17 BRPM | HEART RATE: 95 BPM

## 2018-10-22 DIAGNOSIS — Z72.0 TOBACCO USE: ICD-10-CM

## 2018-10-22 DIAGNOSIS — F10.10 ALCOHOL ABUSE, UNCOMPLICATED: ICD-10-CM

## 2018-10-22 DIAGNOSIS — Z13.89 ENCOUNTER FOR SCREENING FOR OTHER DISORDER: ICD-10-CM

## 2018-10-22 DIAGNOSIS — Z23 ENCOUNTER FOR IMMUNIZATION: ICD-10-CM

## 2018-10-22 DIAGNOSIS — Z80.1 FAMILY HISTORY OF MALIGNANT NEOPLASM OF TRACHEA, BRONCHUS AND LUNG: ICD-10-CM

## 2018-10-22 DIAGNOSIS — Z80.3 FAMILY HISTORY OF MALIGNANT NEOPLASM OF BREAST: ICD-10-CM

## 2018-10-22 DIAGNOSIS — Z87.891 PERSONAL HISTORY OF NICOTINE DEPENDENCE: ICD-10-CM

## 2018-10-22 DIAGNOSIS — E78.5 HYPERLIPIDEMIA, UNSPECIFIED: ICD-10-CM

## 2018-10-22 DIAGNOSIS — Z80.0 FAMILY HISTORY OF MALIGNANT NEOPLASM OF DIGESTIVE ORGANS: ICD-10-CM

## 2018-10-22 DIAGNOSIS — Z60.2 PROBLEMS RELATED TO LIVING ALONE: ICD-10-CM

## 2018-10-22 PROCEDURE — 90662 IIV NO PRSV INCREASED AG IM: CPT

## 2018-10-22 PROCEDURE — G0444 DEPRESSION SCREEN ANNUAL: CPT | Mod: 59

## 2018-10-22 PROCEDURE — 99204 OFFICE O/P NEW MOD 45 MIN: CPT | Mod: 25

## 2018-10-22 PROCEDURE — 36415 COLL VENOUS BLD VENIPUNCTURE: CPT

## 2018-10-22 PROCEDURE — G0008: CPT

## 2018-10-22 RX ORDER — OMEGA-3/DHA/EPA/FISH OIL 300-1000MG
1000 CAPSULE ORAL
Refills: 0 | Status: DISCONTINUED | COMMUNITY
Start: 2018-05-23 | End: 2018-10-22

## 2018-10-22 SDOH — SOCIAL STABILITY - SOCIAL INSECURITY: PROBLEMS RELATED TO LIVING ALONE: Z60.2

## 2018-10-25 LAB
ALBUMIN SERPL ELPH-MCNC: 4.1 G/DL
ALDOLASE SERPL-CCNC: 7 U/L
ALP BLD-CCNC: 110 U/L
ALT SERPL-CCNC: 42 U/L
ANION GAP SERPL CALC-SCNC: 23 MMOL/L
AST SERPL-CCNC: 90 U/L
BASOPHILS # BLD AUTO: 0.05 K/UL
BASOPHILS NFR BLD AUTO: 0.9 %
BILIRUB SERPL-MCNC: 1.3 MG/DL
BUN SERPL-MCNC: 5 MG/DL
CALCIUM SERPL-MCNC: 10 MG/DL
CHLORIDE SERPL-SCNC: 98 MMOL/L
CHOLEST SERPL-MCNC: 203 MG/DL
CHOLEST/HDLC SERPL: 2.6 RATIO
CK SERPL-CCNC: 108 U/L
CO2 SERPL-SCNC: 26 MMOL/L
CREAT SERPL-MCNC: 0.69 MG/DL
CRP SERPL-MCNC: 0.82 MG/DL
EOSINOPHIL # BLD AUTO: 0.05 K/UL
EOSINOPHIL NFR BLD AUTO: 0.9 %
ERYTHROCYTE [SEDIMENTATION RATE] IN BLOOD BY WESTERGREN METHOD: 46 MM/HR
GLUCOSE SERPL-MCNC: 124 MG/DL
HBA1C MFR BLD HPLC: 5.4 %
HCT VFR BLD CALC: 52.4 %
HDLC SERPL-MCNC: 79 MG/DL
HGB BLD-MCNC: 17.9 G/DL
IMM GRANULOCYTES NFR BLD AUTO: 0.3 %
LDLC SERPL CALC-MCNC: 107 MG/DL
LYMPHOCYTES # BLD AUTO: 1.27 K/UL
LYMPHOCYTES NFR BLD AUTO: 22.2 %
MAN DIFF?: NORMAL
MCHC RBC-ENTMCNC: 34.2 GM/DL
MCHC RBC-ENTMCNC: 40 PG
MCV RBC AUTO: 117.2 FL
MONOCYTES # BLD AUTO: 0.64 K/UL
MONOCYTES NFR BLD AUTO: 11.2 %
NEUTROPHILS # BLD AUTO: 3.69 K/UL
NEUTROPHILS NFR BLD AUTO: 64.5 %
PLATELET # BLD AUTO: 223 K/UL
POTASSIUM SERPL-SCNC: 3.7 MMOL/L
PROT SERPL-MCNC: 7.9 G/DL
RBC # BLD: 4.47 M/UL
RBC # FLD: 14.4 %
SODIUM SERPL-SCNC: 147 MMOL/L
T PALLIDUM AB SER QL IA: NEGATIVE
TRIGL SERPL-MCNC: 86 MG/DL
TSH SERPL-ACNC: 0.48 UIU/ML
WBC # FLD AUTO: 5.72 K/UL

## 2018-10-29 ENCOUNTER — FORM ENCOUNTER (OUTPATIENT)
Age: 63
End: 2018-10-29

## 2018-10-30 ENCOUNTER — APPOINTMENT (OUTPATIENT)
Dept: ULTRASOUND IMAGING | Facility: HOSPITAL | Age: 63
End: 2018-10-30
Payer: MEDICARE

## 2018-10-30 ENCOUNTER — OUTPATIENT (OUTPATIENT)
Dept: OUTPATIENT SERVICES | Facility: HOSPITAL | Age: 63
LOS: 1 days | End: 2018-10-30
Payer: MEDICARE

## 2018-10-30 DIAGNOSIS — Z98.890 OTHER SPECIFIED POSTPROCEDURAL STATES: Chronic | ICD-10-CM

## 2018-10-30 DIAGNOSIS — Z41.9 ENCOUNTER FOR PROCEDURE FOR PURPOSES OTHER THAN REMEDYING HEALTH STATE, UNSPECIFIED: Chronic | ICD-10-CM

## 2018-10-30 DIAGNOSIS — Z98.89 OTHER SPECIFIED POSTPROCEDURAL STATES: Chronic | ICD-10-CM

## 2018-10-30 PROCEDURE — 76536 US EXAM OF HEAD AND NECK: CPT

## 2018-10-30 PROCEDURE — 76536 US EXAM OF HEAD AND NECK: CPT | Mod: 26

## 2018-10-31 ENCOUNTER — FORM ENCOUNTER (OUTPATIENT)
Age: 63
End: 2018-10-31

## 2018-11-01 LAB — EPO SERPL-MCNC: 24.7 MIU/ML

## 2018-11-03 ENCOUNTER — CLINICAL ADVICE (OUTPATIENT)
Age: 63
End: 2018-11-03

## 2018-11-19 ENCOUNTER — APPOINTMENT (OUTPATIENT)
Dept: INTERNAL MEDICINE | Facility: CLINIC | Age: 63
End: 2018-11-19
Payer: MEDICARE

## 2018-11-19 VITALS
BODY MASS INDEX: 26.51 KG/M2 | DIASTOLIC BLOOD PRESSURE: 90 MMHG | SYSTOLIC BLOOD PRESSURE: 128 MMHG | OXYGEN SATURATION: 97 % | WEIGHT: 179 LBS | RESPIRATION RATE: 16 BRPM | TEMPERATURE: 98.3 F | HEART RATE: 98 BPM | HEIGHT: 69 IN

## 2018-11-19 PROCEDURE — 99215 OFFICE O/P EST HI 40 MIN: CPT | Mod: 25

## 2018-11-19 PROCEDURE — 90670 PCV13 VACCINE IM: CPT

## 2018-11-19 PROCEDURE — G0009: CPT

## 2018-11-19 RX ORDER — NAPROXEN SODIUM 220 MG/1
1000 TABLET ORAL
Refills: 0 | Status: DISCONTINUED | COMMUNITY
Start: 2018-01-10 | End: 2018-11-19

## 2018-11-19 RX ORDER — MELOXICAM 7.5 MG/1
7.5 TABLET ORAL
Qty: 90 | Refills: 0 | Status: DISCONTINUED | COMMUNITY
Start: 2018-09-06 | End: 2018-11-19

## 2018-12-02 ENCOUNTER — MEDICATION RENEWAL (OUTPATIENT)
Age: 63
End: 2018-12-02

## 2018-12-16 ENCOUNTER — MEDICATION RENEWAL (OUTPATIENT)
Age: 63
End: 2018-12-16

## 2018-12-17 ENCOUNTER — APPOINTMENT (OUTPATIENT)
Dept: INTERNAL MEDICINE | Facility: CLINIC | Age: 63
End: 2018-12-17
Payer: MEDICARE

## 2018-12-17 VITALS
SYSTOLIC BLOOD PRESSURE: 122 MMHG | WEIGHT: 180 LBS | TEMPERATURE: 98.9 F | BODY MASS INDEX: 26.66 KG/M2 | HEART RATE: 100 BPM | OXYGEN SATURATION: 94 % | DIASTOLIC BLOOD PRESSURE: 80 MMHG | HEIGHT: 69 IN

## 2018-12-17 DIAGNOSIS — G62.9 POLYNEUROPATHY, UNSPECIFIED: ICD-10-CM

## 2018-12-17 DIAGNOSIS — R94.5 ABNORMAL RESULTS OF LIVER FUNCTION STUDIES: ICD-10-CM

## 2018-12-17 DIAGNOSIS — E05.10 THYROTOXICOSIS WITH TOXIC SINGLE THYROID NODULE W/OUT THYROTOXIC CRISIS OR STORM: ICD-10-CM

## 2018-12-17 PROCEDURE — 99214 OFFICE O/P EST MOD 30 MIN: CPT | Mod: 25

## 2018-12-17 PROCEDURE — 36415 COLL VENOUS BLD VENIPUNCTURE: CPT

## 2018-12-18 LAB
ALBUMIN SERPL ELPH-MCNC: 3.6 G/DL
ALP BLD-CCNC: 126 U/L
ALT SERPL-CCNC: 40 U/L
ANION GAP SERPL CALC-SCNC: 19 MMOL/L
AST SERPL-CCNC: 115 U/L
BILIRUB SERPL-MCNC: 1.3 MG/DL
BUN SERPL-MCNC: 5 MG/DL
CALCIUM SERPL-MCNC: 10.2 MG/DL
CHLORIDE SERPL-SCNC: 97 MMOL/L
CO2 SERPL-SCNC: 28 MMOL/L
CREAT SERPL-MCNC: 0.76 MG/DL
GLUCOSE SERPL-MCNC: 121 MG/DL
POTASSIUM SERPL-SCNC: 3.8 MMOL/L
PROT SERPL-MCNC: 8 G/DL
SODIUM SERPL-SCNC: 144 MMOL/L
TSH SERPL-ACNC: 0.56 UIU/ML

## 2018-12-20 ENCOUNTER — APPOINTMENT (OUTPATIENT)
Dept: NEPHROLOGY | Facility: CLINIC | Age: 63
End: 2018-12-20

## 2018-12-27 ENCOUNTER — APPOINTMENT (OUTPATIENT)
Dept: ORTHOPEDIC SURGERY | Facility: CLINIC | Age: 63
End: 2018-12-27
Payer: MEDICARE

## 2018-12-27 DIAGNOSIS — M54.31 SCIATICA, RIGHT SIDE: ICD-10-CM

## 2018-12-27 DIAGNOSIS — R26.9 UNSPECIFIED ABNORMALITIES OF GAIT AND MOBILITY: ICD-10-CM

## 2018-12-27 DIAGNOSIS — M54.32 SCIATICA, LEFT SIDE: ICD-10-CM

## 2018-12-27 PROCEDURE — 99213 OFFICE O/P EST LOW 20 MIN: CPT

## 2019-01-05 PROBLEM — E05.10 TOXIC THYROID NODULE: Status: RESOLVED | Noted: 2019-01-05 | Resolved: 2019-01-05

## 2019-01-09 ENCOUNTER — APPOINTMENT (OUTPATIENT)
Dept: PHYSICAL MEDICINE AND REHAB | Facility: CLINIC | Age: 64
End: 2019-01-09
Payer: MEDICARE

## 2019-01-09 VITALS — HEIGHT: 69 IN | WEIGHT: 180 LBS | BODY MASS INDEX: 26.66 KG/M2

## 2019-01-09 VITALS — OXYGEN SATURATION: 93 % | HEART RATE: 88 BPM

## 2019-01-09 PROCEDURE — 99204 OFFICE O/P NEW MOD 45 MIN: CPT

## 2019-01-09 NOTE — DATA REVIEWED
[FreeTextEntry1] : MR LS 12/2017: L4/L5 disc height loss with grade 1 anterolisthesis of L4 on L5 and bilateral facet arthrosis. There is moderate central canal stenosis, and severe bilateral foraminal narrowing. L5/S1 disc height loss with small disc bulge and bilateral facet arthrosis. \par

## 2019-01-09 NOTE — ASSESSMENT
[FreeTextEntry1] : Impression:\par 1. Lumbar Spinal Stenosis\par \par Plan: After review of the history, physical examination, and imaging, the patient's symptoms are consistent with Lumbar Spinal Stenosis with claudication. The imaging results and diagnosis were discussed in detail with the patient. We discussed all the potential treatment options including physical therapy, oral medication, interventional spine procedures, and surgery; as well as alternative therapeutics such as acupuncture and massage. We also discussed the importance of weight loss, ergonomics, and posture in the long term management of the condition. At this time the patient is interested in interventional options for symptom reduction. I have offered the patient the option of an epidural steroid injection. We discussed all the risks, benefits, alternative treatments, and prognosis. The patient expressed understanding and would like to move forward. We will schedule for the injection as well as follow up post-procedure. We will plan for Caudal GEO. I have given the patient a referral for a new MRI LS prior to having the injection. The patient expressed verbal understanding and is in agreement with the plan of care. All of the patient's questions and concerns were addressed during today's visit.

## 2019-01-09 NOTE — REVIEW OF SYSTEMS
[Patient Intake Form Reviewed] : Patient intake form was reviewed [FreeTextEntry1] : Constitutional: no chills, not feeling tired and no fever. \par Eyes: no discharge, no pain and no redness. \par ENT: no nasal discharge, no nosebleeds and no sore throat. \par Cardiovascular: no chest pain, no palpitations and the heart rate was not fast. \par Respiratory: no shortness of breath, no cough and no wheezing. \par Gastrointestinal: no abdominal pain, no diarrhea, no vomiting and no melena. \par Genitourinary: no pelvic pain, no dysuria, no abnormal urethral discharge and no frequency. \par Integumentary: no skin lesions and no change in a mole. \par Neurological: no dizziness, no fainting and no convulsions. \par Endocrine: no deepening of the voice and no hot flashes. \par Hematologic/Lymphatic: does not bleed easily, no swollen glands and no cervical lymphadenopathy. \par Musculoskeletal: as per HPI, otherwise denies additional joint pain, effusions, stiffness.\par All other review of systems are negative except as noted.

## 2019-01-09 NOTE — HISTORY OF PRESENT ILLNESS
[FreeTextEntry1] : Ms. YAMILA WILLAMS is a very pleasant 63 year female who comes in for evaluation of the lower back and bilateral buttocks and thighs  that has been ongoing for one year getting worse without any specific injury or inciting event. The patient has recently done PT without any improvement. The pain is located primarily bilateral buttocks radiating to the back of the thighs intermittent in nature and described as severe pain . The pain is rated as 9/10 during today's visit, and ranges from 8-10/10. The patient's symptoms are aggravated by walking and standing for a prolonged time, worse in the mornings  and alleviated by sitting/bending Advil and Tylenol . The patient denies any night pain, numbness/tingling, weakness, or bowel/bladder dysfunction. The patient has no other complaints at this time.\par \par \par

## 2019-01-09 NOTE — PHYSICAL EXAM
[FreeTextEntry1] : GEN: AAOx3, NAD.\par PSYCH: Normal mood and affect. Responds appropriately to commands.\par EYES: Sclerae Anicteric. No discharge. EOMI.\par RESP: Breathing unlabored.\par CV: DP pulses 2+ and equal. No varicosities noted.\par EXT: No C/C/E.\par SKIN: No lesions noted.\par STRENGTH: 5/5 bilateral hip flexors, knee extensors, knee flexors, ankle dorsiflexors, long toe extensors, ankle plantar flexors, hip extensors, hip abductors.\par TONE: Normal, No clonus.\par REFLEXES: 1+ symmetric patella, absent bilateral achilles. Plantars downgoing bilaterally.\par SENS: Grossly intact to light touch bilateral lower extremities.\par INSP: Spine alignment is midline, with no evidence of scoliosis.\par STANCE: No Trendelenburg with single leg stance.\par GAIT: Non antalgic, normal reciprocating heel to toe\par LUMBAR ROM: Flexion full/pain free. Extension and bilateral oblique extension limited with axial symptoms. \par PALP: There is no tenderness over the midline spinous processes, paravertebral muscles, SIJ, or greater trochanters bilaterally.\par SPECIAL: SLR and Slump test negative bilaterally. FADIR, MATTHEW negative bilaterally.

## 2019-01-09 NOTE — CONSULT LETTER
[Dear  ___] : Dear  [unfilled], [FreeTextEntry1] : Thank you for referring YAMILA WILLAMS for consultation and evaluation of back/leg pain. Please see my attached note for further details regarding his assessment and plan of care. Thank you very much for allowing me to participate in the care of your patient. \par If you have any questions, please do not hesitate to contact me. \par Sincerely,\par Don Harding MD\par

## 2019-01-13 ENCOUNTER — FORM ENCOUNTER (OUTPATIENT)
Age: 64
End: 2019-01-13

## 2019-01-15 ENCOUNTER — APPOINTMENT (OUTPATIENT)
Dept: PHYSICAL MEDICINE AND REHAB | Facility: CLINIC | Age: 64
End: 2019-01-15
Payer: MEDICARE

## 2019-01-15 PROCEDURE — 62323 NJX INTERLAMINAR LMBR/SAC: CPT

## 2019-01-16 ENCOUNTER — OTHER (OUTPATIENT)
Age: 64
End: 2019-01-16

## 2019-01-21 ENCOUNTER — OTHER (OUTPATIENT)
Age: 64
End: 2019-01-21

## 2019-01-28 ENCOUNTER — APPOINTMENT (OUTPATIENT)
Dept: INTERNAL MEDICINE | Facility: CLINIC | Age: 64
End: 2019-01-28
Payer: MEDICARE

## 2019-01-28 VITALS
TEMPERATURE: 98.6 F | BODY MASS INDEX: 26.96 KG/M2 | HEIGHT: 69 IN | HEART RATE: 84 BPM | DIASTOLIC BLOOD PRESSURE: 88 MMHG | SYSTOLIC BLOOD PRESSURE: 143 MMHG | WEIGHT: 182 LBS | OXYGEN SATURATION: 97 %

## 2019-01-28 PROCEDURE — 99214 OFFICE O/P EST MOD 30 MIN: CPT

## 2019-01-29 NOTE — REASON FOR VISIT
[Routine Follow-Up] : routine follow-up visit for [Bone Metastasis] : bone metastasis [Other: _____] : [unfilled]

## 2019-01-30 ENCOUNTER — APPOINTMENT (OUTPATIENT)
Dept: PHYSICAL MEDICINE AND REHAB | Facility: CLINIC | Age: 64
End: 2019-01-30
Payer: MEDICARE

## 2019-01-30 ENCOUNTER — APPOINTMENT (OUTPATIENT)
Dept: RADIATION ONCOLOGY | Facility: CLINIC | Age: 64
End: 2019-01-30
Payer: MEDICARE

## 2019-01-30 VITALS
RESPIRATION RATE: 18 BRPM | BODY MASS INDEX: 26.88 KG/M2 | SYSTOLIC BLOOD PRESSURE: 138 MMHG | HEART RATE: 78 BPM | DIASTOLIC BLOOD PRESSURE: 80 MMHG | WEIGHT: 182 LBS | OXYGEN SATURATION: 99 %

## 2019-01-30 VITALS
HEIGHT: 69 IN | HEART RATE: 75 BPM | OXYGEN SATURATION: 97 % | SYSTOLIC BLOOD PRESSURE: 140 MMHG | WEIGHT: 182 LBS | BODY MASS INDEX: 26.96 KG/M2 | DIASTOLIC BLOOD PRESSURE: 80 MMHG

## 2019-01-30 PROCEDURE — 99214 OFFICE O/P EST MOD 30 MIN: CPT

## 2019-01-30 NOTE — HISTORY OF PRESENT ILLNESS
[FreeTextEntry1] : Ms. YAMILA WILLAMS is a very pleasant 63 year female who comes in for follow up evaluation of the lower back and bilateral buttocks and thighs after undergoing an GEO on 01/15/19. The patient reports approximately 75% improvement following the procedure at this time. There is minimal residual discomfort, which remains located in the low back no longer radiating to the legs intermittent in nature and described as dull ache. The pain is rated as 3/10 during today's visit, and ranges from 2-4/10. The patient's symptoms are aggravated mostly upon waking in the morning with stiffness/aching and alleviated by sitting/bending Advil and Tylenol . The patient has also significantly reduced the need for medication, both dose and frequency. The patient denies any night pain, numbness/tingling, weakness, or bowel/bladder dysfunction. The patient has no other complaints at this time.

## 2019-01-30 NOTE — REVIEW OF SYSTEMS
[Negative] : Allergic/Immunologic [Fever] : no fever [Chills] : no chills [Chest Pain] : no chest pain [Palpitations] : no palpitations [Abdominal Pain] : no abdominal pain [Vomiting] : no vomiting [Constipation] : no constipation [FreeTextEntry6] : SOB on exertion [FreeTextEntry9] : gait instability, bilaterally. pain to bilateral back of thighs, improved.  [de-identified] : patient with chronic neuropathy to bilateral feet. feels like "squeezing" and tingling

## 2019-01-30 NOTE — DATA REVIEWED
[FreeTextEntry1] : MR LS 12/2017: L4/L5 disc height loss with grade 1 anterolisthesis of L4 on L5 and bilateral facet arthrosis. There is moderate central canal stenosis, and severe bilateral foraminal narrowing. L5/S1 disc height loss with small disc bulge and bilateral facet arthrosis. \par \par MRI LS 1/2019: L4-5 Grade 1 anterolisthesis, severe bilateral facet hypertrophy, and ligamentum flavum hypertrophy, resultant severe canal stenosis, and bilateral foraminal stenoses with impingement of the exiting L4 nerve roots. L5-S1 moderate bilateral facet hypertrophy with ligamentum flavum hypertrophy. Interval development of a new well-defined round T2/STIR signal hyperintensities within the T12 pedicles bilaterally.

## 2019-01-30 NOTE — PHYSICAL EXAM
[General Appearance - Well Developed] : well developed [Sclera] : the sclera and conjunctiva were normal [PERRL With Normal Accommodation] : pupils were equal in size, round, reactive to light [Extraocular Movements] : extraocular movements were intact [Outer Ear] : the ears and nose were normal in appearance [Hearing Threshold Finger Rub Not [place]] : hearing was normal [Examination Of The Oral Cavity] : the lips and gums were normal [Nasal Cavity] : the nasal mucosa and septum were normal [Oropharynx] : The oropharynx was normal [Respiration, Rhythm And Depth] : normal respiratory rhythm and effort [Auscultation Breath Sounds / Voice Sounds] : lungs were clear to auscultation bilaterally [Musculoskeletal - Swelling] : no joint swelling [Motor Tone] : muscle strength and tone were normal [Skin Color & Pigmentation] : normal skin color and pigmentation [No Focal Deficits] : no focal deficits [Oriented To Time, Place, And Person] : oriented to person, place, and time [General Appearance - Alert] : alert [General Appearance - In No Acute Distress] : in no acute distress [Normal] : normal heart rate and rhythm, normal S1 and S2, and no murmurs present [Motor Exam] : the motor exam was normal [de-identified] : strength 5/5 bilateral upper and lower extremities, no tenderness upon palpation of spine. [de-identified] : no dermatitis [de-identified] : Sensation intact to PP and LT throughout. MSR 1+ and symmetric patella.

## 2019-01-30 NOTE — VITALS
[Least Pain Intensity: 0/10] : 0/10 [Pain Location: ___] : Pain Location: [unfilled] [90: Able to carry normal activity; minor signs or symptoms of disease.] : 90: Able to carry normal activity; minor signs or symptoms of disease.  [100: Fully active, normal.] : 100: Fully active, normal. [ECOG Performance Status: 1 - Restricted in physically strenuous activity but ambulatory and able to carry out work of a light or sedentary nature] : Performance Status: 1 - Restricted in physically strenuous activity but ambulatory and able to carry out work of a light or sedentary nature, e.g., light house work, office work [Maximal Pain Intensity: 3/10] : 3/10

## 2019-01-30 NOTE — ASSESSMENT
[FreeTextEntry1] : Impression:\par 1. Lumbar Spinal Stenosis - improved \par \par Plan: The patient has responded very well to the epidural steroid injection, reporting approximately 75% improvement in symptoms. Review of the history, physical examination, and imaging, the patient's symptoms remain consistent with Lumbar Spinal Stenosis with claudication. The imaging results and diagnosis were reviewed with the patient. Abnormal hyperintensity findings were discussed with the patient and communicated to Dr. Arellano. We reviewed potential treatment options including physical therapy, oral medication, interventional spine procedures, and surgery; as well as alternative therapeutics such as acupuncture and massage. We also reviewed the importance of weight loss, ergonomics, and posture in the long term management of the condition. At this time I have given the patient a referral for PT. We emphasized the importance of the home exercise program. The patient expressed verbal understanding and is in agreement with the plan of care. All of the patient's questions and concerns were addressed during today's visit.

## 2019-01-30 NOTE — HISTORY OF PRESENT ILLNESS
[FreeTextEntry1] : Ms. Omalley completed radiation therapy to the left sacrum for a dose of 2400 cGy over 3 treatments from 1/29/18- 2/2/18. She tolerated treatment well.\par \par 1/30/19 - FOLLOW UP\par Ms. Omalley presents for routine follow up. When last seen on 9/4/18, she was referred to Dr. King for evaluation of sacral pain radiating to back of thighs, as well as encouraged to see pain specialist. She saw Dr. King on 9/6/18 who recommended starting Meloxicam, continuing Gabapentin, and starting PT. She started PT, which resulted in mild relief. She has been following up with her PCP Dr. Contreras as well. She saw Dr. King again on 12/27/18 because she wanted "an injection in her back." She was again advised to see pain specialist. She saw Dr. Harding, diagnosed with lumbar stenosis, received epidural steroid injection approx two weeks ago with significant improvement in pain (from 10/10 to at most 3/10). She is here today because when she received the injection under fluoroscopy, a "suspicious spot" was seen and she was advised to return here sooner. She last saw Med Onc Dr. Levi in November, and will follow up in April. Continues on Exemestane.\par \par She had MRI lumbar spine on 1/14/19 that showed the following:\par 1. Small well-defined T2/STIR signal hyperintensity lesions within the T12 pedicles are new compared with the 2017 thoracolumbar spine MRI exams. Given the patient's history and a small focus of left pedicle uptake on the August 2018 PET/CT, these are worrisome for metastatic lesions.\par 2. Mild central compression deformity of the T12 body secondary to depression of the T12 endplate. This is most likely on a degenerative basis given presence of a new T11-T12 disc bulge and probable Schmorl's node.\par 3. New small disc bulge at T11-T12 with mild canal and mild bilateral foraminal narrowing.\par 4. Unchanged spondylosis at L3-4, L4-5, and L5-S1, again worst at L4-5 with severe canal and bilateral foraminal stenoses with impingement of the exiting L4 nerve roots.\par \par Today, she reports she feels well. Denies any pain at this time, but at its worst it can reach 3/10. Located in bilateral posterior thighs and sacral area, intermittent and described as dull and aching. She will be restarting PT in the next week as it was mildly helpful in the past. She has decreased significantly the amount of Tylenol and Motrin she is taking. She otherwise denies any other symptoms to include fever, chills, CP, SOB, weakness, sensory or motor abnormalities, bowel or bladder dysfunction.\par \par \par 9/4/18- FOLLOW UP\par Ms. Omalley returns for routine follow up. At previous visit on 5/23, plan was to repeat PET scan in 4- 6 months, and she was advised to follow up with neurology for leg pain. She also had a colonoscopy shortly after previous visit on 5/31, which was negative for malignancy. She had PET/ CT on 8/29/18 that showed the following:\par \par 1. Increased focal uptake corresponding to a developing sclerosis at the site of the previously seen left sacral metastasis.  This most likely represents persistent viable malignancy, especially given completion of radiation therapy at least 7 months prior.  Otherwise, there is no suspicious evidence to suggest other sites of active or treated metastatic disease.\par 2.  Interval resolution of uptake and interval healing of the left lateral third and fourth ribs, most likely posttraumatic.\par 3.  No evidence to suggest marginal or regional jose cruz recurrence of previously treated right breast or tongue malignancies.\par 4.  No suspicious nodularity at the margins of right upper lobectomy.\par 5.  Otherwise stable PET/CT evaluation of the rest of the soft tissue structures, including a 6 mm groundglass nodule in the anterior right lower lobe lung.\par \par Today, she said she is doing 'ok' except for unrelenting sacral pain which varies between a 9-10. She is taking gabapentin as ordered and gets minimal relief from the pain. Ambulating with cane - gait steady with use of assistive device. Patient states she has significantly reduced her alcohol intake and is in a treatment program through [facility]. \par \par \par 5/23/18- Ms. Omalley presents today for routine follow up. She underwent a PET scan on 3/28/18. THis showed focal hypermetabolic activity in the sacrum, which did not correspond with any definite abnormality on CT images. Hypermetabolic activity in fractures of the left third-5th rib, nonspecific but favoring a posttraumatic injury. Probable postradiation change in right lung. Otherwise no abnormal activity to suggest malignancy.\par \par Today Ms. Omalley feels overall well. She is accompanied by her new home assistant, hired by her son. She still has pain to the bilateral back of her legs. She had increased LFTs recently, and had a liver ultrasound. She will have a colonoscopy next week, and  will see Dr. Levi and her primary care physician both in june. \par She is still with impaired gait, and is bothered by neuropathy to bilateral feet. \par Denies pain to back.\par \par Oncologic history:\par In approximately 2014 she was diagnosed with a locally advanced breast cancer. She initially saw Dr. Dobson in consult on 10/26/17. I do not have her staging details, but am told she was treated with breast conservation therapy followed by chemotherapy and adjuvant radiation therapy.  She was thereafter maintained on an AI.  Follow up imaging has all been negative.  Her oncology team is at [facility]. \par \par In September 2016, she presented to Dr. Luna after having identified a right lateral tongue lesion noted by a dentist. She underwent partial resection of lateral tongue lesion on 9/14/16.  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  It was predominantly non-keratizing with maximal thickness of 1 mm.   She was suggested further surgical resection but when extent of disease imaging identified a lung mass, further surgery was deferred.  PET scan 10/3/16 which revealed increased activity in  the right aspect of the tongue compatible with post surgical changes. There was additionally a suspicious right apical lung nodule of 2.4 cm with concerns for a second primary. She underwent a  right upper lobe wedge resection with Dr. Foreman on 11/2/16 which revealed squamous cell carcinoma, moderately differentiated with keratinization. 3 lymph nodes negative. The pathology was reviewed at [facility], where it was felt that it was unlikely the lung cancer and the oral tongue cancer were related due to difference in keratinization, which would be consistent with the clinical presentation.  She then underwent right upper lobe completion lobectomy on 12/14/16, 9 lymph nodes negative. No residual carcinoma. Margins negative. Stage pT1a, N0. She subsequently underwent further tongue resection surgery on 2/2/17. Right lateral oral tongue was benign, margins negative. \par \par More recently, she had  complains of bilateral  posterior thigh and leg pain as well upper extremity joint pain x 6 months, mostly in the bilateral hands, worst in the morning, improved with motion.  Of note she has had difficulty with neuropathy since completion of her chemotherapy.  She was seen by Dr. Vieira. An MRI [facility]  in April 2017 showed no evidence of tumoral disease. There were postsurgical changes present. A PET scan on 6/14/17 showed no evidence of residual, recurrent, or metastatic disease. MRI cervical spine on 10/18/17 showed suspicion for bony mets involving C4-C6 as well as cervical spondylosis with spinal stenosis. \par Specifically, there was T1 hypointense signal involving the left side of C6 vertebral body extending into the left pedicle concerning for marrow infiltration as well as smaller focus seen within the left C4 vertebral body.  She denies any neck pain or acute changes, states symptoms have been constant over the past 6 months.  She denies any new motor or sensory changes. No change in bowel or bladder or perianal numbness. Recent labs show an elevated ESR. EMG studies 10/3/17 show an abnormal study with electrophysiologic evidence of a sensorimotor axonal large-fiber polyneuropathy, unchanged. No evidence of radiculopathy, plexopathy, myopathy, or other focal neuropathy.  She  takes advil daily which helps with joint pain in her hands. Denies headaches, neck pain, incontinence. Reports  several falls related to her neuropathy where  she loses her balance. Her last fall was in May 2017 where she broke some ribs and was hospitalized x 4 days.   Her case was reviewed at the spine tumor board and with radiology and it was determined the  findings were likely degenerative. A plan was made to  f/u and PET scan in 1 month. \par \par 12/18/17- Follow up\par Ms. Omalley is a 62 year old female who was seen in consultation on 10/26/17 for questionable bone metastasis. \par MRI  cervical spine 10/18/17 was  suspicious for bony bone metastasis involving C4 and C6 vertebral bodies.\par Today she c/o of bilateral posterior thigh pain 7/10. Partially relieved with advil 2 tabs to 4/10. This pain has been ongoing for at least 6 months.  She has discontinued her statin 1 week prior per recommendations of med onc to see if this may be associated with her symptoms.  \par \par PET/CT SKUL-THI 11/29/17 \par 1-area of increased FDG activity in the left side of the sacrum, with a maximum SUV of 11.6 and subtle underlying sclerosis, suspicious for metastatic lesion given he history of cancer. Additionally, there was increased FDG activtiy in the left thyroid lobe, largely replaced by a nodule with mild FDG activity.There was a small area of increased activity in the C6 vertebral body, felt to be degenerative in nature. 1cm partly solid partly groundglass density in the anterior basal segment of the right lower lobe extending into the major fissure without FDG activity.\par \par MRI cervical spine w/wo contrast 12/14/17\par 1-extensive bone marrow edema signal within the C6 and to a lesser degree C5 and C& vertebral bodies is likely degenerative\par 2-disc bulges and left paracentral disc protrusions at C4/C5 and C5/C6 exert mass effect on the left hemicord without abnormal cord signal, contribute to moderate central canal stenosis, and severe bilateral foraminal narrowing\par 3-advanced left facet arthrosi at C5/C6 with moderate reactive bone marrow edema signal on both sides of the facet joint\par 4-mild degenerative anterolisthesis at C4/C5\par 5-enlarged and heterogeneous left thyroid lobe\par \par Ms. Omalley was recommended for sacral biopsy based on these results, and results were consistent with breast primary on 12/20/17. She was advised follow up with her medical oncologist, and was referred back for follow up with me today to discuss SBRT to the sacral lesion.\par \par 1/10/18- Ms. Omalley presents today for follow up. \par She underwent biopsy of a sacral lesion on 12/20/18. Pathology was consistent with metastatic breast carcinoma. \par Today she notes the pain in the bilateral posterior thighs is much improved. It was previously a 6-7/10 at worst, and now is a 2-3/10 at worst. She was recommended to stop exemestane by her oncologist to see if this helped the pain, however she had no improvement and re-started the exemestane. She was recommended to stop lipitor recently, and has had significant improvement in her pain since then. \par Pain is better toward the end of the day when she moves more. \par She denies numbness and tingling in upper or lower extremities. Denies weakness/tingling in upper extremities. No bowel/bladder changes.  Notes she sometimes has difficulty walking due to pain in bilateral thighs and mid sacrum, left greater than right. \par SHe will see her Medical oncologist today, Dr. Levi at [facility]\par \par 2/28/18-Ms. Omalley presents today for post treatment evaluation. She completed radiation therapy to the left sacrum for a dose of 2400 cGy over 3 treatments from 1/29/18-2/2/18. She tolerated treatment well. She presents today for post treatment evaluation. \par She had frequent falls during and after her treatment, ans has been following with her PCP Dr. Landry. \par Ms. Omalley notes today that she still has pain in her bilateral posterior thighs. This has returned over the last few weeks after initially improving when her statin was discontinued. She denies back pain. She has no darkening of the skin in her lower back. She denies muscle weakness. \par She has not had any falls since he last one at the end of January. She has cut down on her drinking since then. \par She was not approved for home care due tot he fact that she is not homebound\par She has an appointment with Dr. Levi next week.

## 2019-03-18 ENCOUNTER — TRANSCRIPTION ENCOUNTER (OUTPATIENT)
Age: 64
End: 2019-03-18

## 2019-03-25 ENCOUNTER — APPOINTMENT (OUTPATIENT)
Dept: INTERNAL MEDICINE | Facility: CLINIC | Age: 64
End: 2019-03-25
Payer: MEDICARE

## 2019-03-25 VITALS
SYSTOLIC BLOOD PRESSURE: 116 MMHG | WEIGHT: 176 LBS | BODY MASS INDEX: 26.07 KG/M2 | TEMPERATURE: 99.1 F | HEART RATE: 80 BPM | HEIGHT: 69 IN | OXYGEN SATURATION: 95 % | DIASTOLIC BLOOD PRESSURE: 82 MMHG

## 2019-03-25 DIAGNOSIS — M79.602 PAIN IN LEFT ARM: ICD-10-CM

## 2019-03-25 PROCEDURE — 99215 OFFICE O/P EST HI 40 MIN: CPT | Mod: 25

## 2019-03-25 PROCEDURE — 93000 ELECTROCARDIOGRAM COMPLETE: CPT

## 2019-04-03 ENCOUNTER — FORM ENCOUNTER (OUTPATIENT)
Age: 64
End: 2019-04-03

## 2019-04-04 ENCOUNTER — APPOINTMENT (OUTPATIENT)
Dept: NEPHROLOGY | Facility: CLINIC | Age: 64
End: 2019-04-04

## 2019-04-04 ENCOUNTER — OUTPATIENT (OUTPATIENT)
Dept: OUTPATIENT SERVICES | Facility: HOSPITAL | Age: 64
LOS: 1 days | End: 2019-04-04
Payer: MEDICARE

## 2019-04-04 DIAGNOSIS — Z98.890 OTHER SPECIFIED POSTPROCEDURAL STATES: Chronic | ICD-10-CM

## 2019-04-04 DIAGNOSIS — Z98.89 OTHER SPECIFIED POSTPROCEDURAL STATES: Chronic | ICD-10-CM

## 2019-04-04 DIAGNOSIS — Z41.9 ENCOUNTER FOR PROCEDURE FOR PURPOSES OTHER THAN REMEDYING HEALTH STATE, UNSPECIFIED: Chronic | ICD-10-CM

## 2019-04-04 PROCEDURE — 71046 X-RAY EXAM CHEST 2 VIEWS: CPT

## 2019-04-04 PROCEDURE — 71046 X-RAY EXAM CHEST 2 VIEWS: CPT | Mod: 26

## 2019-04-23 ENCOUNTER — FORM ENCOUNTER (OUTPATIENT)
Age: 64
End: 2019-04-23

## 2019-04-29 ENCOUNTER — FORM ENCOUNTER (OUTPATIENT)
Age: 64
End: 2019-04-29

## 2019-06-13 ENCOUNTER — FORM ENCOUNTER (OUTPATIENT)
Age: 64
End: 2019-06-13

## 2019-06-14 ENCOUNTER — OUTPATIENT (OUTPATIENT)
Dept: OUTPATIENT SERVICES | Facility: HOSPITAL | Age: 64
LOS: 1 days | End: 2019-06-14
Payer: MEDICARE

## 2019-06-14 ENCOUNTER — EMERGENCY (EMERGENCY)
Facility: HOSPITAL | Age: 64
LOS: 1 days | Discharge: HOME | End: 2019-06-14
Attending: EMERGENCY MEDICINE | Admitting: EMERGENCY MEDICINE
Payer: MEDICARE

## 2019-06-14 ENCOUNTER — APPOINTMENT (OUTPATIENT)
Dept: ULTRASOUND IMAGING | Facility: HOSPITAL | Age: 64
End: 2019-06-14

## 2019-06-14 ENCOUNTER — LABORATORY RESULT (OUTPATIENT)
Age: 64
End: 2019-06-14

## 2019-06-14 ENCOUNTER — APPOINTMENT (OUTPATIENT)
Dept: INTERNAL MEDICINE | Facility: CLINIC | Age: 64
End: 2019-06-14
Payer: MEDICARE

## 2019-06-14 VITALS
BODY MASS INDEX: 27.7 KG/M2 | SYSTOLIC BLOOD PRESSURE: 158 MMHG | DIASTOLIC BLOOD PRESSURE: 81 MMHG | HEART RATE: 73 BPM | OXYGEN SATURATION: 97 % | WEIGHT: 187 LBS | HEIGHT: 69 IN | TEMPERATURE: 98.4 F

## 2019-06-14 VITALS
HEART RATE: 77 BPM | OXYGEN SATURATION: 100 % | RESPIRATION RATE: 18 BRPM | DIASTOLIC BLOOD PRESSURE: 79 MMHG | WEIGHT: 188.05 LBS | SYSTOLIC BLOOD PRESSURE: 149 MMHG | TEMPERATURE: 99 F

## 2019-06-14 VITALS
TEMPERATURE: 98 F | SYSTOLIC BLOOD PRESSURE: 154 MMHG | HEART RATE: 77 BPM | DIASTOLIC BLOOD PRESSURE: 77 MMHG | OXYGEN SATURATION: 98 % | RESPIRATION RATE: 18 BRPM

## 2019-06-14 DIAGNOSIS — I82.432 ACUTE EMBOLISM AND THROMBOSIS OF LEFT POPLITEAL VEIN: ICD-10-CM

## 2019-06-14 DIAGNOSIS — M79.661 PAIN IN RIGHT LOWER LEG: ICD-10-CM

## 2019-06-14 DIAGNOSIS — I10 ESSENTIAL (PRIMARY) HYPERTENSION: ICD-10-CM

## 2019-06-14 DIAGNOSIS — Z98.890 OTHER SPECIFIED POSTPROCEDURAL STATES: Chronic | ICD-10-CM

## 2019-06-14 DIAGNOSIS — Z79.52 LONG TERM (CURRENT) USE OF SYSTEMIC STEROIDS: ICD-10-CM

## 2019-06-14 DIAGNOSIS — Z98.89 OTHER SPECIFIED POSTPROCEDURAL STATES: Chronic | ICD-10-CM

## 2019-06-14 DIAGNOSIS — Z41.9 ENCOUNTER FOR PROCEDURE FOR PURPOSES OTHER THAN REMEDYING HEALTH STATE, UNSPECIFIED: Chronic | ICD-10-CM

## 2019-06-14 DIAGNOSIS — Z79.899 OTHER LONG TERM (CURRENT) DRUG THERAPY: ICD-10-CM

## 2019-06-14 DIAGNOSIS — R06.2 WHEEZING: ICD-10-CM

## 2019-06-14 DIAGNOSIS — E78.00 PURE HYPERCHOLESTEROLEMIA, UNSPECIFIED: ICD-10-CM

## 2019-06-14 DIAGNOSIS — R73.03 PREDIABETES.: ICD-10-CM

## 2019-06-14 DIAGNOSIS — J44.9 CHRONIC OBSTRUCTIVE PULMONARY DISEASE, UNSPECIFIED: ICD-10-CM

## 2019-06-14 DIAGNOSIS — Z79.51 LONG TERM (CURRENT) USE OF INHALED STEROIDS: ICD-10-CM

## 2019-06-14 LAB
ALBUMIN SERPL ELPH-MCNC: 3.8 G/DL — SIGNIFICANT CHANGE UP (ref 3.3–5)
ALP SERPL-CCNC: 122 U/L — HIGH (ref 40–120)
ALT FLD-CCNC: 16 U/L — SIGNIFICANT CHANGE UP (ref 10–45)
ANION GAP SERPL CALC-SCNC: 11 MMOL/L — SIGNIFICANT CHANGE UP (ref 5–17)
APTT BLD: 29.9 SEC — SIGNIFICANT CHANGE UP (ref 27.5–36.3)
AST SERPL-CCNC: 40 U/L — SIGNIFICANT CHANGE UP (ref 10–40)
BASOPHILS # BLD AUTO: 0.27 K/UL — HIGH (ref 0–0.2)
BASOPHILS NFR BLD AUTO: 3 % — HIGH (ref 0–2)
BILIRUB SERPL-MCNC: 0.6 MG/DL — SIGNIFICANT CHANGE UP (ref 0.2–1.2)
BUN SERPL-MCNC: 6 MG/DL — LOW (ref 7–23)
CALCIUM SERPL-MCNC: 10.2 MG/DL — SIGNIFICANT CHANGE UP (ref 8.4–10.5)
CHLORIDE SERPL-SCNC: 105 MMOL/L — SIGNIFICANT CHANGE UP (ref 96–108)
CO2 SERPL-SCNC: 31 MMOL/L — SIGNIFICANT CHANGE UP (ref 22–31)
CREAT SERPL-MCNC: 0.53 MG/DL — SIGNIFICANT CHANGE UP (ref 0.5–1.3)
EOSINOPHIL # BLD AUTO: 0.18 K/UL — SIGNIFICANT CHANGE UP (ref 0–0.5)
EOSINOPHIL NFR BLD AUTO: 2 % — SIGNIFICANT CHANGE UP (ref 0–6)
GLUCOSE SERPL-MCNC: 111 MG/DL — HIGH (ref 70–99)
HCT VFR BLD CALC: 45.1 % — HIGH (ref 34.5–45)
HGB BLD-MCNC: 14.7 G/DL — SIGNIFICANT CHANGE UP (ref 11.5–15.5)
INR BLD: 1 — SIGNIFICANT CHANGE UP (ref 0.88–1.16)
LYMPHOCYTES # BLD AUTO: 1.51 K/UL — SIGNIFICANT CHANGE UP (ref 1–3.3)
LYMPHOCYTES # BLD AUTO: 17 % — SIGNIFICANT CHANGE UP (ref 13–44)
MCHC RBC-ENTMCNC: 32.6 GM/DL — SIGNIFICANT CHANGE UP (ref 32–36)
MCHC RBC-ENTMCNC: 36.3 PG — HIGH (ref 27–34)
MCV RBC AUTO: 111.4 FL — HIGH (ref 80–100)
MONOCYTES # BLD AUTO: 0.71 K/UL — SIGNIFICANT CHANGE UP (ref 0–0.9)
MONOCYTES NFR BLD AUTO: 8 % — SIGNIFICANT CHANGE UP (ref 2–14)
NEUTROPHILS # BLD AUTO: 6.12 K/UL — SIGNIFICANT CHANGE UP (ref 1.8–7.4)
NEUTROPHILS NFR BLD AUTO: 69 % — SIGNIFICANT CHANGE UP (ref 43–77)
NRBC # BLD: SIGNIFICANT CHANGE UP /100 WBCS (ref 0–0)
PLATELET # BLD AUTO: 330 K/UL — SIGNIFICANT CHANGE UP (ref 150–400)
POTASSIUM SERPL-MCNC: 4.2 MMOL/L — SIGNIFICANT CHANGE UP (ref 3.5–5.3)
POTASSIUM SERPL-SCNC: 4.2 MMOL/L — SIGNIFICANT CHANGE UP (ref 3.5–5.3)
PROT SERPL-MCNC: 7.8 G/DL — SIGNIFICANT CHANGE UP (ref 6–8.3)
PROTHROM AB SERPL-ACNC: 11.3 SEC — SIGNIFICANT CHANGE UP (ref 10–12.9)
RBC # BLD: 4.05 M/UL — SIGNIFICANT CHANGE UP (ref 3.8–5.2)
RBC # FLD: 15.5 % — HIGH (ref 10.3–14.5)
SODIUM SERPL-SCNC: 147 MMOL/L — HIGH (ref 135–145)
WBC # BLD: 8.87 K/UL — SIGNIFICANT CHANGE UP (ref 3.8–10.5)
WBC # FLD AUTO: 8.87 K/UL — SIGNIFICANT CHANGE UP (ref 3.8–10.5)

## 2019-06-14 PROCEDURE — 99284 EMERGENCY DEPT VISIT MOD MDM: CPT

## 2019-06-14 PROCEDURE — 93970 EXTREMITY STUDY: CPT | Mod: 26

## 2019-06-14 PROCEDURE — 93970 EXTREMITY STUDY: CPT

## 2019-06-14 PROCEDURE — 85730 THROMBOPLASTIN TIME PARTIAL: CPT

## 2019-06-14 PROCEDURE — 85610 PROTHROMBIN TIME: CPT

## 2019-06-14 PROCEDURE — 85025 COMPLETE CBC W/AUTO DIFF WBC: CPT

## 2019-06-14 PROCEDURE — 96372 THER/PROPH/DIAG INJ SC/IM: CPT

## 2019-06-14 PROCEDURE — 36415 COLL VENOUS BLD VENIPUNCTURE: CPT

## 2019-06-14 PROCEDURE — 99215 OFFICE O/P EST HI 40 MIN: CPT | Mod: 25

## 2019-06-14 PROCEDURE — 80053 COMPREHEN METABOLIC PANEL: CPT

## 2019-06-14 PROCEDURE — 99283 EMERGENCY DEPT VISIT LOW MDM: CPT | Mod: 25

## 2019-06-14 RX ORDER — ENOXAPARIN SODIUM 100 MG/ML
80 INJECTION SUBCUTANEOUS
Qty: 42 | Refills: 0
Start: 2019-06-14 | End: 2019-07-04

## 2019-06-14 RX ORDER — ENOXAPARIN SODIUM 100 MG/ML
85 INJECTION SUBCUTANEOUS ONCE
Refills: 0 | Status: COMPLETED | OUTPATIENT
Start: 2019-06-14 | End: 2019-06-14

## 2019-06-14 RX ADMIN — ENOXAPARIN SODIUM 85 MILLIGRAM(S): 100 INJECTION SUBCUTANEOUS at 18:24

## 2019-06-14 NOTE — ED PROVIDER NOTE - PROGRESS NOTE DETAILS
tried to call pts oncologist at [facility] but her answering service is not working. spoke with heme/onc fellow who recommends lovenox for DVT treatment for active cancer pts, can bridge to edoxaban. Spoke with pts pmd Dr. Contreras who recommends lovenox BID and will see pt on Monday and talk with her oncologist about switching to oralAC. Will do lovenox teaching with pt

## 2019-06-14 NOTE — ED PROVIDER NOTE - ATTENDING CONTRIBUTION TO CARE
Pt w/ PMHx of COPD, asthma, RUL lobectomy, breast ca 2014 s/p R lumpectomy, chemo and XRT recently found to have metastases to spine (starting injections) sent from outpt radiology for + LLE DVT on b/l LE doppler. Pt states on 5/8 she had a fall at home (no fractures) but was admitted to [facility] for 1 week. Pt was then in rehab for 1 week. Pt was discharged ambulating with a walker. Pt states since her fall both of her legs have been swollen. Pt tried wearing compression stockings but they were uncomfortable. Pt saw her PMD Dr ARMANDO Contreras today, and was sent for a b/l LE doppler. Denies fever, chills, cp, sob, numbness, tingling.  US report: Nonocclusive venous thrombosis of the left popliteal vein. Occlusive venous thrombosis of an intramuscular calf vein.  Constitutional: Well appearing, well nourished, awake, alert, oriented to person, place, time/situation and in no apparent distress.  ENMT: Airway patent. Normal MM  Eyes: Clear bilaterally  Cardiac: Normal rate, regular rhythm.  Heart sounds S1, S2.  No murmurs, rubs or gallops.  Respiratory: Good aeration, mild scattered wheezing. No increased WOB, tachypnea, hypoxia, or accessory mm use. Pt speaks in full sentences.   Musculoskeletal: Range of motion is not limited. + b/l LE edema, L mildly larger than R. No calf ttp. NVI  Neuro: Alert and oriented x 3, face symmetric and speech fluent. Strength 5/5 x 4 ext and symmetric, nml gross motor movement, nml gait. No focal deficits noted.  Skin: Skin normal color for race, warm, dry and intact. No evidence of rash.  Psych: Alert and oriented to person, place, time/situation. normal mood and affect. no apparent risk to self or others.   + DVT in active CA pt. Start Lovenox. RN to teach to pt. Close f/u PCP / onc

## 2019-06-14 NOTE — ED ADULT NURSE NOTE - OBJECTIVE STATEMENT
Patient alert and oriented x 3 came from ultrasound [facility] for confirmed DVT . Pt. been having bilateral leg swelling for 3 weeks with pain . Went to PMD this am had blood work done . Denies any sob when walking ,. no chest pain , fever nor chills . In stable condition . Seen and examined by ANNABEL ellis , will continue to monitor .

## 2019-06-14 NOTE — ED PROVIDER NOTE - CARE PROVIDER_API CALL
Armando Contreras)  Internal Medicine  [address] [place] [address] [id number]  Phone: [phone]  Fax: [phone]  Follow Up Time:

## 2019-06-14 NOTE — ED PROVIDER NOTE - PHYSICAL EXAMINATION
CONSTITUTIONAL: Well-appearing; well-nourished; in no apparent distress.   HEAD: Normocephalic; atraumatic.   EYES: PERRL; EOM intact; conjunctiva and sclera clear  ENT: normal nose; no rhinorrhea; normal pharynx with no erythema or lesions.   NECK: Supple; non-tender; no LAD  CARDIOVASCULAR: Normal S1, S2; no murmurs, rubs, or gallops. Regular rate and rhythm.   RESPIRATORY: Breathing easily; breath sounds clear and equal bilaterally; no wheezes, rhonchi, or rales.  GI: Soft; non-distended; non-tender; no palpable organomegaly.   MSK: FROM at all extremities, normal tone   EXT: +b/l 2+ pitting edema, no calf tenderness, no erythema or warmth, DP pulse 2+ b/l   SKIN: Normal for age and race; warm; dry; good turgor; no apparent lesions or rash.   NEURO: A & O x 3; face symmetric; grossly unremarkable.   PSYCHOLOGICAL: The patient’s mood and manner are appropriate.

## 2019-06-14 NOTE — ED PROVIDER NOTE - OBJECTIVE STATEMENT
65 yo F with pmh of COPD, asthma, RUL lobectomy, breast ca 2014 s/p R lumpectomy, chemo and XRT recently found to have metastases to spine (starting injections) sent from radiology for LLE DVT. Pt states on 5/8 she had a fall at home (no fractures) but was admitted to [facility] for 1 week. Pt was then in rehab for 1 week. Pt was discharged ambulating with a walker. Pt states since her fall both of her legs have been swollen. Pt tried wearing compression stockings but they were uncomfortable. pt saw her pmd today and was sent for a b/l LE doppler. Denies fever, chills, cp, sob, numbness, tingling.

## 2019-06-14 NOTE — ED PROVIDER NOTE - CLINICAL SUMMARY MEDICAL DECISION MAKING FREE TEXT BOX
63 yo F with pmh of COPD, asthma, RUL lobectomy, breast ca 2014 s/p R lumpectomy, chemo and XRT recently found to have metastases to spine (starting injections) sent from radiology for LLE DVT.  +b/l 2+ pitting edema, no calf tenderness, no erythema or warmth, DP pulse 2+ b/l 65 yo F with pmh of COPD, asthma, RUL lobectomy, breast ca 2014 s/p R lumpectomy, chemo and XRT recently found to have metastases to spine (starting injections) sent from radiology for LLE DVT.  +b/l 2+ pitting edema, no calf tenderness, no erythema or warmth, DP pulse 2+ b/l. Will check labs, start anticoagulation.

## 2019-06-14 NOTE — ED ADULT NURSE NOTE - CHPI ED NUR SYMPTOMS NEG
no stiffness/no difficulty bearing weight/no abrasion/no weakness/no deformity/no back pain/no bruising/no fever

## 2019-06-14 NOTE — ED PROVIDER NOTE - NSFOLLOWUPINSTRUCTIONS_ED_ALL_ED_FT
Deep Vein Thrombosis    A deep vein thrombosis (DVT) is a blood clot (thrombus) that usually occurs in a deep, larger vein of the lower leg or the pelvis, or in an upper extremity such as the arm. These are dangerous and can lead to serious and even life-threatening complications if the clot travels to the lungs. Symptoms include swelling of the arm or leg, warmth and redness of the arm or leg, and pain. Treatment may include taking a blood thinning medication; make sure to take anything prescribed as instructed.    SEEK IMMEDIATE MEDICAL CARE IF YOU HAVE ANY OF THE FOLLOWING SYMPTOMS: shortness of breath, chest pain, rapid or irregular heartbeat, lightheadedness/dizziness, coughing up blood, or any bleeding in your vomit, stool, or urine. These symptoms may represent a serious problem that is an emergency. Do not wait to see if the symptoms will go away. Call 911 and do not drive yourself to the hospital. Follow up with Dr. Contreras on Monday 6/17/19 at 4:20pm.   Stop taking your ibuprofen or motrin   Give yourself lovenox injections 2 times per day.       Deep Vein Thrombosis    A deep vein thrombosis (DVT) is a blood clot (thrombus) that usually occurs in a deep, larger vein of the lower leg or the pelvis, or in an upper extremity such as the arm. These are dangerous and can lead to serious and even life-threatening complications if the clot travels to the lungs. Symptoms include swelling of the arm or leg, warmth and redness of the arm or leg, and pain. Treatment may include taking a blood thinning medication; make sure to take anything prescribed as instructed.    SEEK IMMEDIATE MEDICAL CARE IF YOU HAVE ANY OF THE FOLLOWING SYMPTOMS: shortness of breath, chest pain, rapid or irregular heartbeat, lightheadedness/dizziness, coughing up blood, or any bleeding in your vomit, stool, or urine. These symptoms may represent a serious problem that is an emergency. Do not wait to see if the symptoms will go away. Call 911 and do not drive yourself to the hospital.

## 2019-06-16 ENCOUNTER — INBOUND DOCUMENT (OUTPATIENT)
Age: 64
End: 2019-06-16

## 2019-06-16 DIAGNOSIS — D75.89 OTHER SPECIFIED DISEASES OF BLOOD AND BLOOD-FORMING ORGANS: ICD-10-CM

## 2019-06-16 LAB
ALBUMIN SERPL ELPH-MCNC: 3.8 G/DL
ALP BLD-CCNC: 121 U/L
ALT SERPL-CCNC: 13 U/L
ANION GAP SERPL CALC-SCNC: 24 MMOL/L
AST SERPL-CCNC: 30 U/L
BASOPHILS # BLD AUTO: 0.06 K/UL
BASOPHILS NFR BLD AUTO: 0.9 %
BILIRUB SERPL-MCNC: 0.5 MG/DL
BUN SERPL-MCNC: 6 MG/DL
CALCIUM SERPL-MCNC: 9.9 MG/DL
CHLORIDE SERPL-SCNC: 103 MMOL/L
CO2 SERPL-SCNC: 20 MMOL/L
CREAT SERPL-MCNC: 0.53 MG/DL
EOSINOPHIL # BLD AUTO: 0.06 K/UL
EOSINOPHIL NFR BLD AUTO: 0.9 %
ESTIMATED AVERAGE GLUCOSE: 108 MG/DL
GLUCOSE SERPL-MCNC: 171 MG/DL
HBA1C MFR BLD HPLC: 5.4 %
HCT VFR BLD CALC: 46.1 %
HGB BLD-MCNC: 14.5 G/DL
LYMPHOCYTES # BLD AUTO: 1.15 K/UL
LYMPHOCYTES NFR BLD AUTO: 16.2 %
MAGNESIUM SERPL-MCNC: 1.5 MG/DL
MAN DIFF?: NORMAL
MCHC RBC-ENTMCNC: 31.5 GM/DL
MCHC RBC-ENTMCNC: 36 PG
MCV RBC AUTO: 114.4 FL
MONOCYTES # BLD AUTO: 0.48 K/UL
MONOCYTES NFR BLD AUTO: 6.8 %
NEUTROPHILS # BLD AUTO: 5.35 K/UL
NEUTROPHILS NFR BLD AUTO: 75.2 %
NT-PROBNP SERPL-MCNC: 344 PG/ML
PLATELET # BLD AUTO: 340 K/UL
POTASSIUM SERPL-SCNC: 3.4 MMOL/L
PROT SERPL-MCNC: 7.2 G/DL
RBC # BLD: 4.03 M/UL
RBC # FLD: 16.2 %
SODIUM SERPL-SCNC: 147 MMOL/L
TSH SERPL-ACNC: 0.74 UIU/ML
WBC # FLD AUTO: 7.11 K/UL

## 2019-06-17 ENCOUNTER — APPOINTMENT (OUTPATIENT)
Dept: INTERNAL MEDICINE | Facility: CLINIC | Age: 64
End: 2019-06-17
Payer: MEDICARE

## 2019-06-17 VITALS — SYSTOLIC BLOOD PRESSURE: 120 MMHG | HEART RATE: 86 BPM | OXYGEN SATURATION: 98 % | DIASTOLIC BLOOD PRESSURE: 82 MMHG

## 2019-06-17 PROCEDURE — 99214 OFFICE O/P EST MOD 30 MIN: CPT

## 2019-06-24 ENCOUNTER — APPOINTMENT (OUTPATIENT)
Dept: INTERNAL MEDICINE | Facility: CLINIC | Age: 64
End: 2019-06-24

## 2019-06-27 NOTE — REASON FOR VISIT
[Bone Metastasis] : bone metastasis [Routine Follow-Up] : routine follow-up visit for [Other: _____] : [unfilled]

## 2019-07-03 ENCOUNTER — APPOINTMENT (OUTPATIENT)
Dept: RADIATION ONCOLOGY | Facility: CLINIC | Age: 64
End: 2019-07-03
Payer: MEDICARE

## 2019-07-03 VITALS
DIASTOLIC BLOOD PRESSURE: 84 MMHG | WEIGHT: 185 LBS | HEART RATE: 76 BPM | RESPIRATION RATE: 18 BRPM | BODY MASS INDEX: 27.32 KG/M2 | SYSTOLIC BLOOD PRESSURE: 138 MMHG | OXYGEN SATURATION: 99 %

## 2019-07-03 PROCEDURE — 99214 OFFICE O/P EST MOD 30 MIN: CPT

## 2019-07-03 RX ORDER — PALBOCICLIB 125 MG/1
CAPSULE ORAL
Refills: 0 | Status: ACTIVE | COMMUNITY

## 2019-07-03 RX ORDER — FULVESTRANT 50 MG/ML
INJECTION INTRAMUSCULAR
Refills: 0 | Status: ACTIVE | COMMUNITY

## 2019-07-03 NOTE — VITALS
[Least Pain Intensity: 0/10] : 0/10 [90: Able to carry normal activity; minor signs or symptoms of disease.] : 90: Able to carry normal activity; minor signs or symptoms of disease.  [ECOG Performance Status: 1 - Restricted in physically strenuous activity but ambulatory and able to carry out work of a light or sedentary nature] : Performance Status: 1 - Restricted in physically strenuous activity but ambulatory and able to carry out work of a light or sedentary nature, e.g., light house work, office work [Maximal Pain Intensity: 0/10] : 0/10

## 2019-07-05 NOTE — REVIEW OF SYSTEMS
[Negative] : Allergic/Immunologic [Chills] : no chills [Fever] : no fever [Palpitations] : no palpitations [Chest Pain] : no chest pain [Abdominal Pain] : no abdominal pain [Constipation] : no constipation [Vomiting] : no vomiting [FreeTextEntry6] : SOB on exertion [FreeTextEntry9] : gait instability, bilaterally. pain to bilateral back of thighs, improved.  [de-identified] : patient with chronic neuropathy to bilateral feet. feels like "squeezing" and tingling

## 2019-07-05 NOTE — PHYSICAL EXAM
[Respiration, Rhythm And Depth] : normal respiratory rhythm and effort [Auscultation Breath Sounds / Voice Sounds] : lungs were clear to auscultation bilaterally [Musculoskeletal - Swelling] : no joint swelling [Motor Tone] : muscle strength and tone were normal [Motor Exam] : the motor exam was normal [Skin Color & Pigmentation] : normal skin color and pigmentation [No Focal Deficits] : no focal deficits [Oriented To Time, Place, And Person] : oriented to person, place, and time [Normal] : the ears, nose and oropharynx were normal in appearance [de-identified] : strength 5/5 BUE and BLE. [de-identified] : no dermatitis [de-identified] : Sensation intact to PP and LT throughout. Strength 5/5 BUE and BLE. MSR 1+ and symmetric patella.

## 2019-07-05 NOTE — DISEASE MANAGEMENT
[Pathological] : TNM Stage: p [IV] : IV [FreeTextEntry4] : Metastatic [TTNM] : X [NTNM] : X [MTNM] : X

## 2019-07-05 NOTE — HISTORY OF PRESENT ILLNESS
[FreeTextEntry1] : Ms. Omalley is a 64 year old woman with a history of breast cancer s/p adjuvant therapy including chemotherapy and AI, and early stage oral tongue and lung cancer.  She has  a  PMH/PSH of  right breast cancer in 2014  (treated with lumpectomy, chemo, and radiation therapy), early stage right lung cancer (treated with resection alone) in 2016, and early stage right tongue cancer in 2016/17 (treated with resection alone). She completed radiation treatment for a metastasis to the left sacrum for a dose of 2400 cGy, completed 2/2/18.\par \par 7/3/19- FOLLOW UP\par Ms. Omalley returns for routine follow up. When she was last sen on 1/30/19, she was feeling well with significant improvement in pain with epidural steroid injections. Plan was to continue follow up with Dr. Loja and Med Onc Dr. Levi, and to RTC 4 months. She last saw Dr. Levi last week. Exemestane was discontinued and she was started on Faslodex (first injection 6/24) and Ibrance. She may be starting Xgeva pending approval from her dentist. She follows up with PCP Dr. Contreras; last saw him on 6/17/19. She was hospitalized at [facility] in May due to dizziness resulting in a fall, then was sent to rehab until 5/31. While hospitalized, she had MRI of the spine, that reportedly showed degenerative changes (result not available at this time). \par \par PET/ CT done 4/24/19 as per Dr. Levi revealed the following: \par -Since 8/29/18 exam, anatomic and functional imaging findings consistent with definite progression of a previously treated left hemisacral metastatic focus, measuring 2.4 x 1.5 cm (prevously 1.1 x 1 cm). Increasing heterogenous uptake in the pelvis and spine is more nonspecific, given no obvious CT imaging correlates. However, very early multifocal osseous disease cannot be excluded.\par -No evidence to suggest marginal or regional jose cruz recurrence of the right breast malignancy.\par -Stable appearance of a 6 mm groundglass opacity in the anterior RLL and better visualization of a 6 mm groundglass opacity in the anterior LLL, nonspecific, but favoring a benign etiology. \par -Otherwise stable PET/ CT evaluation of the rest of the soft tissue structures. \par \par Today, she reports she is feeling well. Recently came back from a cruise with friends. She denies pain. She did have pain in her right knee (hurt her knee when she fell), but pain resolved. She denies bowel or bladder dysfunction, sensory or motor abnormalities, fever, chills, CP, SOB, weakness. \par \par \par 1/30/19 - FOLLOW UP\par Ms. Omalley presents for routine follow up. When last seen on 9/4/18, she was referred to Dr. King for evaluation of sacral pain radiating to back of thighs, as well as encouraged to see pain specialist. She saw Dr. King on 9/6/18 who recommended starting Meloxicam, continuing Gabapentin, and starting PT. She started PT, which resulted in mild relief. She has been following up with her PCP Dr. Contreras as well. She saw Dr. King again on 12/27/18 because she wanted "an injection in her back." She was again advised to see pain specialist. She saw Dr. Harding, diagnosed with lumbar stenosis, received epidural steroid injection approx two weeks ago with significant improvement in pain (from 10/10 to at most 3/10). She is here today because when she received the injection under fluoroscopy, a "suspicious spot" was seen and she was advised to return here sooner. She last saw Med Onc Dr. Levi in November, and will follow up in April. Continues on Exemestane.\par \par She had MRI lumbar spine on 1/14/19 that showed the following:\par 1. Small well-defined T2/STIR signal hyperintensity lesions within the T12 pedicles are new compared with the 2017 thoracolumbar spine MRI exams. Given the patient's history and a small focus of left pedicle uptake on the August 2018 PET/CT, these are worrisome for metastatic lesions.\par 2. Mild central compression deformity of the T12 body secondary to depression of the T12 endplate. This is most likely on a degenerative basis given presence of a new T11-T12 disc bulge and probable Schmorl's node.\par 3. New small disc bulge at T11-T12 with mild canal and mild bilateral foraminal narrowing.\par 4. Unchanged spondylosis at L3-4, L4-5, and L5-S1, again worst at L4-5 with severe canal and bilateral foraminal stenoses with impingement of the exiting L4 nerve roots.\par \par Today, she reports she feels well. Denies any pain at this time, but at its worst it can reach 3/10. Located in bilateral posterior thighs and sacral area, intermittent and described as dull and aching. She will be restarting PT in the next week as it was mildly helpful in the past. She has decreased significantly the amount of Tylenol and Motrin she is taking. She otherwise denies any other symptoms to include fever, chills, CP, SOB, weakness, sensory or motor abnormalities, bowel or bladder dysfunction.\par \par \par 9/4/18- FOLLOW UP\par Ms. Omalley returns for routine follow up. At previous visit on 5/23, plan was to repeat PET scan in 4- 6 months, and she was advised to follow up with neurology for leg pain. She also had a colonoscopy shortly after previous visit on 5/31, which was negative for malignancy. She had PET/ CT on 8/29/18 that showed the following:\par \par 1. Increased focal uptake corresponding to a developing sclerosis at the site of the previously seen left sacral metastasis.  This most likely represents persistent viable malignancy, especially given completion of radiation therapy at least 7 months prior.  Otherwise, there is no suspicious evidence to suggest other sites of active or treated metastatic disease.\par 2.  Interval resolution of uptake and interval healing of the left lateral third and fourth ribs, most likely posttraumatic.\par 3.  No evidence to suggest marginal or regional jose cruz recurrence of previously treated right breast or tongue malignancies.\par 4.  No suspicious nodularity at the margins of right upper lobectomy.\par 5.  Otherwise stable PET/CT evaluation of the rest of the soft tissue structures, including a 6 mm groundglass nodule in the anterior right lower lobe lung.\par \par Today, she said she is doing 'ok' except for unrelenting sacral pain which varies between a 9-10. She is taking gabapentin as ordered and gets minimal relief from the pain. Ambulating with cane - gait steady with use of assistive device. Patient states she has significantly reduced her alcohol intake and is in a treatment program through [facility]. \par \par \par 5/23/18- Ms. Omalley presents today for routine follow up. She underwent a PET scan on 3/28/18. THis showed focal hypermetabolic activity in the sacrum, which did not correspond with any definite abnormality on CT images. Hypermetabolic activity in fractures of the left third-5th rib, nonspecific but favoring a posttraumatic injury. Probable postradiation change in right lung. Otherwise no abnormal activity to suggest malignancy.\par \par Today Ms. Omalley feels overall well. She is accompanied by her new home assistant, hired by her son. She still has pain to the bilateral back of her legs. She had increased LFTs recently, and had a liver ultrasound. She will have a colonoscopy next week, and  will see Dr. Levi and her primary care physician both in june. \par She is still with impaired gait, and is bothered by neuropathy to bilateral feet. \par Denies pain to back.\par \par Oncologic history:\par In approximately 2014 she was diagnosed with a locally advanced breast cancer. She initially saw Dr. Dobson in consult on 10/26/17. I do not have her staging details, but am told she was treated with breast conservation therapy followed by chemotherapy and adjuvant radiation therapy.  She was thereafter maintained on an AI.  Follow up imaging has all been negative.  Her oncology team is at [facility]. \par \par In September 2016, she presented to Dr. Luna after having identified a right lateral tongue lesion noted by a dentist. She underwent partial resection of lateral tongue lesion on 9/14/16.  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  It was predominantly non-keratizing with maximal thickness of 1 mm.   She was suggested further surgical resection but when extent of disease imaging identified a lung mass, further surgery was deferred.  PET scan 10/3/16 which revealed increased activity in  the right aspect of the tongue compatible with post surgical changes. There was additionally a suspicious right apical lung nodule of 2.4 cm with concerns for a second primary. She underwent a  right upper lobe wedge resection with Dr. Foreman on 11/2/16 which revealed squamous cell carcinoma, moderately differentiated with keratinization. 3 lymph nodes negative. [place] [facility] [place] felt that it was unlikely the lung cancer and the oral tongue cancer were related due to difference in keratinization, which would be consistent with the clinical presentation.  She then underwent right upper lobe completion lobectomy on 12/14/16, 9 lymph nodes negative. No residual carcinoma. Margins negative. Stage pT1a, N0. She subsequently underwent further tongue resection surgery on 2/2/17. Right lateral oral tongue was benign, margins negative. \par \par More recently, she had  complains of bilateral  posterior thigh and leg pain as well upper extremity joint pain x 6 months, mostly in the bilateral hands, worst in the morning, improved with motion.  Of note she has had difficulty with neuropathy since completion of her chemotherapy.  She was seen by Dr. Vieira. An MRI [facility]  in April 2017 showed no evidence of tumoral disease. There were postsurgical changes present. A PET scan on 6/14/17 showed no evidence of residual, recurrent, or metastatic disease. MRI cervical spine on 10/18/17 showed suspicion for bony mets involving C4-C6 as well as cervical spondylosis with spinal stenosis. \par Specifically, there was T1 hypointense signal involving the left side of C6 vertebral body extending into the left pedicle concerning for marrow infiltration as well as smaller focus seen within the left C4 vertebral body.  She denies any neck pain or acute changes, states symptoms have been constant over the past 6 months.  She denies any new motor or sensory changes. No change in bowel or bladder or perianal numbness. Recent labs show an elevated ESR. EMG studies 10/3/17 show an abnormal study with electrophysiologic evidence of a sensorimotor axonal large-fiber polyneuropathy, unchanged. No evidence of radiculopathy, plexopathy, myopathy, or other focal neuropathy.  She  takes advil daily which helps with joint pain in her hands. Denies headaches, neck pain, incontinence. Reports  several falls related to her neuropathy where  she loses her balance. Her last fall was in May 2017 where she broke some ribs and was hospitalized x 4 days.   Her case was reviewed at the spine tumor board and with radiology and it was determined the  findings were likely degenerative. A plan was made to  f/u and PET scan in 1 month. \par \par 12/18/17- Follow up\par Ms. Omalley is a 62 year old female who was seen in consultation on 10/26/17 for questionable bone metastasis. \par MRI  cervical spine 10/18/17 was  suspicious for bony bone metastasis involving C4 and C6 vertebral bodies.\par Today she c/o of bilateral posterior thigh pain 7/10. Partially relieved with advil 2 tabs to 4/10. This pain has been ongoing for at least 6 months.  She has discontinued her statin 1 week prior per recommendations of med onc to see if this may be associated with her symptoms.  \par \par PET/CT [facility] 11/29/17 \par 1-area of increased FDG activity in the left side of the sacrum, with a maximum SUV of 11.6 and subtle underlying sclerosis, suspicious for metastatic lesion given he history of cancer. Additionally, there was increased FDG activtiy in the left thyroid lobe, largely replaced by a nodule with mild FDG activity.There was a small area of increased activity in the C6 vertebral body, felt to be degenerative in nature. 1cm partly solid partly groundglass density in the anterior basal segment of the right lower lobe extending into the major fissure without FDG activity.\par \par MRI cervical spine w/wo contrast 12/14/17\par 1-extensive bone marrow edema signal within the C6 and to a lesser degree C5 and C& vertebral bodies is likely degenerative\par 2-disc bulges and left paracentral disc protrusions at C4/C5 and C5/C6 exert mass effect on the left hemicord without abnormal cord signal, contribute to moderate central canal stenosis, and severe bilateral foraminal narrowing\par 3-advanced left facet arthrosi at C5/C6 with moderate reactive bone marrow edema signal on both sides of the facet joint\par 4-mild degenerative anterolisthesis at C4/C5\par 5-enlarged and heterogeneous left thyroid lobe\par \par Ms. Omalley was recommended for sacral biopsy based on these results, and results were consistent with breast primary on 12/20/17. She was advised follow up with her medical oncologist, and was referred back for follow up with me today to discuss SBRT to the sacral lesion.\par \par 1/10/18- Ms. Omalley presents today for follow up. \par She underwent biopsy of a sacral lesion on 12/20/18. Pathology was consistent with metastatic breast carcinoma. \par Today she notes the pain in the bilateral posterior thighs is much improved. It was previously a 6-7/10 at worst, and now is a 2-3/10 at worst. She was recommended to stop exemestane by her oncologist to see if this helped the pain, however she had no improvement and re-started the exemestane. She was recommended to stop lipitor recently, and has had significant improvement in her pain since then. \par Pain is better toward the end of the day when she moves more. \par She denies numbness and tingling in upper or lower extremities. Denies weakness/tingling in upper extremities. No bowel/bladder changes.  Notes she sometimes has difficulty walking due to pain in bilateral thighs and mid sacrum, left greater than right. \par SHe will see her Medical oncologist today, Dr. Levi at [facility]\par \par 2/28/18-Ms. Omalley presents today for post treatment evaluation. She completed radiation therapy to the left sacrum for a dose of 2400 cGy over 3 treatments from 1/29/18-2/2/18. She tolerated treatment well. She presents today for post treatment evaluation. \par She had frequent falls during and after her treatment, ans has been following with her PCP Dr. Landry. \par Ms. Omalley notes today that she still has pain in her bilateral posterior thighs. This has returned over the last few weeks after initially improving when her statin was discontinued. She denies back pain. She has no darkening of the skin in her lower back. She denies muscle weakness. \par She has not had any falls since he last one at the end of January. She has cut down on her drinking since then. \par She was not approved for home care due tot he fact that she is not homebound\par She has an appointment with Dr. Levi next week.

## 2019-07-31 ENCOUNTER — APPOINTMENT (OUTPATIENT)
Dept: PHYSICAL MEDICINE AND REHAB | Facility: CLINIC | Age: 64
End: 2019-07-31
Payer: MEDICARE

## 2019-07-31 VITALS — OXYGEN SATURATION: 95 % | HEIGHT: 69 IN | HEART RATE: 105 BPM | BODY MASS INDEX: 25.33 KG/M2 | WEIGHT: 171 LBS

## 2019-07-31 PROCEDURE — 99214 OFFICE O/P EST MOD 30 MIN: CPT

## 2019-07-31 NOTE — ASSESSMENT
[FreeTextEntry1] : Impression:\par 1. Lumbar Spinal Stenosis\par \par Plan: Review of the history, physical examination, and imaging, the patient's symptoms remain consistent with Lumbar Spinal Stenosis with claudication. The imaging results and diagnosis were reviewed with the patient. We reviewed potential treatment options including physical therapy, oral medication, interventional spine procedures, and surgery; as well as alternative therapeutics such as acupuncture and massage. We also reviewed the importance of biomechanics, ergonomics, and posture in the long term management of the condition. At this time the patient is interested in interventional options for symptom reduction. I have offered the patient the option of a repeat epidural steroid injection. We discussed all the risks, benefits, alternative treatments, and prognosis. The patient expressed understanding and would like to move forward. We will schedule for the injection as well as follow up post-procedure. We will plan for Caudal GEO. The patient expressed verbal understanding and is in agreement with the plan of care. All of the patient's questions and concerns were addressed during today's visit.

## 2019-07-31 NOTE — PHYSICAL EXAM
[FreeTextEntry1] : GEN: AAOx3, NAD.\par PSYCH: Normal mood and affect. Responds appropriately to commands.\par EYES: Sclerae Anicteric. No discharge. EOMI.\par RESP: Breathing unlabored.\par CV: DP pulses 2+ and equal. No varicosities noted.\par EXT: No C/C/E.\par SKIN: No lesions noted.\par STRENGTH: 5/5 bilateral hip flexors, knee extensors, knee flexors, ankle dorsiflexors, long toe extensors, ankle plantar flexors, hip extensors, hip abductors.\par TONE: Normal, No clonus.\par REFLEXES: 1+ symmetric patella, absent bilateral achilles. Plantars downgoing bilaterally.\par SENS: Grossly intact to light touch bilateral lower extremities.\par INSP: Spine alignment is midline, with no evidence of scoliosis.\par STANCE: No Trendelenburg with single leg stance.\par GAIT: Non antalgic, normal reciprocating heel to toe\par LUMBAR ROM: Flexion full/pain free. Extension and oblique extension limited with axial/radicular Sx.\par PALP: There is no tenderness over the midline spinous processes, paravertebral muscles, SIJ, or greater trochanters bilaterally.\par SPECIAL: SLR and Slump test negative bilaterally. FADIR, MATTHEW negative bilaterally.

## 2019-07-31 NOTE — HISTORY OF PRESENT ILLNESS
[FreeTextEntry1] : Ms. YAMILA WILLAMS is a very pleasant 63 year female who comes in for follow up evaluation of the lower back and bilateral buttocks after having successful relief with GEO in Jan 2019. She has recently noticed symptoms beginning to return, which remains located in the low back radiating to the legs intermittent in nature and described as dull ache. The pain is rated as 6/10 during today's visit, and ranges from 4-8/10. The patient's symptoms are aggravated mostly upon waking in the morning with stiffness/aching and alleviated by sitting/bending Advil and Tylenol. The patient denies any night pain, numbness/tingling, weakness, or bowel/bladder dysfunction. The patient has no other complaints at this time.

## 2019-08-13 ENCOUNTER — APPOINTMENT (OUTPATIENT)
Dept: PHYSICAL MEDICINE AND REHAB | Facility: CLINIC | Age: 64
End: 2019-08-13
Payer: MEDICARE

## 2019-08-13 PROCEDURE — 62323 NJX INTERLAMINAR LMBR/SAC: CPT

## 2019-08-19 ENCOUNTER — MEDICATION RENEWAL (OUTPATIENT)
Age: 64
End: 2019-08-19

## 2019-08-28 ENCOUNTER — APPOINTMENT (OUTPATIENT)
Dept: PHYSICAL MEDICINE AND REHAB | Facility: CLINIC | Age: 64
End: 2019-08-28
Payer: MEDICARE

## 2019-08-28 VITALS — BODY MASS INDEX: 25.33 KG/M2 | HEIGHT: 69 IN | OXYGEN SATURATION: 98 % | HEART RATE: 103 BPM | WEIGHT: 171 LBS

## 2019-08-28 PROCEDURE — 99214 OFFICE O/P EST MOD 30 MIN: CPT

## 2019-08-28 NOTE — HISTORY OF PRESENT ILLNESS
[FreeTextEntry1] : Ms. YAMILA WILLAMS is a very pleasant 63 year female who comes in for follow up evaluation of the lower back and bilateral buttocks and thighs after undergoing an GEO on 08/13/19. The patient reports approximately 80% improvement following the procedure at this time. There is minimal residual discomfort, which remains located in the low back no longer radiating to the legs intermittent in nature and described as dull ache. The pain is rated as 3/10 during today's visit, and ranges from 2-4/10. The patient's symptoms are aggravated mostly upon waking in the morning with stiffness/aching and alleviated by sitting/bending Advil and Tylenol. The patient also complains of right knee pain which has been ongoing for many years. She recently underwent CSI in May and June 2019 with another physician which only provided temporary relief. The patient denies any night pain, numbness/tingling, weakness, or bowel/bladder dysfunction. The patient has no other complaints at this time.

## 2019-08-28 NOTE — ASSESSMENT
[FreeTextEntry1] : Impression:\par 1. Lumbar Spinal Stenosis\par 2. Right Knee OA\par \par Plan: The patient has responded very well to the epidural steroid injection, reporting approximately 80% improvement in symptoms. Review of the history, physical examination, and imaging, the patient's symptoms remain consistent with Lumbar Spinal Stenosis with claudication. The imaging results and diagnosis were reviewed with the patient. The patient is interested in further treatment for her knee. I offered the option of viscosupplementation since she had two CSI recently with minimal and unsustained relief. She would like to proceed and will schedule her series based on her travel calendar. The patient expressed verbal understanding and is in agreement with the plan of care. All of the patient's questions and concerns were addressed during today's visit.

## 2019-08-28 NOTE — DATA REVIEWED
[FreeTextEntry1] : MR LS 12/2017: L4/L5 disc height loss with grade 1 anterolisthesis of L4 on L5 and bilateral facet arthrosis. There is moderate central canal stenosis, and severe bilateral foraminal narrowing. L5/S1 disc height loss with small disc bulge and bilateral facet arthrosis. \par \par MRI LS 1/2019: L4-5 Grade 1 anterolisthesis, severe bilateral facet hypertrophy, and ligamentum flavum hypertrophy, resultant severe canal stenosis, and bilateral foraminal stenoses with impingement of the exiting L4 nerve roots. L5-S1 moderate bilateral facet hypertrophy with ligamentum flavum hypertrophy. Interval development of a new well-defined round T2/STIR signal hyperintensities within the T12 pedicles bilaterally. \par \par XR R Knee 6/2018: Tricompartmental degnerative changes.

## 2019-09-18 ENCOUNTER — APPOINTMENT (OUTPATIENT)
Dept: PHYSICAL MEDICINE AND REHAB | Facility: CLINIC | Age: 64
End: 2019-09-18
Payer: MEDICARE

## 2019-09-18 VITALS
WEIGHT: 170 LBS | BODY MASS INDEX: 25.18 KG/M2 | DIASTOLIC BLOOD PRESSURE: 80 MMHG | HEIGHT: 69 IN | SYSTOLIC BLOOD PRESSURE: 140 MMHG

## 2019-09-18 PROCEDURE — 20610 DRAIN/INJ JOINT/BURSA W/O US: CPT | Mod: RT

## 2019-09-23 ENCOUNTER — APPOINTMENT (OUTPATIENT)
Dept: INTERNAL MEDICINE | Facility: CLINIC | Age: 64
End: 2019-09-23
Payer: MEDICARE

## 2019-09-23 VITALS
TEMPERATURE: 98.6 F | BODY MASS INDEX: 24.59 KG/M2 | OXYGEN SATURATION: 96 % | HEART RATE: 108 BPM | HEIGHT: 69 IN | WEIGHT: 166 LBS | DIASTOLIC BLOOD PRESSURE: 94 MMHG | SYSTOLIC BLOOD PRESSURE: 144 MMHG

## 2019-09-23 PROCEDURE — 99215 OFFICE O/P EST HI 40 MIN: CPT

## 2019-09-23 RX ORDER — ENOXAPARIN SODIUM 100 MG/ML
80 INJECTION SUBCUTANEOUS
Qty: 60 | Refills: 0 | Status: DISCONTINUED | COMMUNITY
Start: 2019-06-17 | End: 2019-09-23

## 2019-09-25 ENCOUNTER — APPOINTMENT (OUTPATIENT)
Dept: PHYSICAL MEDICINE AND REHAB | Facility: CLINIC | Age: 64
End: 2019-09-25
Payer: MEDICARE

## 2019-09-25 PROCEDURE — 20610 DRAIN/INJ JOINT/BURSA W/O US: CPT | Mod: RT

## 2019-10-02 ENCOUNTER — APPOINTMENT (OUTPATIENT)
Dept: PHYSICAL MEDICINE AND REHAB | Facility: CLINIC | Age: 64
End: 2019-10-02
Payer: MEDICARE

## 2019-10-02 VITALS
BODY MASS INDEX: 24.44 KG/M2 | HEART RATE: 102 BPM | OXYGEN SATURATION: 97 % | SYSTOLIC BLOOD PRESSURE: 140 MMHG | HEIGHT: 69 IN | WEIGHT: 165 LBS | DIASTOLIC BLOOD PRESSURE: 70 MMHG

## 2019-10-02 PROCEDURE — 20610 DRAIN/INJ JOINT/BURSA W/O US: CPT | Mod: RT

## 2019-10-02 RX ORDER — HYALURONATE SODIUM 20 MG/2 ML
20 SYRINGE (ML) INTRAARTICULAR
Qty: 1 | Refills: 0 | Status: COMPLETED | COMMUNITY
Start: 2019-08-28 | End: 2019-10-02

## 2019-10-17 NOTE — ASU PATIENT PROFILE, ADULT - PMH
Asthma    Breast cancer  right  COPD (chronic obstructive pulmonary disease)    High cholesterol    Hypertension    Lung nodule  right  Tongue lesion [Nl] : Integumentary

## 2019-10-30 ENCOUNTER — EMERGENCY (EMERGENCY)
Facility: HOSPITAL | Age: 64
LOS: 1 days | Discharge: HOME | End: 2019-10-30
Attending: EMERGENCY MEDICINE | Admitting: EMERGENCY MEDICINE
Payer: MEDICARE

## 2019-10-30 VITALS
HEART RATE: 90 BPM | HEIGHT: 68 IN | SYSTOLIC BLOOD PRESSURE: 129 MMHG | DIASTOLIC BLOOD PRESSURE: 74 MMHG | TEMPERATURE: 99 F | WEIGHT: 159.39 LBS | OXYGEN SATURATION: 98 % | RESPIRATION RATE: 18 BRPM

## 2019-10-30 VITALS
DIASTOLIC BLOOD PRESSURE: 72 MMHG | TEMPERATURE: 98 F | OXYGEN SATURATION: 99 % | RESPIRATION RATE: 16 BRPM | HEART RATE: 81 BPM | SYSTOLIC BLOOD PRESSURE: 123 MMHG

## 2019-10-30 DIAGNOSIS — Z98.89 OTHER SPECIFIED POSTPROCEDURAL STATES: Chronic | ICD-10-CM

## 2019-10-30 DIAGNOSIS — R51 HEADACHE: ICD-10-CM

## 2019-10-30 DIAGNOSIS — Z41.9 ENCOUNTER FOR PROCEDURE FOR PURPOSES OTHER THAN REMEDYING HEALTH STATE, UNSPECIFIED: Chronic | ICD-10-CM

## 2019-10-30 DIAGNOSIS — Z98.890 OTHER SPECIFIED POSTPROCEDURAL STATES: Chronic | ICD-10-CM

## 2019-10-30 DIAGNOSIS — B34.9 VIRAL INFECTION, UNSPECIFIED: ICD-10-CM

## 2019-10-30 LAB
ALBUMIN SERPL ELPH-MCNC: 4 G/DL — SIGNIFICANT CHANGE UP (ref 3.3–5)
ALP SERPL-CCNC: 66 U/L — SIGNIFICANT CHANGE UP (ref 40–120)
ALT FLD-CCNC: 14 U/L — SIGNIFICANT CHANGE UP (ref 10–45)
ANION GAP SERPL CALC-SCNC: 17 MMOL/L — SIGNIFICANT CHANGE UP (ref 5–17)
AST SERPL-CCNC: 20 U/L — SIGNIFICANT CHANGE UP (ref 10–40)
BASOPHILS # BLD AUTO: 0.06 K/UL — SIGNIFICANT CHANGE UP (ref 0–0.2)
BASOPHILS NFR BLD AUTO: 1.7 % — SIGNIFICANT CHANGE UP (ref 0–2)
BILIRUB SERPL-MCNC: 0.6 MG/DL — SIGNIFICANT CHANGE UP (ref 0.2–1.2)
BUN SERPL-MCNC: 13 MG/DL — SIGNIFICANT CHANGE UP (ref 7–23)
CALCIUM SERPL-MCNC: 9.4 MG/DL — SIGNIFICANT CHANGE UP (ref 8.4–10.5)
CHLORIDE SERPL-SCNC: 97 MMOL/L — SIGNIFICANT CHANGE UP (ref 96–108)
CO2 SERPL-SCNC: 23 MMOL/L — SIGNIFICANT CHANGE UP (ref 22–31)
CREAT SERPL-MCNC: 0.67 MG/DL — SIGNIFICANT CHANGE UP (ref 0.5–1.3)
EOSINOPHIL # BLD AUTO: 0 K/UL — SIGNIFICANT CHANGE UP (ref 0–0.5)
EOSINOPHIL NFR BLD AUTO: 0 % — SIGNIFICANT CHANGE UP (ref 0–6)
GLUCOSE SERPL-MCNC: 90 MG/DL — SIGNIFICANT CHANGE UP (ref 70–99)
HCT VFR BLD CALC: 38.5 % — SIGNIFICANT CHANGE UP (ref 34.5–45)
HGB BLD-MCNC: 13.3 G/DL — SIGNIFICANT CHANGE UP (ref 11.5–15.5)
LIDOCAIN IGE QN: 14 U/L — SIGNIFICANT CHANGE UP (ref 7–60)
LYMPHOCYTES # BLD AUTO: 0.94 K/UL — LOW (ref 1–3.3)
LYMPHOCYTES # BLD AUTO: 25 % — SIGNIFICANT CHANGE UP (ref 13–44)
MCHC RBC-ENTMCNC: 34.5 GM/DL — SIGNIFICANT CHANGE UP (ref 32–36)
MCHC RBC-ENTMCNC: 43 PG — HIGH (ref 27–34)
MCV RBC AUTO: 124.6 FL — HIGH (ref 80–100)
MONOCYTES # BLD AUTO: 0.32 K/UL — SIGNIFICANT CHANGE UP (ref 0–0.9)
MONOCYTES NFR BLD AUTO: 8.6 % — SIGNIFICANT CHANGE UP (ref 2–14)
NEUTROPHILS # BLD AUTO: 2.44 K/UL — SIGNIFICANT CHANGE UP (ref 1.8–7.4)
NEUTROPHILS NFR BLD AUTO: 64.7 % — SIGNIFICANT CHANGE UP (ref 43–77)
PLATELET # BLD AUTO: 315 K/UL — SIGNIFICANT CHANGE UP (ref 150–400)
POTASSIUM SERPL-MCNC: 4.1 MMOL/L — SIGNIFICANT CHANGE UP (ref 3.5–5.3)
POTASSIUM SERPL-SCNC: 4.1 MMOL/L — SIGNIFICANT CHANGE UP (ref 3.5–5.3)
PROT SERPL-MCNC: 8.2 G/DL — SIGNIFICANT CHANGE UP (ref 6–8.3)
RBC # BLD: 3.09 M/UL — LOW (ref 3.8–5.2)
RBC # FLD: 12.7 % — SIGNIFICANT CHANGE UP (ref 10.3–14.5)
SODIUM SERPL-SCNC: 137 MMOL/L — SIGNIFICANT CHANGE UP (ref 135–145)
WBC # BLD: 3.77 K/UL — LOW (ref 3.8–10.5)
WBC # FLD AUTO: 3.77 K/UL — LOW (ref 3.8–10.5)

## 2019-10-30 PROCEDURE — 85025 COMPLETE CBC W/AUTO DIFF WBC: CPT

## 2019-10-30 PROCEDURE — 99284 EMERGENCY DEPT VISIT MOD MDM: CPT

## 2019-10-30 PROCEDURE — 80053 COMPREHEN METABOLIC PANEL: CPT

## 2019-10-30 PROCEDURE — 99283 EMERGENCY DEPT VISIT LOW MDM: CPT

## 2019-10-30 PROCEDURE — 36415 COLL VENOUS BLD VENIPUNCTURE: CPT

## 2019-10-30 PROCEDURE — 83690 ASSAY OF LIPASE: CPT

## 2019-10-30 RX ORDER — METOCLOPRAMIDE HCL 10 MG
10 TABLET ORAL ONCE
Refills: 0 | Status: COMPLETED | OUTPATIENT
Start: 2019-10-30 | End: 2019-10-30

## 2019-10-30 RX ORDER — ACETAMINOPHEN 500 MG
650 TABLET ORAL ONCE
Refills: 0 | Status: COMPLETED | OUTPATIENT
Start: 2019-10-30 | End: 2019-10-30

## 2019-10-30 RX ADMIN — Medication 650 MILLIGRAM(S): at 12:26

## 2019-10-30 NOTE — ED PROVIDER NOTE - OBJECTIVE STATEMENT
63 y/o F smoker with PSHx tongue & lung surgeries and PMHx of HTN, neuropathy, DVT, and metastatic breast cancer s/p lumpectomy and lymphadenectomy presents to the ED with complaints of chills, nausea, vomiting, diarrhea, and headaches x 5 days. Pt reports symptoms began as vomiting, diarrhea and headaches with some relief with extra-strength Tylenol. Symptoms have since been improving, but is reports that the headache returned yesterday. Denies any cough, recent sick contact, abdominal pain, or bloody stool. Of note, pt also has complaints of atraumatic b/l thigh pain which is aggravated with movement. Pt has had prior pelvic injections for this pain with relief. 63 y/o F smoker with HTN, neuropathy, DVT, and metastatic breast cancer s/p lumpectomy , lung resection in the past, presents to the ED with complaints of chills, nausea, vomiting, diarrhea, and headaches x 5 days. Pt reports symptoms began as vomiting, diarrhea and headaches with some relief with extra-strength Tylenol. Symptoms have since been improving, but  reports that the headache returned yesterday. Denies any cough, congestion, sore throat, recent sick contact, abdominal pain, or bloody stool. Of note, pt also has complaints of atraumatic b/l thigh pain which is aggravated with movement. Pt has had prior  pain management injections done  for this pain with relief.

## 2019-10-30 NOTE — ED PROVIDER NOTE - CLINICAL SUMMARY MEDICAL DECISION MAKING FREE TEXT BOX
65 yo female with h/o HTN, metastatic breast CA, neuropathy, DVT, in the ER c/o non-bloody diarrhea, nausea and vomiting, chills, HA that started 5 days ago. Pt reports that HA  subsided after she took extra-strength Tylenol. Diarrhea and vomiting subsided 2 days ago , but pt still had decreased appetite and some HA. present to ER requesting a flu-vaccination. Pt appears well, non-toxic, afebrile, VSS, lungs- CTA b/l, abd-soft, NTND. no focal neuro deficits. labs done- no acute findings.   Pt reports complete resolution of HA after tylenol today , tolerate PO well and seeking discharge. most likely viral etiology.   will d/c home for out pt f/u with PMD/flu vaccination. returned precautions discussed. pt agrees with a treatment plan.

## 2019-10-30 NOTE — ED ADULT NURSE NOTE - OBJECTIVE STATEMENT
Pt presents to ED complaining of headache and bilateral posterior leg pain x 4 days. Pt states "I was having a normal day on Friday, when I went to [employer] I suddenly got a really bad headache, and then I started having nausea, vomiting and diarrhea. The headache started to go away yesterday but now it is back".  Pt reports fevers and chills at home. Denies any recent trauma. A&Ox4. Denies SOB, chest pain, dysuria.

## 2019-10-30 NOTE — ED PROVIDER NOTE - PATIENT PORTAL LINK FT
You can access the FollowMyHealth Patient Portal offered by [facility] by registering at the following website: [email or web address]. By joining [email or web address]’s FollowMyHealth portal, you will also be able to view your health information using other applications (apps) compatible with our system.

## 2019-10-30 NOTE — ED ADULT NURSE NOTE - NSIMPLEMENTINTERV_GEN_ALL_ED
Implemented All Universal Safety Interventions:  [place] to call system. Call bell, personal items and telephone within reach. Instruct patient to call for assistance. Room bathroom lighting operational. Non-slip footwear when patient is off stretcher. Physically safe environment: no spills, clutter or unnecessary equipment. Stretcher in lowest position, wheels locked, appropriate side rails in place.

## 2019-10-30 NOTE — ED PROVIDER NOTE - NSFOLLOWUPINSTRUCTIONS_ED_ALL_ED_FT
I have discussed the discharge plan with the patient. The patient agrees with the plan, as discussed.  The patient understands Emergency Department diagnosis is a preliminary diagnosis often based on limited information and that the patient must adhere to the follow-up plan as discussed.  The patient understands that if the symptoms worsen or if prescribed medications do not have the desired/planned effect that the patient may return to the Emergency Department at any time for further evaluation and treatment.    Nausea and Vomiting, Adult  Nausea is feeling sick to your stomach or feeling that you are about to throw up (vomit). Vomiting is when food in your stomach is thrown up and out of the mouth. Throwing up can make you feel weak. It can also make you lose too much water in your body (get dehydrated). If you lose too much water in your body, you may:  Feel tired. Feel thirsty. Have a dry mouth.Have cracked lips.Go pee (urinate) less often.Older adults and people with other diseases or a weak body defense system (immune system) are at higher risk for losing too much water in the body. If you feel sick to your stomach and you throw up, it is important to follow instructions from your doctor about how to take care of yourself.  Follow these instructions at home:  Watch your symptoms for any changes. Tell your doctor about them. Follow these instructions to care for yourself at home.  Eating and drinking             Take an ORS (oral rehydration solution). This is a drink that is sold at pharmacies and stores.Drink clear fluids in small amounts as you are able, such as:  Water.Ice chips.Fruit juice that has water added (diluted fruit juice).Low-calorie sports drinks.Eat bland, easy-to-digest foods in small amounts as you are able, such as:  Bananas.Applesauce.Rice.Low-fat (lean) meats.Toast.Crackers.Avoid drinking fluids that have a lot of sugar or caffeine in them. This includes energy drinks, sports drinks, and soda.Avoid alcohol.Avoid spicy or fatty foods.General instructions     Take over-the-counter and prescription medicines only as told by your doctor.Drink enough fluid to keep your pee (urine) pale yellow.Wash your hands often with soap and water. If you cannot use soap and water, use hand .Make sure that all people in your home wash their hands well and often.Rest at home while you get better.Watch your condition for any changes.Take slow and deep breaths when you feel sick to your stomach.Keep all follow-up visits as told by your doctor. This is important.Contact a doctor if:  Your symptoms get worse.You have new symptoms.You have a fever.You cannot drink fluids without throwing up.You feel sick to your stomach for more than 2 days.You feel light-headed or dizzy.You have a headache.You have muscle cramps.You have a rash.You have pain while peeing.Get help right away if:  You have pain in your chest, neck, arm, or jaw.You feel very weak or you pass out (faint).You throw up again and again.You have throw up that is bright red or looks like black coffee grounds.You have bloody or black poop (stools) or poop that looks like tar.You have a very bad headache, a stiff neck, or both.You have very bad pain, cramping, or bloating in your belly (abdomen).You have trouble breathing.You are breathing very quickly.Your heart is beating very quickly.Your skin feels cold and clammy.You feel confused.You have signs of losing too much water in your body, such as:  Dark pee, very little pee, or no pee.Cracked lips.Dry mouth.Sunken eyes.Sleepiness.Weakness.These symptoms may be an emergency. Do not wait to see if the symptoms will go away. Get medical help right away. Call your local emergency services (911 in the [place] Do not drive yourself to the hospital.   Summary  Nausea is feeling sick to your stomach or feeling that you are about to throw up (vomit). Vomiting is when food in your stomach is thrown up and out of the mouth.Follow instructions from your doctor about eating and drinking to keep from losing too much water in your body.Take over-the-counter and prescription medicines only as told by your doctor.Contact your doctor if your symptoms get worse or you have new symptoms.Keep all follow-up visits as told by your doctor. This is important.This information is not intended to replace advice given to you by your health care provider. Make sure you discuss any questions you have with your health care provider.        Viral Illness, Adult  Viruses are tiny germs that can get into a person's body and cause illness. There are many different types of viruses, and they cause many types of illness. Viral illnesses can range from mild to severe. They can affect various parts of the body.  Common illnesses that are caused by a virus include colds and the flu. Viral illnesses also include serious conditions such as HIV/AIDS (human immunodeficiency virus/acquired immunodeficiency syndrome). A few viruses have been linked to certain cancers.  What are the causes?  Many types of viruses can cause illness. Viruses invade cells in your body, multiply, and cause the infected cells to malfunction or die. When the cell dies, it releases more of the virus. When this happens, you develop symptoms of the illness, and the virus continues to spread to other cells. If the virus takes over the function of the cell, it can cause the cell to divide and grow out of control, as is the case when a virus causes cancer.  Different viruses get into the body in different ways. You can get a virus by:  Swallowing food or water that is contaminated with the virus.Breathing in droplets that have been coughed or sneezed into the air by an infected person.Touching a surface that has been contaminated with the virus and then touching your eyes, nose, or mouth.Being bitten by an insect or animal that carries the virus.Having sexual contact with a person who is infected with the virus.Being exposed to blood or fluids that contain the virus, either through an open cut or during a transfusion.If a virus enters your body, your body's defense system (immune system) will try to fight the virus. You may be at higher risk for a viral illness if your immune system is weak.  What are the signs or symptoms?  Symptoms vary depending on the type of virus and the location of the cells that it invades. Common symptoms of the main types of viral illnesses include:  Cold and flu viruses     Fever.Headache.Sore throat.Muscle aches.Nasal congestion.Cough.Digestive system (gastrointestinal) viruses     Fever.Abdominal pain.Nausea.Diarrhea.Liver viruses (hepatitis)     Loss of appetite.Tiredness.Yellowing of the skin (jaundice).Brain and spinal cord viruses     Fever.Headache.Stiff neck.Nausea and vomiting.Confusion or sleepiness.Skin viruses     Warts.Itching.Rash.Sexually transmitted viruses     Discharge.Swelling.Redness.Rash.How is this treated?  Viruses can be difficult to treat because they live within cells. Antibiotic medicines do not treat viruses because these drugs do not get inside cells. Treatment for a viral illness may include:  Resting and drinking plenty of fluids.Medicines to relieve symptoms. These can include over-the-counter medicine for pain and fever, medicines for cough or congestion, and medicines to relieve diarrhea.Antiviral medicines. These drugs are available only for certain types of viruses. They may help reduce flu symptoms if taken early. There are also many antiviral medicines for hepatitis and HIV/AIDS.Some viral illnesses can be prevented with vaccinations. A common example is the flu shot.  Follow these instructions at home:  Medicines       Take over-the-counter and prescription medicines only as told by your health care provider.If you were prescribed an antiviral medicine, take it as told by your health care provider. Do not stop taking the medicine even if you start to feel better.Be aware of when antibiotics are needed and when they are not needed. Antibiotics do not treat viruses. If your health care provider thinks that you may have a bacterial infection as well as a viral infection, you may get an antibiotic.  Do not ask for an antibiotic prescription if you have been diagnosed with a viral illness. That will not make your illness go away faster.Frequently taking antibiotics when they are not needed can lead to antibiotic resistance. When this develops, the medicine no longer works against the bacteria that it normally fights.General instructions     Drink enough fluids to keep your urine clear or pale yellow.Rest as much as possible.Return to your normal activities as told by your health care provider. Ask your health care provider what activities are safe for you.Keep all follow-up visits as told by your health care provider. This is important.How is this prevented?  Take these actions to reduce your risk of viral infection:Image  Eat a healthy diet and get enough rest.Wash your hands often with soap and water. This is especially important when you are in public places. If soap and water are not available, use hand .Avoid close contact with friends and family who have a viral illness.If you travel to areas where viral gastrointestinal infection is common, avoid drinking water or eating raw food.Keep your immunizations up to date. Get a flu shot every year as told by your health care provider.Do not share toothbrushes, nail clippers, razors, or needles with other people.Always practice safe sex.Contact a health care provider if:  You have symptoms of a viral illness that do not go away.Your symptoms come back after going away.Your symptoms get worse.Get help right away if:  You have trouble breathing.You have a severe headache or a stiff neck.You have severe vomiting or abdominal pain.This information is not intended to replace advice given to you by your health care provider. Make sure you discuss any questions you have with your health care provider.        General Headache Without Cause  A headache is pain or discomfort felt around the head or neck area. The specific cause of a headache may not be found. There are many causes and types of headaches. A few common ones are:  Tension headaches.Migraine headaches.Cluster headaches.Chronic daily headaches.Follow these instructions at home:  Watch your condition for any changes. Let your health care provider know about them. Take these steps to help with your condition:  Managing pain             Take over-the-counter and prescription medicines only as told by your health care provider.Lie down in a dark, quiet room when you have a headache.If directed, put ice on your head and neck area:  Put ice in a plastic bag.Place a towel between your skin and the bag.Leave the ice on for 20 minutes, 2–3 times per day.If directed, apply heat to the affected area. Use the heat source that your health care provider recommends, such as a moist heat pack or a heating pad.  Place a towel between your skin and the heat source.Leave the heat on for 20–30 minutes.Remove the heat if your skin turns bright red. This is especially important if you are unable to feel pain, heat, or cold. You may have a greater risk of getting burned.Keep lights dim if bright lights bother you or make your headaches worse.Eating and drinking     Eat meals on a regular schedule.If you drink alcohol:  Limit how much you use to:   0–1 drink a day for women. 0–2 drinks a day for men. Be aware of how much alcohol is in your drink. In the [place] one drink equals one 12 oz bottle of beer (355 mL), one 5 oz glass of wine (148 mL), or one 1½ oz glass of hard liquor (44 mL).Stop drinking caffeine, or decrease the amount of caffeine you drink.General instructions       Keep a headache journal to help find out what may trigger your headaches. For example, write down:  What you eat and drink.How much sleep you get.Any change to your diet or medicines.Try massage or other relaxation techniques.Limit stress.Sit up straight, and do not tense your muscles.Do not use any products that contain nicotine or tobacco, such as cigarettes, e-cigarettes, and chewing tobacco. If you need help quitting, ask your health care provider.Exercise regularly as told by your health care provider.Sleep on a regular schedule. Get 7–9 hours of sleep each night, or the amount recommended by your health care provider.Keep all follow-up visits as told by your health care provider. This is important.Contact a health care provider if:  Your symptoms are not helped by medicine.You have a headache that is different from the usual headache.You have nausea or you vomit.You have a fever.Get help right away if:  Your headache becomes severe quickly.Your headache gets worse after moderate to intense physical activity.You have repeated vomiting.You have a stiff neck.You have a loss of vision.You have problems with speech.You have pain in the eye or ear.You have muscular weakness or loss of muscle control.You lose your balance or have trouble walking.You feel faint or pass out.You have confusion.You have a seizure.Summary  A headache is pain or discomfort felt around the head or neck area.There are many causes and types of headaches. In some cases, the cause may not be found.Keep a headache journal to help find out what may trigger your headaches. Watch your condition for any changes. Let your health care provider know about them.Contact a health care provider if you have a headache that is different from the usual headache, or if your symptoms are not helped by medicine.Get help right away if your headache becomes severe, you vomit, you have a loss of vision, you lose your balance, or you have a seizure.This information is not intended to replace advice given to you by your health care provider. Make sure you discuss any questions you have with your health care provider.

## 2019-10-30 NOTE — ED PROVIDER NOTE - CARE PROVIDER_API CALL
Armando Contreras)  Internal Medicine  [address] [place] [address] [phone]  Phone: [phone]  Fax: [phone]  Follow Up Time:

## 2019-10-30 NOTE — ED ADULT TRIAGE NOTE - OTHER COMPLAINTS
patient ambulatory into ED c/o headache since Friday, bilat pain to posterior legs, and requesting the flu shot---reports subjective fevers, afebrile in triage

## 2019-10-30 NOTE — ED PROVIDER NOTE - ATTENDING CONTRIBUTION TO CARE
65 y/o F smoker with HTN, neuropathy, DVT, and breast cancer s/p lumpectomy, lung resection in the past, presents to the ED with complaints of chills, nausea, vomiting, diarrhea, and headaches x 5 days. Pt appears well, non-toxic, afebrile, VSS, lungs- CTA b/l, abd-soft, NTND. No focal neuro deficits. labs done- no acute findings. Pt reports complete resolution of HA after tylenol today, tolerate PO well and seeking discharge. Most likely viral etiology.   will d/c home for out pt f/u with PMD/flu vaccination. Return precautions given.

## 2019-10-31 ENCOUNTER — INBOUND DOCUMENT (OUTPATIENT)
Age: 64
End: 2019-10-31

## 2019-11-04 ENCOUNTER — APPOINTMENT (OUTPATIENT)
Dept: PHYSICAL MEDICINE AND REHAB | Facility: CLINIC | Age: 64
End: 2019-11-04
Payer: MEDICARE

## 2019-11-04 VITALS
WEIGHT: 165 LBS | HEIGHT: 69 IN | HEART RATE: 80 BPM | RESPIRATION RATE: 16 BRPM | OXYGEN SATURATION: 98 % | BODY MASS INDEX: 24.44 KG/M2

## 2019-11-04 PROCEDURE — 99214 OFFICE O/P EST MOD 30 MIN: CPT

## 2019-11-08 ENCOUNTER — APPOINTMENT (OUTPATIENT)
Dept: PHYSICAL MEDICINE AND REHAB | Facility: CLINIC | Age: 64
End: 2019-11-08
Payer: MEDICARE

## 2019-11-08 PROCEDURE — 62323 NJX INTERLAMINAR LMBR/SAC: CPT

## 2019-11-25 ENCOUNTER — RX RENEWAL (OUTPATIENT)
Age: 64
End: 2019-11-25

## 2019-12-06 ENCOUNTER — APPOINTMENT (OUTPATIENT)
Dept: INTERNAL MEDICINE | Facility: CLINIC | Age: 64
End: 2019-12-06
Payer: MEDICARE

## 2019-12-06 VITALS
HEART RATE: 92 BPM | OXYGEN SATURATION: 96 % | SYSTOLIC BLOOD PRESSURE: 143 MMHG | DIASTOLIC BLOOD PRESSURE: 91 MMHG | TEMPERATURE: 98.5 F

## 2019-12-06 DIAGNOSIS — R04.0 EPISTAXIS: ICD-10-CM

## 2019-12-06 DIAGNOSIS — Z23 ENCOUNTER FOR IMMUNIZATION: ICD-10-CM

## 2019-12-06 DIAGNOSIS — F17.209 NICOTINE DEPENDENCE, UNSPECIFIED, WITH UNSPECIFIED NICOTINE-INDUCED DISORDERS: ICD-10-CM

## 2019-12-06 PROCEDURE — G0009: CPT

## 2019-12-06 PROCEDURE — 99214 OFFICE O/P EST MOD 30 MIN: CPT | Mod: 25

## 2019-12-06 PROCEDURE — 90732 PPSV23 VACC 2 YRS+ SUBQ/IM: CPT

## 2019-12-06 RX ORDER — MAGNESIUM OXIDE 241.3 MG/1000MG
400 TABLET ORAL DAILY
Qty: 90 | Refills: 0 | Status: DISCONTINUED | COMMUNITY
Start: 2019-06-17 | End: 2019-12-06

## 2020-01-09 ENCOUNTER — MEDICATION RENEWAL (OUTPATIENT)
Age: 65
End: 2020-01-09

## 2020-01-10 ENCOUNTER — RX RENEWAL (OUTPATIENT)
Age: 65
End: 2020-01-10

## 2020-01-10 DIAGNOSIS — Z12.0 ENCOUNTER FOR SCREENING FOR MALIGNANT NEOPLASM OF STOMACH: ICD-10-CM

## 2020-01-15 ENCOUNTER — APPOINTMENT (OUTPATIENT)
Dept: PULMONOLOGY | Facility: CLINIC | Age: 65
End: 2020-01-15
Payer: MEDICARE

## 2020-01-15 VITALS
SYSTOLIC BLOOD PRESSURE: 120 MMHG | DIASTOLIC BLOOD PRESSURE: 70 MMHG | TEMPERATURE: 97.6 F | BODY MASS INDEX: 22.22 KG/M2 | HEIGHT: 69 IN | WEIGHT: 150 LBS | HEART RATE: 106 BPM | OXYGEN SATURATION: 98 %

## 2020-01-15 DIAGNOSIS — R09.82 POSTNASAL DRIP: ICD-10-CM

## 2020-01-15 PROCEDURE — 99214 OFFICE O/P EST MOD 30 MIN: CPT

## 2020-01-15 RX ORDER — FLUTICASONE PROPIONATE 50 UG/1
50 SPRAY, METERED NASAL TWICE DAILY
Qty: 3 | Refills: 3 | Status: ACTIVE | COMMUNITY
Start: 2020-01-15 | End: 1900-01-01

## 2020-01-15 NOTE — ASSESSMENT
[FreeTextEntry1] : COPD\par YAMILA WILLAMS is stable CAT score 18. Denies any COPD exacerbation and doing well.  Patient is compliant with her inhalers. Currently using Spiriva and Advair and  required use of the rescue inhaler 5 times a week.  Continue on current inhalers.  Baseline oxygen saturation is within normal limits at rest.  I discussed in detail patient’s condition. Pt states her spiriva is no longer covered by insurance I provided her a sample of trelegy.  She was educated on how to use the inhaler and will follow up in a month with PFT.  Reviewed inhaler use with the patient, as well as, prevention with COPD which including exercise, diet, immunizations and follow up appointments. Discussed symptom flare ups and when to notify us of any changes to her condition. \par \par - Follow with PFT next visit\par - Evaluate change to Trelegy inhaler\par - Follow up in one month\par \par smoking\par \par I discussed in details with the patient the health risk effects of tobacco smoking. The risks include, but not limited to COPD, cancer of head and neck/lung/stomach/ bladder, and coronary artery disease.  I discussed the options of smoke cessation with the patient. The options include nicotine replacement, pharmacological agents, and supportive tools. I recommended to start the nicotine patches and to handle the nicotine craving with the nicotine gum as needed. The initial dose of the nicotine patch depends of the amount and duration of tobacco consumptions.  Pt is unwilling to quit at this time\par \par Metastatic CA\par \par She is followed by oncology at [place] Dr. Levi ([phone]) at [facility] was the PET scan.  She is following her with PET scan and due for one next month.  Per the pt the CA is stable on the last PET. \par \par Last PET scan 10/18/2019 Scarring and 7 mm GGO RLL unchanged.  Impression:  Persistent abnormal hypermetabolic activity involving the left hemisacrum.  Increase uptake involving the left hemimandible.  No evidence to marginal or regional jose cruz recurrence of the right breast malignancy.  Otherwise stable stable  PET/CT.

## 2020-01-15 NOTE — PHYSICAL EXAM
[General Appearance - Well Developed] : well developed [Normal Appearance] : normal appearance [Well Groomed] : well groomed [General Appearance - Well Nourished] : well nourished [No Deformities] : no deformities [General Appearance - In No Acute Distress] : no acute distress [Eyelids - No Xanthelasma] : the eyelids demonstrated no xanthelasmas [Normal Conjunctiva] : the conjunctiva exhibited no abnormalities [Neck Appearance] : the appearance of the neck was normal [Neck Cervical Mass (___cm)] : no neck mass was observed [Normal Oropharynx] : normal oropharynx [Jugular Venous Distention Increased] : there was no jugular-venous distention [Thyroid Diffuse Enlargement] : the thyroid was not enlarged [Heart Sounds] : normal S1 and S2 [Heart Rate And Rhythm] : heart rate and rhythm were normal [Thyroid Nodule] : there were no palpable thyroid nodules [Murmurs] : no murmurs present [Exaggerated Use Of Accessory Muscles For Inspiration] : no accessory muscle use [Respiration, Rhythm And Depth] : normal respiratory rhythm and effort [Auscultation Breath Sounds / Voice Sounds] : lungs were clear to auscultation bilaterally [Nail Clubbing] : no clubbing of the fingernails [Cyanosis, Localized] : no localized cyanosis [Skin Color & Pigmentation] : normal skin color and pigmentation [Petechial Hemorrhages (___cm)] : no petechial hemorrhages [No Venous Stasis] : no venous stasis [] : no rash [Skin Lesions] : no skin lesions [No Skin Ulcers] : no skin ulcer [No Xanthoma] : no  xanthoma was observed [Deep Tendon Reflexes (DTR)] : deep tendon reflexes were 2+ and symmetric [Sensation] : the sensory exam was normal to light touch and pinprick [Oriented To Time, Place, And Person] : oriented to person, place, and time [No Focal Deficits] : no focal deficits [Affect] : the affect was normal [Impaired Insight] : insight and judgment were intact

## 2020-01-15 NOTE — PHYSICAL EXAM
[General Appearance - Well Developed] : well developed [Normal Appearance] : normal appearance [Well Groomed] : well groomed [General Appearance - Well Nourished] : well nourished [No Deformities] : no deformities [General Appearance - In No Acute Distress] : no acute distress [Normal Conjunctiva] : the conjunctiva exhibited no abnormalities [Eyelids - No Xanthelasma] : the eyelids demonstrated no xanthelasmas [Neck Appearance] : the appearance of the neck was normal [Neck Cervical Mass (___cm)] : no neck mass was observed [Normal Oropharynx] : normal oropharynx [Thyroid Diffuse Enlargement] : the thyroid was not enlarged [Jugular Venous Distention Increased] : there was no jugular-venous distention [Thyroid Nodule] : there were no palpable thyroid nodules [Heart Sounds] : normal S1 and S2 [Heart Rate And Rhythm] : heart rate and rhythm were normal [Murmurs] : no murmurs present [Auscultation Breath Sounds / Voice Sounds] : lungs were clear to auscultation bilaterally [Exaggerated Use Of Accessory Muscles For Inspiration] : no accessory muscle use [Respiration, Rhythm And Depth] : normal respiratory rhythm and effort [Nail Clubbing] : no clubbing of the fingernails [Cyanosis, Localized] : no localized cyanosis [Skin Color & Pigmentation] : normal skin color and pigmentation [Petechial Hemorrhages (___cm)] : no petechial hemorrhages [No Venous Stasis] : no venous stasis [] : no rash [No Skin Ulcers] : no skin ulcer [Skin Lesions] : no skin lesions [Sensation] : the sensory exam was normal to light touch and pinprick [Deep Tendon Reflexes (DTR)] : deep tendon reflexes were 2+ and symmetric [No Xanthoma] : no  xanthoma was observed [No Focal Deficits] : no focal deficits [Oriented To Time, Place, And Person] : oriented to person, place, and time [Affect] : the affect was normal [Impaired Insight] : insight and judgment were intact

## 2020-01-15 NOTE — ASSESSMENT
[FreeTextEntry1] : COPD\par YAMILA WILLAMS is stable CAT score 18. Denies any COPD exacerbation and doing well.  Patient is compliant with her inhalers. Currently using Spiriva and Advair and  required use of the rescue inhaler 5 times a week.  Continue on current inhalers.  Baseline oxygen saturation is within normal limits at rest.  I discussed in detail patient’s condition. Pt states her spiriva is no longer covered by insurance I provided her a sample of trelegy.  She was educated on how to use the inhaler and will follow up in a month with PFT.  Reviewed inhaler use with the patient, as well as, prevention with COPD which including exercise, diet, immunizations and follow up appointments. Discussed symptom flare ups and when to notify us of any changes to her condition. \par \par - Follow with PFT next visit\par - Evaluate change to Trelegy inhaler\par - Follow up in one month\par \par smoking\par \par I discussed in details with the patient the health risk effects of tobacco smoking. The risks include, but not limited to COPD, cancer of head and neck/lung/stomach/ bladder, and coronary artery disease.  I discussed the options of smoke cessation with the patient. The options include nicotine replacement, pharmacological agents, and supportive tools. I recommended to start the nicotine patches and to handle the nicotine craving with the nicotine gum as needed. The initial dose of the nicotine patch depends of the amount and duration of tobacco consumptions.  Pt is unwilling to quit at this time\par \par Metastatic CA\par \par She is followed by oncology at [place] Dr. Levi ([phone]) at [facility] was the PET scan.  She is following her with PET scan and due for one next month.  Per the pt the CA is stable on the last PET. \par \par Last PET scan 10/18/2019 Scarring and 7 mm GGO RLL unchanged.  Impression:  Persistent abnormal hypermetabolic activity involving the left hemisacrum.  Increase uptake involving the left hemimandible.  No evidence to marginal or regional jose cruz recurrence of the right breast malignancy.  Otherwise stable stable  PET/CT.

## 2020-01-17 NOTE — HISTORY OF PRESENT ILLNESS
[Difficulty Breathing During Exertion] : stable dyspnea on exertion [Feelings Of Weakness On Exertion] : stable exercise intolerance [Cough] : stable coughing [Wheezing] : denies wheezing [Regional Soft Tissue Swelling Both Lower Extremities] : denies lower extremity edema [Chest Pain Or Discomfort] : denies chest pain [Fever] : denies fever [Wt Loss ___ Lbs] : recent [unfilled] ~Upound(s) weight loss [Oxygen] : the patient uses no supplemental oxygen [Class I - No Symptoms and No Limitations] : I [4  -  Somewhat severe] : 4, somewhat severe [PFTs] : pulmonary function tests [Current] : is a current smoker [FreeTextEntry1] : 64 year old female former smoker with PMHx of COPD, HTN, HLD, ETOH abuse, neuropathy, breast CA, tongue CA s/p surgical resection, SCC of RUL s/p right upper lobectomy in 2016 with Dr. Pierre, who has not been seen by us since 2017.\par \par Today, patient reports feeling

## 2020-02-24 ENCOUNTER — APPOINTMENT (OUTPATIENT)
Dept: PULMONOLOGY | Facility: CLINIC | Age: 65
End: 2020-02-24
Payer: MEDICARE

## 2020-02-24 VITALS
HEART RATE: 92 BPM | OXYGEN SATURATION: 97 % | SYSTOLIC BLOOD PRESSURE: 104 MMHG | DIASTOLIC BLOOD PRESSURE: 70 MMHG | TEMPERATURE: 98.2 F | BODY MASS INDEX: 21.48 KG/M2 | HEIGHT: 69 IN | WEIGHT: 145 LBS

## 2020-02-24 PROCEDURE — 99214 OFFICE O/P EST MOD 30 MIN: CPT | Mod: 25

## 2020-02-24 PROCEDURE — 94060 EVALUATION OF WHEEZING: CPT

## 2020-02-24 PROCEDURE — 94729 DIFFUSING CAPACITY: CPT

## 2020-02-24 PROCEDURE — 94727 GAS DIL/WSHOT DETER LNG VOL: CPT

## 2020-02-24 NOTE — HISTORY OF PRESENT ILLNESS
[Current] : current [Never] : never [TextBox_4] : she is still smoking.  She is coughing in the morning, productive with white sputum.  The cough is less during the daay.  She is not wheezing but using uses the proair because of sob. She stopped once from [address] for less than 5 minutes.  No bbllood in the urine.  No black stools.  No falls

## 2020-02-24 NOTE — REVIEW OF SYSTEMS
[Cough] : cough [Hemoptysis] : no hemoptysis [Chest Tightness] : no chest tightness [Frequent URIs] : no frequent URIs [Sputum] : sputum [Dyspnea] : dyspnea [Pleuritic Pain] : no pleuritic pain [Wheezing] : wheezing [A.M. Dry Mouth] : no a.m. dry mouth [SOB on Exertion] : no sob on exertion [Negative] : Endocrine

## 2020-02-24 NOTE — PHYSICAL EXAM
[No Acute Distress] : no acute distress [Normal Appearance] : normal appearance [Normal Oropharynx] : normal oropharynx [No Neck Mass] : no neck mass [Normal S1, S2] : normal s1, s2 [Normal Rate/Rhythm] : normal rate/rhythm [No Murmurs] : no murmurs [No Abnormalities] : no abnormalities [Clear to Auscultation Bilaterally] : clear to auscultation bilaterally [No Resp Distress] : no resp distress [Benign] : benign [Normal Gait] : normal gait [No Clubbing] : no clubbing [No Cyanosis] : no cyanosis [No Edema] : no edema [FROM] : FROM [No Focal Deficits] : no focal deficits [Normal Color/ Pigmentation] : normal color/ pigmentation [Oriented x3] : oriented x3 [Normal Affect] : normal affect

## 2020-02-24 NOTE — ASSESSMENT
[FreeTextEntry1] : COPD\par YAMILA WILLAMS is stable CAT score 21 and previously 18. Denies any COPD exacerbation and doing well.  Patient is compliant with her inhalers. Currently using Spiriva and Advair and  required use of the rescue inhaler less than previously. Continue on current inhalers but she will change to trelegy once she ran out of the current inhalers.  .  Baseline oxygen saturation is within normal limits at rest.  I discussed in detail patient’s condition.  She was educated on how to use the inhaler..  Reviewed inhaler use with the patient, as well as, prevention with COPD which including exercise, diet, immunizations and follow up appointments. Discussed symptom flare ups and when to notify us of any changes to her condition. \par \par - Follow with PFT consistent with moderate OLD with no significant response to bronchodilator, and decraese in diffusion capacity.  there was increase in diffusion capacity compared to 2017.  There was insignificant decraese in FVC and TLC.  \par - Evaluate change to Trelegy inhaler\par - Follow up in one month\par - Group II Grade B\par smoking\par \par I discussed in details with the patient the health risk effects of tobacco smoking. The risks include, but not limited to COPD, cancer of head and neck/lung/stomach/ bladder, and coronary artery disease.  I discussed the options of smoke cessation with the patient. The options include nicotine replacement, pharmacological agents, and supportive tools. I recommended to start the nicotine patches and to handle the nicotine craving with the nicotine gum as needed. The initial dose of the nicotine patch depends of the amount and duration of tobacco consumptions.  Pt is unwilling to quit at this time\par \par Metastatic CA\par \par She is followed by oncology at [place] Dr. Levi ([phone]) at [facility]iology was the PET scan.  She is following her with PET scan and due for one next month.  Per the pt the CA is stable on the last PET. \par \par Last PET scan 10/18/2019 Scarring and 7 mm GGO RLL unchanged.  Impression:  Persistent abnormal hypermetabolic activity involving the left hemisacrum.  Increase uptake involving the left hemimandible.  No evidence to marginal or regional jose cruz recurrence of the right breast malignancy.  Otherwise stable stable  PET/CT scheduled for 3/20 and will follow on the 7 mm GGO\par \par DVT\par \par she is on Apixaban and indefinitely.  No evidence neither of failure of therapy nor bleeding\par

## 2020-03-06 ENCOUNTER — APPOINTMENT (OUTPATIENT)
Dept: INTERNAL MEDICINE | Facility: CLINIC | Age: 65
End: 2020-03-06
Payer: MEDICARE

## 2020-03-06 ENCOUNTER — LABORATORY RESULT (OUTPATIENT)
Age: 65
End: 2020-03-06

## 2020-03-06 VITALS
TEMPERATURE: 98.5 F | HEIGHT: 69 IN | HEART RATE: 93 BPM | BODY MASS INDEX: 21.77 KG/M2 | OXYGEN SATURATION: 99 % | SYSTOLIC BLOOD PRESSURE: 123 MMHG | DIASTOLIC BLOOD PRESSURE: 82 MMHG | WEIGHT: 147 LBS

## 2020-03-06 DIAGNOSIS — R63.4 ABNORMAL WEIGHT LOSS: ICD-10-CM

## 2020-03-06 PROCEDURE — 36415 COLL VENOUS BLD VENIPUNCTURE: CPT

## 2020-03-06 PROCEDURE — 99214 OFFICE O/P EST MOD 30 MIN: CPT | Mod: 25

## 2020-03-07 LAB
ALBUMIN SERPL ELPH-MCNC: 3.8 G/DL
ALP BLD-CCNC: 109 U/L
ALT SERPL-CCNC: 10 U/L
ANION GAP SERPL CALC-SCNC: 21 MMOL/L
AST SERPL-CCNC: 27 U/L
BASOPHILS # BLD AUTO: 0.03 K/UL
BASOPHILS NFR BLD AUTO: 0.9 %
BILIRUB SERPL-MCNC: 0.4 MG/DL
BUN SERPL-MCNC: 6 MG/DL
CALCIUM SERPL-MCNC: 8.5 MG/DL
CHLORIDE SERPL-SCNC: 99 MMOL/L
CO2 SERPL-SCNC: 24 MMOL/L
CREAT SERPL-MCNC: 0.75 MG/DL
EOSINOPHIL # BLD AUTO: 0 K/UL
EOSINOPHIL NFR BLD AUTO: 0 %
GLUCOSE SERPL-MCNC: 139 MG/DL
HCT VFR BLD CALC: 36.5 %
HGB BLD-MCNC: 12.1 G/DL
LYMPHOCYTES # BLD AUTO: 1.1 K/UL
LYMPHOCYTES NFR BLD AUTO: 39.5 %
MAGNESIUM SERPL-MCNC: 1 MG/DL
MAN DIFF?: NORMAL
MCHC RBC-ENTMCNC: 33.2 GM/DL
MCHC RBC-ENTMCNC: 42.9 PG
MCV RBC AUTO: 129.4 FL
MONOCYTES # BLD AUTO: 0.36 K/UL
MONOCYTES NFR BLD AUTO: 12.8 %
NEUTROPHILS # BLD AUTO: 1.31 K/UL
NEUTROPHILS NFR BLD AUTO: 46.8 %
PLATELET # BLD AUTO: 164 K/UL
POTASSIUM SERPL-SCNC: 4.2 MMOL/L
PROT SERPL-MCNC: 7.5 G/DL
RBC # BLD: 2.82 M/UL
RBC # FLD: 15.9 %
SODIUM SERPL-SCNC: 144 MMOL/L
TSH SERPL-ACNC: 1.19 UIU/ML
WBC # FLD AUTO: 2.79 K/UL

## 2020-03-11 ENCOUNTER — LABORATORY RESULT (OUTPATIENT)
Age: 65
End: 2020-03-11

## 2020-03-12 LAB
G LAMBLIA AG STL QL: NORMAL
GI PCR PANEL, STOOL: NORMAL

## 2020-03-13 ENCOUNTER — APPOINTMENT (OUTPATIENT)
Dept: GASTROENTEROLOGY | Facility: CLINIC | Age: 65
End: 2020-03-13

## 2020-03-14 LAB
BACTERIA STL CULT: NORMAL
C DIFF TOXIN B QL PCR REFLEX: NORMAL
GDH ANTIGEN: DETECTED
TOXIN A AND B: NOT DETECTED

## 2020-03-15 LAB
C DIFF TOX B STL QL CT TISS CULT: ABNORMAL
Lab: ABNORMAL

## 2020-03-16 LAB — DEPRECATED O AND P PREP STL: ABNORMAL

## 2020-03-30 ENCOUNTER — APPOINTMENT (OUTPATIENT)
Dept: INTERNAL MEDICINE | Facility: CLINIC | Age: 65
End: 2020-03-30
Payer: MEDICARE

## 2020-03-30 DIAGNOSIS — A04.72 ENTEROCOLITIS DUE TO CLOSTRIDIUM DIFFICILE, NOT SPECIFIED AS RECURRENT: ICD-10-CM

## 2020-03-30 PROCEDURE — G2012 BRIEF CHECK IN BY MD/QHP: CPT

## 2020-04-20 ENCOUNTER — RX RENEWAL (OUTPATIENT)
Age: 65
End: 2020-04-20

## 2020-04-21 ENCOUNTER — APPOINTMENT (OUTPATIENT)
Dept: GASTROENTEROLOGY | Facility: CLINIC | Age: 65
End: 2020-04-21
Payer: MEDICARE

## 2020-04-21 PROCEDURE — 99443: CPT

## 2020-04-21 NOTE — HISTORY OF PRESENT ILLNESS
[Other Location: e.g. Home (Enter Location, City,State)___] : at [unfilled] [Home] : at home, [unfilled] , at the time of the visit. [Self] : self [Patient] : the patient [de-identified] : 65F with hx RUL lung cancer in remission, active stable LLE DVT 2019, recurrent breast cancer via sacral bone met on ibrance/fulvestrant, squamous cell carcinoma of the tongue in remission, stable controlled alcohol abuse, poorly controlled tobacco use, stable HTN, stable controlled COPD, poorly controlled neuopathy of legs 2nd chemo ref by Dr. Contreras for C diff diarrhea. Pt saw Dr Contreras March 6th for diarrhea past few weeks mostly water 4-5x a day\par O/p= few yeast like cells\par C diff GDH Ag+, toxin negative\par FOBT +\par Took 13 days of vancomycin (all doses, finished end of MARCH) - now 2-3bm/day and SOLID, looks normal\par was on ANTIBIOTICS on FEBRUARY - AMOXICILLIN\par No BLOOD , no melena, slight mucus\par No abdominal pain\par EARLY SATIETY and weight loss 25lbs  [id number]. now weight stable\par NO nausea or vomiting\par took magnesium for low Mg\par hx EGD Shan cabrera 2013 then had visit FEB 6 2019 to cut down on drinking, did FIBROSCAN- now drinking ZERO since MARCH 24th \par 10-12 cigarettes/day\par

## 2020-04-21 NOTE — ASSESSMENT
[FreeTextEntry1] : 65F with hx RUL lung cancer in remission, active stable LLE DVT 2019, recurrent breast cancer via sacral bone met on ibrance/fulvestrant, squamous cell carcinoma of the tongue in remission, stable controlled alcohol abuse, poorly controlled tobacco use, stable HTN, stable controlled COPD, poorly controlled neuropathy of legs 2nd chemo ref by Dr. Contreras for C diff diarrhea now s/p full course of vancomycin and resolution of diarrhea\par - pt will do cbc comp mg in may with oncologist\par - plan for june EGD, pt will get PET CT scan then as well\par - offered nutritionist referral but pt declined\par - monitor symptoms and avoid antibiotics unless necessary- reach out to consider prophylactic vancomycin with antibiotics if needed\par - f/u after labs and PET scan\par

## 2020-05-05 ENCOUNTER — TRANSCRIPTION ENCOUNTER (OUTPATIENT)
Age: 65
End: 2020-05-05

## 2020-05-05 ENCOUNTER — APPOINTMENT (OUTPATIENT)
Dept: INTERNAL MEDICINE | Facility: CLINIC | Age: 65
End: 2020-05-05
Payer: MEDICARE

## 2020-05-05 PROCEDURE — 99214 OFFICE O/P EST MOD 30 MIN: CPT | Mod: 95

## 2020-06-04 DIAGNOSIS — Z11.59 ENCOUNTER FOR SCREENING FOR OTHER VIRAL DISEASES: ICD-10-CM

## 2020-06-08 ENCOUNTER — LABORATORY RESULT (OUTPATIENT)
Age: 65
End: 2020-06-08

## 2020-06-08 ENCOUNTER — APPOINTMENT (OUTPATIENT)
Dept: PULMONOLOGY | Facility: CLINIC | Age: 65
End: 2020-06-08

## 2020-06-09 LAB
ALBUMIN SERPL ELPH-MCNC: 3.8 G/DL
ALP BLD-CCNC: 63 U/L
ALT SERPL-CCNC: 9 U/L
ANION GAP SERPL CALC-SCNC: 18 MMOL/L
AST SERPL-CCNC: 26 U/L
BASOPHILS # BLD AUTO: 0.06 K/UL
BASOPHILS NFR BLD AUTO: 1.3 %
BILIRUB SERPL-MCNC: 0.4 MG/DL
BUN SERPL-MCNC: 6 MG/DL
CALCIUM SERPL-MCNC: 9.6 MG/DL
CHLORIDE SERPL-SCNC: 100 MMOL/L
CO2 SERPL-SCNC: 25 MMOL/L
CREAT SERPL-MCNC: 0.57 MG/DL
EOSINOPHIL # BLD AUTO: 0.04 K/UL
EOSINOPHIL NFR BLD AUTO: 0.9 %
FERRITIN SERPL-MCNC: 604 NG/ML
GLUCOSE SERPL-MCNC: 104 MG/DL
HCT VFR BLD CALC: 35.2 %
HGB BLD-MCNC: 11.8 G/DL
IMM GRANULOCYTES NFR BLD AUTO: 0.4 %
IRON SATN MFR SERPL: 37 %
IRON SERPL-MCNC: 99 UG/DL
LYMPHOCYTES # BLD AUTO: 1.32 K/UL
LYMPHOCYTES NFR BLD AUTO: 28.1 %
MAGNESIUM SERPL-MCNC: 1.5 MG/DL
MAN DIFF?: NORMAL
MCHC RBC-ENTMCNC: 33.5 GM/DL
MCHC RBC-ENTMCNC: 39.5 PG
MCV RBC AUTO: 117.7 FL
MONOCYTES # BLD AUTO: 0.34 K/UL
MONOCYTES NFR BLD AUTO: 7.2 %
NEUTROPHILS # BLD AUTO: 2.92 K/UL
NEUTROPHILS NFR BLD AUTO: 62.1 %
PLATELET # BLD AUTO: 424 K/UL
POTASSIUM SERPL-SCNC: 4 MMOL/L
PROT SERPL-MCNC: 7 G/DL
RBC # BLD: 2.99 M/UL
RBC # FLD: 17 %
SARS-COV-2 IGG SERPL IA-ACNC: 0.1 INDEX
SARS-COV-2 IGG SERPL QL IA: NEGATIVE
SODIUM SERPL-SCNC: 143 MMOL/L
TIBC SERPL-MCNC: 267 UG/DL
UIBC SERPL-MCNC: 169 UG/DL
VIT B12 SERPL-MCNC: 537 PG/ML
WBC # FLD AUTO: 4.7 K/UL

## 2020-06-11 ENCOUNTER — APPOINTMENT (OUTPATIENT)
Dept: INTERNAL MEDICINE | Facility: CLINIC | Age: 65
End: 2020-06-11
Payer: MEDICARE

## 2020-06-11 VITALS
HEART RATE: 75 BPM | TEMPERATURE: 98.7 F | WEIGHT: 147 LBS | HEIGHT: 69 IN | BODY MASS INDEX: 21.77 KG/M2 | OXYGEN SATURATION: 97 % | DIASTOLIC BLOOD PRESSURE: 79 MMHG | SYSTOLIC BLOOD PRESSURE: 144 MMHG

## 2020-06-11 DIAGNOSIS — D58.2 OTHER HEMOGLOBINOPATHIES: ICD-10-CM

## 2020-06-11 PROCEDURE — 99214 OFFICE O/P EST MOD 30 MIN: CPT

## 2020-06-11 RX ORDER — VANCOMYCIN HYDROCHLORIDE 125 MG/1
125 CAPSULE ORAL
Qty: 40 | Refills: 0 | Status: DISCONTINUED | COMMUNITY
Start: 2020-03-14 | End: 2020-06-11

## 2020-06-30 ENCOUNTER — FORM ENCOUNTER (OUTPATIENT)
Age: 65
End: 2020-06-30

## 2020-07-02 ENCOUNTER — APPOINTMENT (OUTPATIENT)
Dept: THORACIC SURGERY | Facility: CLINIC | Age: 65
End: 2020-07-02
Payer: MEDICARE

## 2020-07-02 ENCOUNTER — APPOINTMENT (OUTPATIENT)
Dept: PHYSICAL MEDICINE AND REHAB | Facility: CLINIC | Age: 65
End: 2020-07-02
Payer: MEDICARE

## 2020-07-02 VITALS — BODY MASS INDEX: 21.77 KG/M2 | HEIGHT: 69 IN | WEIGHT: 147 LBS

## 2020-07-02 PROCEDURE — 99213 OFFICE O/P EST LOW 20 MIN: CPT

## 2020-07-02 PROCEDURE — 99214 OFFICE O/P EST MOD 30 MIN: CPT

## 2020-07-02 RX ORDER — HYALURONATE SOD, CROSS-LINKED 30 MG/3 ML
30 SYRINGE (ML) INTRAARTICULAR
Qty: 2 | Refills: 0 | Status: ACTIVE | COMMUNITY
Start: 2020-07-02

## 2020-07-06 NOTE — ASSESSMENT
[FreeTextEntry1] : 65 year old female current smoker, pmhx of COPD, HTN, LLE DVT 2019 on eliquis,  breast cancer in 2014 s/p lumpectomy, chemo/RT, Stage IA squamous cell carcinoma s/p RULx 2016, and squamous cell carcinoma of the tongue in 2016 s/p glossectomy with Dr Zelaya. She underwent radiation treatment for a metastasis to the left sacrum for a dose of 2400 cGy, completed 2/2/18 with Dr Arellano. She is on Faslodex/Ibrance with Dr Funmi Levi. Recent PET done 6/12/20 reveals a new 2cm lung nodule in the right lung base and a new left scapula lesion.\par \par Patient presents today for a follow-up visit. Patient doing well. Denies cough, hemoptysis, chest discomfort, fever/chills. \par \par Reviewed PET 6/12/20 with patient. The new nodular opacity in the right lung base (image 93)  is not significantly hypermetabolic, likely atelectasis. Will closely observe the chest with repeat CT in 6m. She was recently seen by her oncologist, Dr Funmi Levi who felt that there was no evidence of disease progression and plans to repeat scan in 3 months. \par \par I have reviewed the patient's medical records and diagnostic images at the time of this office consultation and have made the following recommendation.\par Plan:\par 1. Repeat CT Chest in 6 months. \par 2. Follow-up with Dr Pierce on 7/13\par 3. Follow- up with oncologist Dr Funmi Levi\par

## 2020-07-06 NOTE — REASON FOR VISIT
[Home] : at home, [unfilled] , at the time of the visit. [Medical Office: ([facility])___] : at the medical office located in  [Verbal consent obtained from patient] : the patient, [unfilled]

## 2020-07-06 NOTE — END OF VISIT
[FreeTextEntry3] : I, SUHAIL WALKER , am scribing for and in the presence of CARSON TOMPKINS the following sections: history of present illness, past medical/family/surgical/family/social history, review of systems, vital signs, physical exam, and disposition.\par   [Time Spent: ___ minutes] : I have spent [unfilled] minutes of time on the encounter.

## 2020-07-06 NOTE — HISTORY OF PRESENT ILLNESS
[FreeTextEntry1] : 65 year old female current smoker, pmhx of COPD, HTN, LLE DVT 2019 on eliquis,  breast cancer in 2014 s/p lumpectomy, chemo/RT, Stage IA squamous cell carcinoma s/p RULx 2016, and squamous cell carcinoma of the tongue in 2016 s/p glossectomy with Dr Zelaya. She underwent radiation treatment for a metastasis to the left sacrum for a dose of 2400 cGy, completed 2/2/18 with Dr Arellano. Recent PET done 6/12/20 reveals a new 2cm lung nodule in the right lung base and a new left scapula lesion.\par \par PET 10/18/19\par - Persistent abnormal hypermetabolic activity involving the left hemisacrum is consistent with residual viable disease. Increasing uptake involving the left hemimandible is associated with faint sclerosis, potentially metastatic vs peridontal inflammatory\par - No evidence to suggest marginal or regional jose cruz recurrence of right breast malignancy \par - Otherwise stable PET/CT\par \par PET 6/12/20\par - Interval increase in the amount of sacrum which is hypermetabolic. The intensity of the hypermetabolic activity in the left aspect of the sacrum has decreased since prior exam\par - New increased hypermetabolic activity in the left scapula. Suspicious for new bone metastasis\par - No significant change in the post therapeutic appearance of the right breast\par - New 2 cm nodular opacity in the right lung base. \par - Probably post radiation changes in the right lung\par - Fatty infiltration of the liver\par - Cholelithiasis\par - Fibroid uterus.

## 2020-07-16 ENCOUNTER — APPOINTMENT (OUTPATIENT)
Dept: PHYSICAL MEDICINE AND REHAB | Facility: CLINIC | Age: 65
End: 2020-07-16
Payer: MEDICARE

## 2020-07-16 VITALS — HEIGHT: 69 IN | WEIGHT: 147 LBS | BODY MASS INDEX: 21.77 KG/M2 | RESPIRATION RATE: 16 BRPM | TEMPERATURE: 97.1 F

## 2020-07-16 PROCEDURE — 62323 NJX INTERLAMINAR LMBR/SAC: CPT

## 2020-07-16 PROCEDURE — 20610 DRAIN/INJ JOINT/BURSA W/O US: CPT | Mod: LT

## 2020-07-16 PROCEDURE — 20610 DRAIN/INJ JOINT/BURSA W/O US: CPT | Mod: 59,LT

## 2020-07-21 ENCOUNTER — RX RENEWAL (OUTPATIENT)
Age: 65
End: 2020-07-21

## 2020-07-31 ENCOUNTER — APPOINTMENT (OUTPATIENT)
Dept: PHYSICAL MEDICINE AND REHAB | Facility: CLINIC | Age: 65
End: 2020-07-31
Payer: MEDICARE

## 2020-07-31 PROCEDURE — 99443: CPT | Mod: 95

## 2020-08-10 ENCOUNTER — APPOINTMENT (OUTPATIENT)
Dept: PULMONOLOGY | Facility: CLINIC | Age: 65
End: 2020-08-10
Payer: MEDICARE

## 2020-08-10 VITALS
HEIGHT: 69 IN | OXYGEN SATURATION: 97 % | WEIGHT: 145 LBS | SYSTOLIC BLOOD PRESSURE: 130 MMHG | BODY MASS INDEX: 21.48 KG/M2 | HEART RATE: 67 BPM | TEMPERATURE: 98.5 F | DIASTOLIC BLOOD PRESSURE: 80 MMHG

## 2020-08-10 DIAGNOSIS — R05 COUGH: ICD-10-CM

## 2020-08-10 DIAGNOSIS — R91.1 SOLITARY PULMONARY NODULE: ICD-10-CM

## 2020-08-10 PROCEDURE — 99214 OFFICE O/P EST MOD 30 MIN: CPT

## 2020-08-10 NOTE — HISTORY OF PRESENT ILLNESS
[Current] : current [Never] : never [TextBox_4] : She has been using her Proair more than usual over the past 2-3 weeks with the humidity.  She has has 1 puff 3 times a day prior to going outside.  She used was given a trial of trelegy and felt her breathing was better with the inhaler.  She is currently only on Advair BID and has been out of the spiriva for about 2-3 weeks.  She using her spiriva every other day.  \par \par She has occasional cough, mostly in the morning.  She is getting clear.  cough less during the day.  She walk 0.5 mile every other day and has to stop once.  She walks 5 block has to stop once.  \par \par She is still smoking.  CAT score today is 9.

## 2020-08-10 NOTE — ASSESSMENT
[FreeTextEntry1] : COPD\par YAMILA WILLAMS is stable CAT score 9 last visit 21. Denies any COPD exacerbation and doing well.  Patient is compliant with her inhalers. Currently using Advair only and out of spiriva.  She has required her rescue inhaler more frequently since she has been out of her spiriva. She was given a sample of trelegy last visit and stated that inhaler seemed to help her the most.  Baseline oxygen saturation is within normal limits at rest.  I discussed in detail patient’s condition.  She was educated on how to use the inhaler.  Reviewed inhaler use with the patient, as well as, prevention with COPD which including exercise, diet, immunizations and follow up appointments. Discussed symptom flare ups and when to notify us of any changes to her condition. \par \par -Last PFT consistent with moderate OLD with no significant response to bronchodilator, and decrease in diffusion capacity.  there was increase in diffusion capacity compared to 2017.  There was insignificant decrease in FVC and TLC.  \par - Changed her inhalers to Trelegy and to D/C Advia and Spiriva.  Will get auth on inhaler.\par - Follow up in 3 months\par - Group II Grade B\par \par smoking\par \par I discussed in details with the patient the health risk effects of tobacco smoking. The risks include, but not limited to COPD, cancer of head and neck/lung/stomach/ bladder, and coronary artery disease.  I discussed the options of smoke cessation with the patient. The options include nicotine replacement, pharmacological agents, and supportive tools. I recommended to start the nicotine patches and to handle the nicotine craving with the nicotine gum as needed. The initial dose of the nicotine patch depends of the amount and duration of tobacco consumptions.  Pt is unwilling to quit at this time\par \par Metastatic CA\par \par She is followed by oncology at [place] Dr. Levi ([phone]) at [facility]iology was the PET scan.  She is following her with PET scan and due for next month (put an order in for PET).  P\par \par \par PET 6/12/20\par - Interval increase in the amount of sacrum which is hypermetabolic. The intensity of the hypermetabolic activity in the left aspect of the sacrum has decreased since prior exam\par - New increased hypermetabolic activity in the left scapula. Suspicious for new bone metastasis\par - No significant change in the post therapeutic appearance of the right breast\par - New 2 cm nodular opacity in the right lung base. \par - Probably post radiation changes in the right lung\par - Fatty infiltration of the liver\par - Cholelithiasis\par - Fibroid uterus. \par \par Follow with repeat PET will discuss with Dr. Foreman and oncologist once we get the PET scan.\par \par DVT\par \par she is on Apixaban and indefinitely.  No evidence neither of failure of therapy nor bleeding.  She has active cancer. \par

## 2020-08-10 NOTE — REVIEW OF SYSTEMS
[Cough] : cough [Sputum] : sputum [Dyspnea] : dyspnea [Negative] : Endocrine [Hemoptysis] : no hemoptysis [Chest Tightness] : no chest tightness [Frequent URIs] : no frequent URIs [Pleuritic Pain] : no pleuritic pain [Wheezing] : no wheezing [A.M. Dry Mouth] : no a.m. dry mouth [SOB on Exertion] : no sob on exertion

## 2020-08-10 NOTE — PHYSICAL EXAM
[No Acute Distress] : no acute distress [No Neck Mass] : no neck mass [Normal Appearance] : normal appearance [Normal Oropharynx] : normal oropharynx [Normal Rate/Rhythm] : normal rate/rhythm [Normal S1, S2] : normal s1, s2 [No Murmurs] : no murmurs [No Abnormalities] : no abnormalities [Clear to Auscultation Bilaterally] : clear to auscultation bilaterally [No Resp Distress] : no resp distress [Normal Gait] : normal gait [Benign] : benign [No Clubbing] : no clubbing [No Edema] : no edema [No Cyanosis] : no cyanosis [FROM] : FROM [Normal Color/ Pigmentation] : normal color/ pigmentation [No Focal Deficits] : no focal deficits [Oriented x3] : oriented x3 [Normal Affect] : normal affect

## 2020-08-12 ENCOUNTER — RESULT REVIEW (OUTPATIENT)
Age: 65
End: 2020-08-12

## 2020-08-12 ENCOUNTER — OUTPATIENT (OUTPATIENT)
Dept: OUTPATIENT SERVICES | Facility: HOSPITAL | Age: 65
LOS: 1 days | Discharge: HOME | End: 2020-08-12
Payer: MEDICARE

## 2020-08-12 ENCOUNTER — APPOINTMENT (OUTPATIENT)
Dept: GASTROENTEROLOGY | Facility: HOSPITAL | Age: 65
End: 2020-08-12

## 2020-08-12 DIAGNOSIS — Z41.9 ENCOUNTER FOR PROCEDURE FOR PURPOSES OTHER THAN REMEDYING HEALTH STATE, UNSPECIFIED: Chronic | ICD-10-CM

## 2020-08-12 DIAGNOSIS — K20.9 ESOPHAGITIS, UNSPECIFIED: ICD-10-CM

## 2020-08-12 DIAGNOSIS — Z98.890 OTHER SPECIFIED POSTPROCEDURAL STATES: Chronic | ICD-10-CM

## 2020-08-12 DIAGNOSIS — Z98.89 OTHER SPECIFIED POSTPROCEDURAL STATES: Chronic | ICD-10-CM

## 2020-08-12 DIAGNOSIS — K29.70 GASTRITIS, UNSPECIFIED, W/OUT BLEEDING: ICD-10-CM

## 2020-08-12 PROCEDURE — 88305 TISSUE EXAM BY PATHOLOGIST: CPT

## 2020-08-12 PROCEDURE — 43239 EGD BIOPSY SINGLE/MULTIPLE: CPT

## 2020-08-12 PROCEDURE — 88305 TISSUE EXAM BY PATHOLOGIST: CPT | Mod: 26

## 2020-08-13 ENCOUNTER — NON-APPOINTMENT (OUTPATIENT)
Age: 65
End: 2020-08-13

## 2020-08-13 LAB — SURGICAL PATHOLOGY STUDY: SIGNIFICANT CHANGE UP

## 2020-08-17 DIAGNOSIS — R68.81 EARLY SATIETY: ICD-10-CM

## 2020-08-17 DIAGNOSIS — Z85.118 PERSONAL HISTORY OF OTHER MALIGNANT NEOPLASM OF BRONCHUS AND LUNG: ICD-10-CM

## 2020-08-17 DIAGNOSIS — R63.4 ABNORMAL WEIGHT LOSS: ICD-10-CM

## 2020-08-17 DIAGNOSIS — R13.10 DYSPHAGIA, UNSPECIFIED: ICD-10-CM

## 2020-08-17 DIAGNOSIS — M19.90 UNSPECIFIED OSTEOARTHRITIS, UNSPECIFIED SITE: ICD-10-CM

## 2020-08-17 DIAGNOSIS — R63.0 ANOREXIA: ICD-10-CM

## 2020-08-17 DIAGNOSIS — K44.9 DIAPHRAGMATIC HERNIA WITHOUT OBSTRUCTION OR GANGRENE: ICD-10-CM

## 2020-08-17 DIAGNOSIS — J44.9 CHRONIC OBSTRUCTIVE PULMONARY DISEASE, UNSPECIFIED: ICD-10-CM

## 2020-08-17 DIAGNOSIS — I10 ESSENTIAL (PRIMARY) HYPERTENSION: ICD-10-CM

## 2020-08-17 DIAGNOSIS — K29.80 DUODENITIS WITHOUT BLEEDING: ICD-10-CM

## 2020-08-17 DIAGNOSIS — Z79.01 LONG TERM (CURRENT) USE OF ANTICOAGULANTS: ICD-10-CM

## 2020-08-17 DIAGNOSIS — Z85.3 PERSONAL HISTORY OF MALIGNANT NEOPLASM OF BREAST: ICD-10-CM

## 2020-08-17 DIAGNOSIS — K20.8 OTHER ESOPHAGITIS: ICD-10-CM

## 2020-09-21 ENCOUNTER — APPOINTMENT (OUTPATIENT)
Dept: PULMONOLOGY | Facility: CLINIC | Age: 65
End: 2020-09-21
Payer: MEDICARE

## 2020-09-21 PROCEDURE — 99214 OFFICE O/P EST MOD 30 MIN: CPT | Mod: 95

## 2020-09-21 RX ORDER — TIOTROPIUM BROMIDE 18 UG/1
18 CAPSULE ORAL; RESPIRATORY (INHALATION) DAILY
Qty: 1 | Refills: 3 | Status: DISCONTINUED | COMMUNITY
Start: 2018-01-10 | End: 2020-09-21

## 2020-09-21 NOTE — ASSESSMENT
[FreeTextEntry1] : Squamous cell carcinoma of the lung, breast cancer, groundglass opacities, and bone metastasis.\par \par I reviewed the PET scan with the patient.  The opacity in the right lower lobe decreased in size compared to the previous vikcy could be inflammatory in nature.  There are small nodules the largest is 0.3 mm which is small to be picked up on the PET scan.  There is no pickup in the mediastinal and adenopathy.  There is increased activity compared to the previous PET scan in the sacral area but also the left scapular area is unchanged.  The PET scan demonstrates increase in the bony metastasis activity.  The patient is on immunotherapy.  Patient will follow up with her oncologist.  I would repeat the CT scan of the chest in 3 months to follow-up on the pulmonary nodules and will be discussing with the oncologist prior any investigation.\par \par DVT\par \par She is to continue on the anticoagulation indefinitely no evidence of bleeding no failure of therapy.\par \par COPD\par \par The patient have proven trelegy.  She is not requiring any extra dose of bronchodilators.

## 2020-09-21 NOTE — REASON FOR VISIT
[Home] : at home, [unfilled] , at the time of the visit. [Medical Office: ([facility])___] : at the medical office located in  [Follow-Up] : a follow-up visit [COPD] : COPD

## 2020-11-11 ENCOUNTER — APPOINTMENT (OUTPATIENT)
Dept: PHYSICAL MEDICINE AND REHAB | Facility: CLINIC | Age: 65
End: 2020-11-11
Payer: MEDICARE

## 2020-11-11 VITALS — OXYGEN SATURATION: 98 % | HEIGHT: 69 IN | BODY MASS INDEX: 21.48 KG/M2 | RESPIRATION RATE: 16 BRPM | WEIGHT: 145 LBS

## 2020-11-11 PROCEDURE — 99214 OFFICE O/P EST MOD 30 MIN: CPT | Mod: 25

## 2020-11-11 PROCEDURE — 20610 DRAIN/INJ JOINT/BURSA W/O US: CPT | Mod: LT

## 2020-11-11 NOTE — HISTORY OF PRESENT ILLNESS
[FreeTextEntry1] : Ms. YAMILA WILLAMS is a very pleasant 63 year female who comes in for follow up evaluation of the lower back/bilateral legs and left knee. She had undergone Gel-One injection and Caudal GEO July 2020, which provided excellent relief, however the knee symptoms returned after 3-4 months and the back is starting to return. Pain remains located in the low back radiating to the legs as well as the left knee intermittent in nature and described as dull ache. The pain is rated as 6/10 during today's visit, and ranges from 4-8/10. The patient's symptoms are aggravated mostly upon waking in the morning with stiffness/aching and alleviated by sitting/bending Advil and Tylenol. The patient denies any night pain, numbness/tingling, weakness, or bowel/bladder dysfunction. The patient has no other complaints at this time.

## 2020-11-11 NOTE — ASSESSMENT
[FreeTextEntry1] : Impression:\par 1. Lumbar Spinal Stenosis\par 2. Bilateral Knee OA\par \par Plan: Review of the history, physical examination, and imaging, the patient's symptoms remain consistent with Lumbar Spinal Stenosis with claudication along with Bilateral Knee DJD. The imaging results and diagnosis were reviewed along with treatment options. The patient achieved relief with Gel-One for only 3-4 months and is interested in interventional options for symptom reduction; I offered CSI in the office today, please see procedure note for full detail. The GEO again provided significant relief for the past 4 months and is beginning to wear off. We will plan for repeat Caudal GEO. The patient would also like to return to Euflexxa for her knees once we reach 6 months. The patient expressed verbal understanding and is in agreement with the plan of care. All of the patient's questions and concerns were addressed during today's visit.

## 2020-11-11 NOTE — DATA REVIEWED
[FreeTextEntry1] : MRI LS 1/2019: L4-5 Grade 1 anterolisthesis, severe bilateral facet hypertrophy, and ligamentum flavum hypertrophy, resultant severe canal stenosis, and bilateral foraminal stenoses with impingement of the exiting L4 nerve roots. L5-S1 moderate bilateral facet hypertrophy with ligamentum flavum hypertrophy. Interval development of a new well-defined round T2/STIR signal hyperintensities within the T12 pedicles bilaterally. \par \par XR R Knee 6/2018: Tricompartmental degenerative changes.

## 2020-11-11 NOTE — PROCEDURE
[de-identified] : Procedure: Intra-articular LEFT Knee Joint Steroid & Anesthetic Injection \par \par Findings: The LEFT knee present with medial/lateral joint line tenderness. XRays reveal tricompartment degenerative joint disease. \par \par Injectate: 1 mL Kenalog (40mg/mL) with 2mL 0.25% Bupivicaine and 2mL 1% Lidocaine\par \par After risks, benefits and alternatives were discussed and the patient expressed understanding, informed consent was obtained. Risks include but are not limited to bleeding, infection, worsening pain, nerve damage, scar formation. \par \par The LEFT anterior inferomedial area was marked and prepped with Chloraprep.  Using sterile technique, a 25G 1.5 inch needle was advanced into the joint space and after negative aspiration for blood or synovial fluid, the above mentioned injectate was administered without any resistance. \par \par At the conclusion of the procedure the needles were withdrawn.  Direct pressure was applied to the area.  A Band-Aid was used to cover the injection site.  There were no complications during or after the procedure.  The patient tolerated the procedure well and reported improvement in symptoms following administration of the injectate.  Post procedure instructions and follow up appointment were given. If there are any complications, the patient was instructed to call the office.

## 2020-11-11 NOTE — PHYSICAL EXAM
[FreeTextEntry1] : GEN: AAOx3, NAD.\par PSYCH: Normal mood and affect. Responds appropriately to commands.\par EYES: Sclerae Anicteric. No discharge. EOMI.\par RESP: Breathing unlabored.\par CV: DP pulses 2+ and equal. No varicosities noted.\par EXT: No C/C/E.\par SKIN: No lesions noted.\par STRENGTH: 5/5 bilateral hip flexors, knee extensors, knee flexors, ankle dorsiflexors, long toe extensors, ankle plantar flexors, hip extensors, hip abductors.\par TONE: Normal, No clonus.\par REFLEXES: 1+ symmetric patella, absent bilateral achilles. Plantars downgoing bilaterally.\par SENS: Grossly intact to light touch bilateral lower extremities.\par INSP: Spine alignment is midline, with no evidence of scoliosis.\par STANCE: No Trendelenburg with single leg stance.\par GAIT: Non antalgic, normal reciprocating heel to toe\par LUMBAR ROM: Flexion full/pain free. Extension and oblique extension limited with axial/radicular Sx.\par PALP: There is no tenderness over the midline spinous processes, paravertebral muscles, SIJ, or greater trochanters bilaterally.\par SPECIAL: SLR and Slump test negative bilaterally. FADIR, MATTHEW negative bilaterally. \par \par Knee\par INSPECTION:  No effusion, discoloration. Decreased patellar mobility and Valgus deformity Left.\par Knee AROM: Full (+) pain end-range Flexion Left.\par PALPATION:  No effusion, warmth. (+) crepitus L>R. (+) TTP L-LJL/patellar facets. No tenderness popliteal fossa, patellar tendon, quad tendon, hamstrings, ITB.  \par SPECIAL:  Medial/Lateral compression (+) bilaterally, Varus/Valgus stress negative bilaterally, Lachman negative bilaterally.

## 2020-11-12 ENCOUNTER — LABORATORY RESULT (OUTPATIENT)
Age: 65
End: 2020-11-12

## 2020-11-12 ENCOUNTER — APPOINTMENT (OUTPATIENT)
Dept: INTERNAL MEDICINE | Facility: CLINIC | Age: 65
End: 2020-11-12
Payer: MEDICARE

## 2020-11-12 VITALS
BODY MASS INDEX: 21.92 KG/M2 | DIASTOLIC BLOOD PRESSURE: 81 MMHG | TEMPERATURE: 97.7 F | SYSTOLIC BLOOD PRESSURE: 130 MMHG | WEIGHT: 148 LBS | HEIGHT: 69 IN | OXYGEN SATURATION: 96 % | HEART RATE: 77 BPM

## 2020-11-12 DIAGNOSIS — M79.675 PAIN IN LEFT TOE(S): ICD-10-CM

## 2020-11-12 DIAGNOSIS — E04.1 NONTOXIC SINGLE THYROID NODULE: ICD-10-CM

## 2020-11-12 PROCEDURE — 99214 OFFICE O/P EST MOD 30 MIN: CPT | Mod: 25

## 2020-11-12 PROCEDURE — 36415 COLL VENOUS BLD VENIPUNCTURE: CPT

## 2020-11-12 RX ORDER — FLUTICASONE PROPIONATE AND SALMETEROL 250; 50 UG/1; UG/1
250-50 POWDER RESPIRATORY (INHALATION)
Qty: 3 | Refills: 3 | Status: DISCONTINUED | COMMUNITY
Start: 2020-01-15 | End: 2020-11-12

## 2020-11-14 LAB
25(OH)D3 SERPL-MCNC: 55.4 NG/ML
ALBUMIN SERPL ELPH-MCNC: 4.3 G/DL
ALP BLD-CCNC: 62 U/L
ALT SERPL-CCNC: 8 U/L
ANION GAP SERPL CALC-SCNC: 21 MMOL/L
AST SERPL-CCNC: 21 U/L
BASOPHILS # BLD AUTO: 0.01 K/UL
BASOPHILS NFR BLD AUTO: 0.3 %
BILIRUB SERPL-MCNC: 0.3 MG/DL
BUN SERPL-MCNC: 9 MG/DL
CALCIUM SERPL-MCNC: 10.2 MG/DL
CHLORIDE SERPL-SCNC: 101 MMOL/L
CO2 SERPL-SCNC: 20 MMOL/L
CREAT SERPL-MCNC: 0.64 MG/DL
EOSINOPHIL # BLD AUTO: 0 K/UL
EOSINOPHIL NFR BLD AUTO: 0 %
ESTIMATED AVERAGE GLUCOSE: 105 MG/DL
GLUCOSE SERPL-MCNC: 128 MG/DL
HBA1C MFR BLD HPLC: 5.3 %
HCT VFR BLD CALC: 37.8 %
HGB BLD-MCNC: 12.9 G/DL
IMM GRANULOCYTES NFR BLD AUTO: 0.5 %
LYMPHOCYTES # BLD AUTO: 0.6 K/UL
LYMPHOCYTES NFR BLD AUTO: 16 %
MAGNESIUM SERPL-MCNC: 1.7 MG/DL
MAN DIFF?: NORMAL
MCHC RBC-ENTMCNC: 34.1 GM/DL
MCHC RBC-ENTMCNC: 41.3 PG
MCV RBC AUTO: 121.2 FL
MONOCYTES # BLD AUTO: 0.24 K/UL
MONOCYTES NFR BLD AUTO: 6.4 %
NEUTROPHILS # BLD AUTO: 2.88 K/UL
NEUTROPHILS NFR BLD AUTO: 76.8 %
PLATELET # BLD AUTO: 242 K/UL
POTASSIUM SERPL-SCNC: 3.9 MMOL/L
PROT SERPL-MCNC: 7.5 G/DL
RBC # BLD: 3.12 M/UL
RBC # FLD: 15.2 %
SODIUM SERPL-SCNC: 142 MMOL/L
WBC # FLD AUTO: 3.75 K/UL

## 2020-11-18 ENCOUNTER — NON-APPOINTMENT (OUTPATIENT)
Age: 65
End: 2020-11-18

## 2020-11-19 ENCOUNTER — APPOINTMENT (OUTPATIENT)
Dept: PHYSICAL MEDICINE AND REHAB | Facility: CLINIC | Age: 65
End: 2020-11-19
Payer: MEDICARE

## 2020-11-19 PROCEDURE — 62323 NJX INTERLAMINAR LMBR/SAC: CPT

## 2020-11-30 RX ORDER — PANTOPRAZOLE 40 MG/1
40 TABLET, DELAYED RELEASE ORAL
Qty: 30 | Refills: 2 | Status: ACTIVE | COMMUNITY
Start: 2020-08-12 | End: 1900-01-01

## 2021-01-07 ENCOUNTER — APPOINTMENT (OUTPATIENT)
Dept: CT IMAGING | Facility: HOSPITAL | Age: 66
End: 2021-01-07

## 2021-01-07 ENCOUNTER — OUTPATIENT (OUTPATIENT)
Dept: OUTPATIENT SERVICES | Facility: HOSPITAL | Age: 66
LOS: 1 days | End: 2021-01-07
Payer: MEDICARE

## 2021-01-07 DIAGNOSIS — Z98.89 OTHER SPECIFIED POSTPROCEDURAL STATES: Chronic | ICD-10-CM

## 2021-01-07 DIAGNOSIS — Z98.890 OTHER SPECIFIED POSTPROCEDURAL STATES: Chronic | ICD-10-CM

## 2021-01-07 DIAGNOSIS — Z41.9 ENCOUNTER FOR PROCEDURE FOR PURPOSES OTHER THAN REMEDYING HEALTH STATE, UNSPECIFIED: Chronic | ICD-10-CM

## 2021-01-07 PROCEDURE — 71250 CT THORAX DX C-: CPT

## 2021-01-07 PROCEDURE — 71250 CT THORAX DX C-: CPT | Mod: 26,MH

## 2021-02-03 RX ORDER — CICLOPIROX 80 MG/ML
8 SOLUTION TOPICAL
Qty: 1 | Refills: 1 | Status: ACTIVE | COMMUNITY
Start: 2020-11-12 | End: 1900-01-01

## 2021-03-30 ENCOUNTER — APPOINTMENT (OUTPATIENT)
Dept: PULMONOLOGY | Facility: CLINIC | Age: 66
End: 2021-03-30
Payer: MEDICARE

## 2021-03-30 VITALS
HEIGHT: 69 IN | OXYGEN SATURATION: 98 % | HEART RATE: 82 BPM | WEIGHT: 141 LBS | TEMPERATURE: 98.4 F | BODY MASS INDEX: 20.88 KG/M2

## 2021-03-30 DIAGNOSIS — G89.18 OTHER ACUTE POSTPROCEDURAL PAIN: ICD-10-CM

## 2021-03-30 DIAGNOSIS — C79.51 SECONDARY MALIGNANT NEOPLASM OF BONE: ICD-10-CM

## 2021-03-30 DIAGNOSIS — C34.91 MALIGNANT NEOPLASM OF UNSPECIFIED PART OF RIGHT BRONCHUS OR LUNG: ICD-10-CM

## 2021-03-30 PROCEDURE — 99214 OFFICE O/P EST MOD 30 MIN: CPT

## 2021-03-30 NOTE — ASSESSMENT
[FreeTextEntry1] : I reviewed the CT scan of the chest and all the other images with the patient and the son.  The patient had a CT scan in June and then a PET scan in September followed by a CT scan in January and another PET scan in March.  There is dramatic progression of the of the disease since March since January.  There is evidence of liver mass metastatic bone mets and mediastinal and supraclavicular adenopathy.  The picture could be consistent with metastatic breast or lung cancer.  I explained to them that the patient will need a CT scan biopsy and may be evaluation by new generation sequencing.  I I informed them that I will discussed the case with her oncologist.\par \par I contacted her oncologist at [facility] who has a copy of the CT scan and she agrees to proceed with the CT scan guided biopsy of the liver.\par \par She is to continue on the Eliquis and will discuss further

## 2021-03-30 NOTE — HISTORY OF PRESENT ILLNESS
[Never] : never [TextBox_4] : She is following on the CT scan of the chest.  She has an appointment with her oncologist.  She is getting the chemo

## 2021-04-01 ENCOUNTER — LABORATORY RESULT (OUTPATIENT)
Age: 66
End: 2021-04-01

## 2021-04-01 DIAGNOSIS — Z00.00 ENCOUNTER FOR GENERAL ADULT MEDICAL EXAMINATION W/OUT ABNORMAL FINDINGS: ICD-10-CM

## 2021-04-02 ENCOUNTER — APPOINTMENT (OUTPATIENT)
Dept: PULMONOLOGY | Facility: CLINIC | Age: 66
End: 2021-04-02
Payer: MEDICARE

## 2021-04-02 PROCEDURE — 36415 COLL VENOUS BLD VENIPUNCTURE: CPT

## 2021-04-06 LAB
ALBUMIN SERPL ELPH-MCNC: 3.7 G/DL
ALP BLD-CCNC: 130 U/L
ALT SERPL-CCNC: 18 U/L
ANION GAP SERPL CALC-SCNC: 22 MMOL/L
AST SERPL-CCNC: 136 U/L
BASOPHILS # BLD AUTO: 0.04 K/UL
BASOPHILS NFR BLD AUTO: 1 %
BILIRUB SERPL-MCNC: 0.5 MG/DL
BUN SERPL-MCNC: 8 MG/DL
CALCIUM SERPL-MCNC: 8.4 MG/DL
CHLORIDE SERPL-SCNC: 94 MMOL/L
CO2 SERPL-SCNC: 24 MMOL/L
CREAT SERPL-MCNC: 0.66 MG/DL
EOSINOPHIL # BLD AUTO: 0.03 K/UL
EOSINOPHIL NFR BLD AUTO: 0.8 %
GLUCOSE SERPL-MCNC: 87 MG/DL
HCT VFR BLD CALC: 36.9 %
HGB BLD-MCNC: 12.7 G/DL
IMM GRANULOCYTES NFR BLD AUTO: 0.5 %
LYMPHOCYTES # BLD AUTO: 1.2 K/UL
LYMPHOCYTES NFR BLD AUTO: 31.3 %
MAN DIFF?: NORMAL
MCHC RBC-ENTMCNC: 34.4 GM/DL
MCHC RBC-ENTMCNC: 40.2 PG
MCV RBC AUTO: 116.8 FL
MONOCYTES # BLD AUTO: 0.22 K/UL
MONOCYTES NFR BLD AUTO: 5.7 %
NEUTROPHILS # BLD AUTO: 2.32 K/UL
NEUTROPHILS NFR BLD AUTO: 60.7 %
PLATELET # BLD AUTO: 218 K/UL
POTASSIUM SERPL-SCNC: 3.8 MMOL/L
PROT SERPL-MCNC: 6.9 G/DL
RBC # BLD: 3.16 M/UL
RBC # FLD: 16.3 %
SODIUM SERPL-SCNC: 141 MMOL/L
WBC # FLD AUTO: 3.83 K/UL

## 2021-04-09 ENCOUNTER — LABORATORY RESULT (OUTPATIENT)
Age: 66
End: 2021-04-09

## 2021-04-12 ENCOUNTER — RESULT REVIEW (OUTPATIENT)
Age: 66
End: 2021-04-12

## 2021-04-12 ENCOUNTER — APPOINTMENT (OUTPATIENT)
Dept: INTERVENTIONAL RADIOLOGY/VASCULAR | Facility: HOSPITAL | Age: 66
End: 2021-04-12

## 2021-04-12 ENCOUNTER — OUTPATIENT (OUTPATIENT)
Dept: OUTPATIENT SERVICES | Facility: HOSPITAL | Age: 66
LOS: 1 days | End: 2021-04-12
Payer: MEDICARE

## 2021-04-12 DIAGNOSIS — Z98.89 OTHER SPECIFIED POSTPROCEDURAL STATES: Chronic | ICD-10-CM

## 2021-04-12 DIAGNOSIS — Z98.890 OTHER SPECIFIED POSTPROCEDURAL STATES: Chronic | ICD-10-CM

## 2021-04-12 DIAGNOSIS — Z41.9 ENCOUNTER FOR PROCEDURE FOR PURPOSES OTHER THAN REMEDYING HEALTH STATE, UNSPECIFIED: Chronic | ICD-10-CM

## 2021-04-12 PROCEDURE — 99152 MOD SED SAME PHYS/QHP 5/>YRS: CPT

## 2021-04-12 PROCEDURE — 88342 IMHCHEM/IMCYTCHM 1ST ANTB: CPT | Mod: XU

## 2021-04-12 PROCEDURE — 47000 NEEDLE BIOPSY OF LIVER PERQ: CPT

## 2021-04-12 PROCEDURE — 88341 IMHCHEM/IMCYTCHM EA ADD ANTB: CPT

## 2021-04-12 PROCEDURE — 76942 ECHO GUIDE FOR BIOPSY: CPT

## 2021-04-12 PROCEDURE — 88360 TUMOR IMMUNOHISTOCHEM/MANUAL: CPT | Mod: 26

## 2021-04-12 PROCEDURE — 88173 CYTOPATH EVAL FNA REPORT: CPT

## 2021-04-12 PROCEDURE — 76942 ECHO GUIDE FOR BIOPSY: CPT | Mod: 26

## 2021-04-12 PROCEDURE — 88360 TUMOR IMMUNOHISTOCHEM/MANUAL: CPT

## 2021-04-12 PROCEDURE — 88173 CYTOPATH EVAL FNA REPORT: CPT | Mod: 26

## 2021-04-12 PROCEDURE — 88342 IMHCHEM/IMCYTCHM 1ST ANTB: CPT | Mod: 26,59

## 2021-04-12 PROCEDURE — 88305 TISSUE EXAM BY PATHOLOGIST: CPT

## 2021-04-12 PROCEDURE — 99153 MOD SED SAME PHYS/QHP EA: CPT

## 2021-04-12 PROCEDURE — 88341 IMHCHEM/IMCYTCHM EA ADD ANTB: CPT | Mod: 26,59

## 2021-04-12 PROCEDURE — 88305 TISSUE EXAM BY PATHOLOGIST: CPT | Mod: 26

## 2021-04-13 LAB — NON-GYNECOLOGICAL CYTOLOGY STUDY: SIGNIFICANT CHANGE UP

## 2021-04-22 ENCOUNTER — APPOINTMENT (OUTPATIENT)
Dept: INTERNAL MEDICINE | Facility: CLINIC | Age: 66
End: 2021-04-22
Payer: MEDICARE

## 2021-04-22 ENCOUNTER — APPOINTMENT (OUTPATIENT)
Dept: PULMONOLOGY | Facility: CLINIC | Age: 66
End: 2021-04-22
Payer: MEDICARE

## 2021-04-22 VITALS
TEMPERATURE: 97.3 F | BODY MASS INDEX: 21.48 KG/M2 | HEART RATE: 92 BPM | DIASTOLIC BLOOD PRESSURE: 76 MMHG | SYSTOLIC BLOOD PRESSURE: 108 MMHG | OXYGEN SATURATION: 100 % | HEIGHT: 69 IN | WEIGHT: 145 LBS

## 2021-04-22 DIAGNOSIS — R91.8 OTHER NONSPECIFIC ABNORMAL FINDING OF LUNG FIELD: ICD-10-CM

## 2021-04-22 DIAGNOSIS — J44.9 CHRONIC OBSTRUCTIVE PULMONARY DISEASE, UNSPECIFIED: ICD-10-CM

## 2021-04-22 PROCEDURE — 99214 OFFICE O/P EST MOD 30 MIN: CPT

## 2021-04-22 PROCEDURE — 99214 OFFICE O/P EST MOD 30 MIN: CPT | Mod: 95

## 2021-04-22 NOTE — ASSESSMENT
[FreeTextEntry1] : I discussed the case in details with the patient and her son.  The biopsy was positive for metastatic ductal carcinoma to the liver.  Explained to them that the CT scan of finding with metastatic disease are most likely related to metastatic breast cancer.  I gave her a copy of the pathology reports of my note from previous visit.  I explained to them that needed to follow-up with her her oncologist.  I also explained to them that my seek another protocol for treatment of metastatic recurrent breast cancer.  I answered all the question.  The patient is to continue on the current regimen.I left a message to the oncology

## 2021-04-28 ENCOUNTER — EMERGENCY (EMERGENCY)
Facility: HOSPITAL | Age: 66
LOS: 1 days | Discharge: HOME | End: 2021-04-28
Attending: EMERGENCY MEDICINE | Admitting: EMERGENCY MEDICINE
Payer: MEDICARE

## 2021-04-28 VITALS
WEIGHT: 138.01 LBS | OXYGEN SATURATION: 96 % | HEART RATE: 105 BPM | DIASTOLIC BLOOD PRESSURE: 62 MMHG | HEIGHT: 68 IN | SYSTOLIC BLOOD PRESSURE: 89 MMHG | TEMPERATURE: 99 F | RESPIRATION RATE: 18 BRPM

## 2021-04-28 VITALS
DIASTOLIC BLOOD PRESSURE: 72 MMHG | HEART RATE: 77 BPM | OXYGEN SATURATION: 98 % | SYSTOLIC BLOOD PRESSURE: 121 MMHG | RESPIRATION RATE: 16 BRPM | TEMPERATURE: 98 F

## 2021-04-28 DIAGNOSIS — C50.919 MALIGNANT NEOPLASM OF UNSPECIFIED SITE OF UNSPECIFIED FEMALE BREAST: ICD-10-CM

## 2021-04-28 DIAGNOSIS — J44.9 CHRONIC OBSTRUCTIVE PULMONARY DISEASE, UNSPECIFIED: ICD-10-CM

## 2021-04-28 DIAGNOSIS — Z41.9 ENCOUNTER FOR PROCEDURE FOR PURPOSES OTHER THAN REMEDYING HEALTH STATE, UNSPECIFIED: Chronic | ICD-10-CM

## 2021-04-28 DIAGNOSIS — Z79.51 LONG TERM (CURRENT) USE OF INHALED STEROIDS: ICD-10-CM

## 2021-04-28 DIAGNOSIS — M79.18 MYALGIA, OTHER SITE: ICD-10-CM

## 2021-04-28 DIAGNOSIS — Z98.890 OTHER SPECIFIED POSTPROCEDURAL STATES: Chronic | ICD-10-CM

## 2021-04-28 DIAGNOSIS — Z98.89 OTHER SPECIFIED POSTPROCEDURAL STATES: Chronic | ICD-10-CM

## 2021-04-28 DIAGNOSIS — I10 ESSENTIAL (PRIMARY) HYPERTENSION: ICD-10-CM

## 2021-04-28 DIAGNOSIS — M25.551 PAIN IN RIGHT HIP: ICD-10-CM

## 2021-04-28 DIAGNOSIS — Z79.899 OTHER LONG TERM (CURRENT) DRUG THERAPY: ICD-10-CM

## 2021-04-28 DIAGNOSIS — R53.1 WEAKNESS: ICD-10-CM

## 2021-04-28 DIAGNOSIS — M25.511 PAIN IN RIGHT SHOULDER: ICD-10-CM
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
## 2021-04-28 DIAGNOSIS — C78.01 SECONDARY MALIGNANT NEOPLASM OF RIGHT LUNG: ICD-10-CM
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
## 2021-04-28 DIAGNOSIS — Z20.822 CONTACT WITH AND (SUSPECTED) EXPOSURE TO COVID-19: ICD-10-CM

## 2021-04-28 DIAGNOSIS — C79.51 SECONDARY MALIGNANT NEOPLASM OF BONE: ICD-10-CM

## 2021-04-28 DIAGNOSIS — C02.9 MALIGNANT NEOPLASM OF TONGUE, UNSPECIFIED: ICD-10-CM

## 2021-04-28 DIAGNOSIS — Z86.718 PERSONAL HISTORY OF OTHER VENOUS THROMBOSIS AND EMBOLISM: ICD-10-CM

## 2021-04-28 LAB
ALBUMIN SERPL ELPH-MCNC: 2.7 G/DL — LOW (ref 3.3–5)
ALP SERPL-CCNC: 334 U/L — HIGH (ref 40–120)
ALT FLD-CCNC: 49 U/L — HIGH (ref 10–45)
ANION GAP SERPL CALC-SCNC: 21 MMOL/L — HIGH (ref 5–17)
ANISOCYTOSIS BLD QL: SIGNIFICANT CHANGE UP
APPEARANCE UR: CLEAR — SIGNIFICANT CHANGE UP
APTT BLD: 38.4 SEC — HIGH (ref 27.5–35.5)
AST SERPL-CCNC: 493 U/L — HIGH (ref 10–40)
BASOPHILS # BLD AUTO: 0 K/UL — SIGNIFICANT CHANGE UP (ref 0–0.2)
BASOPHILS NFR BLD AUTO: 0 % — SIGNIFICANT CHANGE UP (ref 0–2)
BILIRUB SERPL-MCNC: 1.4 MG/DL — HIGH (ref 0.2–1.2)
BILIRUB UR-MCNC: ABNORMAL
BUN SERPL-MCNC: 15 MG/DL — SIGNIFICANT CHANGE UP (ref 7–23)
CALCIUM SERPL-MCNC: 9.1 MG/DL — SIGNIFICANT CHANGE UP (ref 8.4–10.5)
CHLORIDE SERPL-SCNC: 88 MMOL/L — LOW (ref 96–108)
CK MB CFR SERPL CALC: 4.1 NG/ML — SIGNIFICANT CHANGE UP (ref 0–6.7)
CK SERPL-CCNC: 1714 U/L — HIGH (ref 25–170)
CO2 SERPL-SCNC: 24 MMOL/L — SIGNIFICANT CHANGE UP (ref 22–31)
COLOR SPEC: YELLOW — SIGNIFICANT CHANGE UP
CREAT SERPL-MCNC: 0.95 MG/DL — SIGNIFICANT CHANGE UP (ref 0.5–1.3)
DIFF PNL FLD: NEGATIVE — SIGNIFICANT CHANGE UP
EOSINOPHIL # BLD AUTO: 0 K/UL — SIGNIFICANT CHANGE UP (ref 0–0.5)
EOSINOPHIL NFR BLD AUTO: 0 % — SIGNIFICANT CHANGE UP (ref 0–6)
GIANT PLATELETS BLD QL SMEAR: PRESENT — SIGNIFICANT CHANGE UP
GLUCOSE SERPL-MCNC: 95 MG/DL — SIGNIFICANT CHANGE UP (ref 70–99)
GLUCOSE UR QL: NEGATIVE — SIGNIFICANT CHANGE UP
HCT VFR BLD CALC: 35.1 % — SIGNIFICANT CHANGE UP (ref 34.5–45)
HGB BLD-MCNC: 12.6 G/DL — SIGNIFICANT CHANGE UP (ref 11.5–15.5)
INR BLD: 3.35 — HIGH (ref 0.88–1.16)
KETONES UR-MCNC: ABNORMAL MG/DL
LEUKOCYTE ESTERASE UR-ACNC: NEGATIVE — SIGNIFICANT CHANGE UP
LYMPHOCYTES # BLD AUTO: 0.72 K/UL — LOW (ref 1–3.3)
LYMPHOCYTES # BLD AUTO: 5.2 % — LOW (ref 13–44)
MACROCYTES BLD QL: SIGNIFICANT CHANGE UP
MANUAL SMEAR VERIFICATION: SIGNIFICANT CHANGE UP
MCHC RBC-ENTMCNC: 35.9 GM/DL — SIGNIFICANT CHANGE UP (ref 32–36)
MCHC RBC-ENTMCNC: 37.7 PG — HIGH (ref 27–34)
MCV RBC AUTO: 105.1 FL — HIGH (ref 80–100)
MONOCYTES # BLD AUTO: 2.15 K/UL — HIGH (ref 0–0.9)
MONOCYTES NFR BLD AUTO: 15.5 % — HIGH (ref 2–14)
NEUTROPHILS # BLD AUTO: 10.88 K/UL — HIGH (ref 1.8–7.4)
NEUTROPHILS NFR BLD AUTO: 76.7 % — SIGNIFICANT CHANGE UP (ref 43–77)
NEUTS BAND # BLD: 1.7 % — SIGNIFICANT CHANGE UP (ref 0–8)
NITRITE UR-MCNC: NEGATIVE — SIGNIFICANT CHANGE UP
NRBC # BLD: 2 /100 — HIGH (ref 0–0)
NRBC # BLD: SIGNIFICANT CHANGE UP /100 WBCS (ref 0–0)
PH UR: 5.5 — SIGNIFICANT CHANGE UP (ref 5–8)
PLAT MORPH BLD: ABNORMAL
PLATELET # BLD AUTO: 376 K/UL — SIGNIFICANT CHANGE UP (ref 150–400)
POIKILOCYTOSIS BLD QL AUTO: SLIGHT — SIGNIFICANT CHANGE UP
POLYCHROMASIA BLD QL SMEAR: SLIGHT — SIGNIFICANT CHANGE UP
POTASSIUM SERPL-MCNC: 3.4 MMOL/L — LOW (ref 3.5–5.3)
POTASSIUM SERPL-SCNC: 3.4 MMOL/L — LOW (ref 3.5–5.3)
PROT SERPL-MCNC: 7.3 G/DL — SIGNIFICANT CHANGE UP (ref 6–8.3)
PROT UR-MCNC: ABNORMAL MG/DL
PROTHROM AB SERPL-ACNC: 37.9 SEC — HIGH (ref 10.6–13.6)
RBC # BLD: 3.34 M/UL — LOW (ref 3.8–5.2)
RBC # FLD: 17.2 % — HIGH (ref 10.3–14.5)
RBC BLD AUTO: ABNORMAL
SARS-COV-2 RNA SPEC QL NAA+PROBE: SIGNIFICANT CHANGE UP
SODIUM SERPL-SCNC: 133 MMOL/L — LOW (ref 135–145)
SP GR SPEC: 1.02 — SIGNIFICANT CHANGE UP (ref 1–1.03)
STOMATOCYTES BLD QL SMEAR: SLIGHT — SIGNIFICANT CHANGE UP
TROPONIN T SERPL-MCNC: 0.01 NG/ML — SIGNIFICANT CHANGE UP (ref 0–0.01)
UROBILINOGEN FLD QL: 0.2 E.U./DL — SIGNIFICANT CHANGE UP
VARIANT LYMPHS # BLD: 0.9 % — SIGNIFICANT CHANGE UP (ref 0–6)
WBC # BLD: 13.88 K/UL — HIGH (ref 3.8–10.5)
WBC # FLD AUTO: 13.88 K/UL — HIGH (ref 3.8–10.5)

## 2021-04-28 PROCEDURE — 82550 ASSAY OF CK (CPK): CPT

## 2021-04-28 PROCEDURE — U0003: CPT

## 2021-04-28 PROCEDURE — 93005 ELECTROCARDIOGRAM TRACING: CPT

## 2021-04-28 PROCEDURE — 85610 PROTHROMBIN TIME: CPT

## 2021-04-28 PROCEDURE — 96361 HYDRATE IV INFUSION ADD-ON: CPT

## 2021-04-28 PROCEDURE — U0005: CPT

## 2021-04-28 PROCEDURE — 87086 URINE CULTURE/COLONY COUNT: CPT

## 2021-04-28 PROCEDURE — 36415 COLL VENOUS BLD VENIPUNCTURE: CPT

## 2021-04-28 PROCEDURE — 96360 HYDRATION IV INFUSION INIT: CPT

## 2021-04-28 PROCEDURE — 85730 THROMBOPLASTIN TIME PARTIAL: CPT

## 2021-04-28 PROCEDURE — 71045 X-RAY EXAM CHEST 1 VIEW: CPT | Mod: 26

## 2021-04-28 PROCEDURE — 85025 COMPLETE CBC W/AUTO DIFF WBC: CPT

## 2021-04-28 PROCEDURE — 82553 CREATINE MB FRACTION: CPT

## 2021-04-28 PROCEDURE — 80053 COMPREHEN METABOLIC PANEL: CPT

## 2021-04-28 PROCEDURE — 73502 X-RAY EXAM HIP UNI 2-3 VIEWS: CPT

## 2021-04-28 PROCEDURE — 70450 CT HEAD/BRAIN W/O DYE: CPT | Mod: 26,MA

## 2021-04-28 PROCEDURE — 81001 URINALYSIS AUTO W/SCOPE: CPT

## 2021-04-28 PROCEDURE — 73060 X-RAY EXAM OF HUMERUS: CPT | Mod: 26,RT

## 2021-04-28 PROCEDURE — 82962 GLUCOSE BLOOD TEST: CPT

## 2021-04-28 PROCEDURE — 99285 EMERGENCY DEPT VISIT HI MDM: CPT | Mod: CS,25

## 2021-04-28 PROCEDURE — 99284 EMERGENCY DEPT VISIT MOD MDM: CPT | Mod: 25

## 2021-04-28 PROCEDURE — 73502 X-RAY EXAM HIP UNI 2-3 VIEWS: CPT | Mod: 26,RT

## 2021-04-28 PROCEDURE — 70450 CT HEAD/BRAIN W/O DYE: CPT

## 2021-04-28 PROCEDURE — 93010 ELECTROCARDIOGRAM REPORT: CPT

## 2021-04-28 PROCEDURE — 73552 X-RAY EXAM OF FEMUR 2/>: CPT | Mod: 26,RT

## 2021-04-28 PROCEDURE — 73030 X-RAY EXAM OF SHOULDER: CPT

## 2021-04-28 PROCEDURE — 73030 X-RAY EXAM OF SHOULDER: CPT | Mod: 26,RT

## 2021-04-28 PROCEDURE — 84484 ASSAY OF TROPONIN QUANT: CPT

## 2021-04-28 PROCEDURE — 73552 X-RAY EXAM OF FEMUR 2/>: CPT

## 2021-04-28 PROCEDURE — 73060 X-RAY EXAM OF HUMERUS: CPT

## 2021-04-28 PROCEDURE — 71045 X-RAY EXAM CHEST 1 VIEW: CPT

## 2021-04-28 RX ORDER — SODIUM CHLORIDE 9 MG/ML
1000 INJECTION INTRAMUSCULAR; INTRAVENOUS; SUBCUTANEOUS ONCE
Refills: 0 | Status: COMPLETED | OUTPATIENT
Start: 2021-04-28 | End: 2021-04-28

## 2021-04-28 RX ORDER — ACETAMINOPHEN 500 MG
1000 TABLET ORAL ONCE
Refills: 0 | Status: COMPLETED | OUTPATIENT
Start: 2021-04-28 | End: 2021-04-28

## 2021-04-28 RX ADMIN — SODIUM CHLORIDE 1000 MILLILITER(S): 9 INJECTION INTRAMUSCULAR; INTRAVENOUS; SUBCUTANEOUS at 19:10

## 2021-04-28 RX ADMIN — SODIUM CHLORIDE 1000 MILLILITER(S): 9 INJECTION INTRAMUSCULAR; INTRAVENOUS; SUBCUTANEOUS at 20:36

## 2021-04-28 RX ADMIN — Medication 1000 MILLIGRAM(S): at 21:23

## 2021-04-28 RX ADMIN — Medication 1000 MILLIGRAM(S): at 21:53

## 2021-04-28 NOTE — ED PROVIDER NOTE - CARE PLAN
Principal Discharge DX:	Breast cancer  Secondary Diagnosis:	Fall  Secondary Diagnosis:	Musculoskeletal pain

## 2021-04-28 NOTE — ED ADULT NURSE NOTE - NSIMPLEMENTINTERV_GEN_ALL_ED
Implemented All Fall Risk Interventions:  [place] to call system. Call bell, personal items and telephone within reach. Instruct patient to call for assistance. Room bathroom lighting operational. Non-slip footwear when patient is off stretcher. Physically safe environment: no spills, clutter or unnecessary equipment. Stretcher in lowest position, wheels locked, appropriate side rails in place. Provide visual cue, wrist band, yellow gown, etc. Monitor gait and stability. Monitor for mental status changes and reorient to person, place, and time. Review medications for side effects contributing to fall risk. Reinforce activity limits and safety measures with patient and family.

## 2021-04-28 NOTE — ED ADULT TRIAGE NOTE - HEIGHT IN INCHES
[employer] Activation    Thank you for requesting access to [employer]. Please follow the instructions below to securely access and download your online medical record. [employer] allows you to send messages to your doctor, view your test results, renew your prescriptions, schedule appointments, and more. How Do I Sign Up? 1. In your internet browser, go to [email or web address]  2. Click on the First Time User? Click Here link in the Sign In box. You will be redirect to the New Member Sign Up page. 3. Enter your [employer] Access Code exactly as it appears below. You will not need to use this code after youve completed the sign-up process. If you do not sign up before the expiration date, you must request a new code. [employer] Access Code: [email or web address]  Expires: 2019 10:07 AM (This is the date your [employer] access code will )    4. Enter the last four digits of your Social Security Number (xxxx) and Date of Birth (mm/dd/yyyy) as indicated and click Submit. You will be taken to the next sign-up page. 5. Create a [employer] ID. This will be your [employer] login ID and cannot be changed, so think of one that is secure and easy to remember. 6. Create a [employer] password. You can change your password at any time. 7. Enter your Password Reset Question and Answer. This can be used at a later time if you forget your password. 8. Enter your e-mail address. You will receive e-mail notification when new information is available in [address]. 9. Click Sign Up. You can now view and download portions of your medical record. 10. Click the Download Summary menu link to download a portable copy of your medical information. Additional Information    If you have questions, please visit the Frequently Asked Questions section of the [employer] website at [email or web address] Remember, [employer] is NOT to be used for urgent needs. For medical emergencies, dial 911. 8

## 2021-04-28 NOTE — ED PROVIDER NOTE - CLINICAL SUMMARY MEDICAL DECISION MAKING FREE TEXT BOX
66F PMHx of COPD, asthma, HTN, RUL lung ca in remission, recurrent breast cancer via sacral bone met on ibrance/fulvestrant,  LLE DVT 2019, squamous cell carcinoma of the tongue in remission, recently visit with MD shows worsening spread of breat ca (pt is working on setting up treatment) who p/w fall.   Pt was using her walker and went to sit on the edge of the bed and slipped in the bedcover falling on to her right side at 3am last night. She was not found until 11 hours later at 2pm and was unable to get up or eat/drink anything during that time. She c/o generalized weakness and pain to her right shoulder and right hip, denies head trauma or loc, no headache, no neck pain, no n/v, no cp/sob, no abdominal pain. 66F PMHx of COPD, asthma, HTN, RUL lung ca in remission, recurrent breast cancer via sacral bone met on ibrance/fulvestrant,  LLE DVT 2019, squamous cell carcinoma of the tongue in remission, recently visit with MD shows worsening spread of breat ca (pt is working on setting up treatment) who p/w fall.   Pt was using her walker and went to sit on the edge of the bed and slipped in the bedcover falling on to her right side at 3am last night. She was not found until 11 hours later at 2pm and was unable to get up or eat/drink anything during that time. She c/o generalized weakness and pain to her right shoulder and right hip, denies head trauma or loc, no headache, no neck pain, no n/v, no cp/sob, no abdominal pain.  Pt chronically ill appearing on exam, stable VS, generally weak without focal neuro deficit, TTP right upper arm and right hip without deformity. Plan for labs, X-rays.   Labs noted, elevated LFTs likely due to known liver mets, X-rays negative for fracture, pt was hydrated and given Tylenol and feeling better. Offered admission for generalized weakness and metastatic cancer but pt and family declined, pt will be staying with her brother and CHARLIE who will look after her and she does not want to be hospitalized. She plans to f/u as outpt for continuation of her cancer care. Stable for DC.

## 2021-04-28 NOTE — ED ADULT NURSE NOTE - NSSEPSISCRITERIAMET_ED_A_ED
Patient was seen, and examined with resident.  History and physical discussed with Dr. Jiang and agree with assessment and plan.  HCC. R lateral epicondylitis. HA's.  Please refer to resident note for details. Hepatology referral. Thx'ic steroid injection R lateral epicondyle. I was physically present for the key portion of the procedure.       Abnormal VS & WBC

## 2021-04-28 NOTE — ED PROVIDER NOTE - PHYSICAL EXAMINATION
GEN: Well developed, thin, awake, alert, oriented to person, place, time/situation and in no apparent distress. NTAF  ENT: Airway patent, Nasal mucosa clear. Mouth with normal mucosa.  EYES: Clear bilaterally. PERRL, EOMI  RESPIRATORY: Breathing comfortably with normal RR. No W/C/R, no hypoxia or resp distress.  CARDIAC: Regular rate and rhythm, no M/R/G  ABDOMEN: Soft, nontender, +bowel sounds, no rebound, rigidity, or guarding.  MSK: Pain with palpated of right shoulder and papulation of lateral hip however Range of motion is not limited, no deformities noted, no STS.   NEURO: Alert and oriented x 3, pt generally weak > in lower extremities, sensation intact.   SKIN: Skin normal color for race, warm, dry and intact. bruising to right upper arm. No evidence of rash.  PSYCH: Alert and oriented to person, place, time/situation. normal mood and affect. no apparent risk to self or others.

## 2021-04-28 NOTE — ED ADULT TRIAGE NOTE - CHIEF COMPLAINT QUOTE
Pt presents s/p fall of unknown  cause at 3am today. States she walked from bathroom to bed and her legs fell out from underneath her. Pt denies LOC, was on the ground until her family came at 2pm, ~ 11 hrs in total down time. Pt denies lightheadedness/dizziness at this time. PT reports pain to right shoulder, right knee, lower back. Ambulating w/ rollator on arrival.

## 2021-04-28 NOTE — ED PROVIDER NOTE - NSFOLLOWUPINSTRUCTIONS_ED_ALL_ED_FT
Please follow up with your primary care physician and oncologist. If you have any problem getting an appointment this week, please call the ED Referral Coordinator at [phone].  Return to the Emergency Department if you have any new or worsening symptoms, or for any other concerns. Please read below for further information.    Fall Prevention in the Home, Adult    Falls can cause injuries. They can happen to people of all ages. There are many things you can do to make your home safe and to help prevent falls. Ask for help when making these changes, if needed.  What actions can I take to prevent falls?    General Instructions     Use good lighting in all rooms. Replace any light bulbs that burn out.   Turn on the lights when you go into a dark area. Use night-lights.  Keep items that you use often in easy-to-reach places. Lower the shelves around your home if necessary.  Set up your furniture so you have a clear path. Avoid moving your furniture around.  Do not have throw rugs and other things on the floor that can make you trip.  Avoid walking on wet floors.  If any of your floors are uneven, fix them.  Add color or contrast paint or tape to clearly carole and help you see:  Any grab bars or handrails.  First and last steps of stairways.  Where the edge of each step is.  If you use a stepladder:  Make sure that it is fully opened. Do not climb a closed stepladder.  Make sure that both sides of the stepladder are locked into place.  Ask someone to hold the stepladder for you while you use it.  If there are any pets around you, be aware of where they are.  What can I do in the bathroom?         Keep the floor dry. Clean up any water that spills onto the floor as soon as it happens.  Remove soap buildup in the tub or shower regularly.  Use non-skid mats or decals on the floor of the tub or shower.  Attach bath mats securely with double-sided, non-slip rug tape.  If you need to sit down in the shower, use a plastic, non-slip stool.  Install grab bars by the toilet and in the tub and shower. Do not use towel bars as grab bars.  What can I do in the bedroom?     Make sure that you have a light by your bed that is easy to reach.  Do not use any sheets or blankets that are too big for your bed. They should not hang down onto the floor.  Have a firm chair that has side arms. You can use this for support while you get dressed.  What can I do in the kitchen?     Clean up any spills right away.  If you need to reach something above you, use a strong step stool that has a grab bar.  Keep electrical cords out of the way.  Do not use floor polish or wax that makes floors slippery. If you must use wax, use non-skid floor wax.  What can I do with my stairs?     Do not leave any items on the stairs.  Make sure that you have a light switch at the top of the stairs and the bottom of the stairs. If you do not have them, ask someone to add them for you.   Make sure that there are handrails on both sides of the stairs, and use them. Fix handrails that are broken or loose. Make sure that handrails are as long as the stairways.  Install non-slip stair treads on all stairs in your home.  Avoid having throw rugs at the top or bottom of the stairs. If you do have throw rugs, attach them to the floor with carpet tape.  Choose a carpet that does not hide the edge of the steps on the stairway.  Check any carpeting to make sure that it is firmly attached to the stairs. Fix any carpet that is loose or worn.  What can I do on the outside of my home?     Use bright outdoor lighting.  Regularly fix the edges of walkways and driveways and fix any cracks.  Remove anything that might make you trip as you walk through a door, such as a raised step or threshold.  Trim any bushes or trees on the path to your home.  Regularly check to see if handrails are loose or broken. Make sure that both sides of any steps have handrails.  Install guardrails along the edges of any raised decks and porches.  Clear walking paths of anything that might make someone trip, such as tools or rocks.  Have any leaves, snow, or ice cleared regularly.  Use sand or salt on walking paths during winter.  Clean up any spills in your garage right away. This includes grease or oil spills.  What other actions can I take?     Wear shoes that:  Have a low heel. Do not wear high heels.  Have rubber bottoms.  Are comfortable and fit you well.  Are closed at the toe. Do not wear open-toe sandals.  Use tools that help you move around (mobility aids) if they are needed. These include:  Canes.  Walkers.  Scooters.  Crutches.  Review your medicines with your doctor. Some medicines can make you feel dizzy. This can increase your chance of falling.  Ask your doctor what other things you can do to help prevent falls.  Where to find more information  Centers for Disease Control and Prevention, ANDRES: https://cdc.govNatCarolinas ContinueC[facility] on Aging: https://np0tmfr.kevin.nih.govContact a doctor if:  You are afraid of falling at home.  You feel weak, drowsy, or dizzy at home.  You fall at home.  Summary  There are many simple things that you can do to make your home safe and to help prevent falls.  Ways to make your home safe include removing tripping hazards and installing grab bars in the bathroom.  Ask for help when making these changes in your home.  This information is not intended to replace advice given to you by your health care provider. Make sure you discuss any questions you have with your health care provider.    Dehydration    Dehydration is when there is not enough fluid or water in your body. This happens when you lose more fluids than you take in. People who are age 65 or older have a higher risk of getting dehydrated.    Dehydration can range from mild to very bad. It should be treated right away to keep it from getting very bad.     Symptoms of mild dehydration may include:    Thirst.  Dry lips.  Slightly dry mouth.  Dry, warm skin.  Dizziness.    Symptoms of moderate dehydration may include:    Very dry mouth.  Muscle cramps.  Dark pee (urine). Pee may be the color of tea.  Your body making less pee.  Your eyes making fewer tears.  Heartbeat that is uneven or faster than normal (palpitations).  Headache.  Light-headedness, especially when you stand up from sitting.  Fainting (syncope).    Symptoms of very bad dehydration may include:    Changes in skin, such as:  Cold and clammy skin.  Blotchy (mottled) or pale skin.  Skin that does not quickly return to normal after being lightly pinched and let go (poor skin turgor).  Changes in body fluids, such as:  Feeling very thirsty.  Your eyes making fewer tears.  Not sweating when body temperature is high, such as in hot weather.  Your body making very little pee.  Changes in vital signs, such as:  Weak pulse.  Pulse that is more than 100 beats a minute when you are sitting still.  Fast breathing.  Low blood pressure.  Other changes, such as:  Sunken eyes.  Cold hands and feet.  Confusion.  Lack of energy (lethargy).  Trouble waking up from sleep.  Short-term weight loss.  Unconsciousness.    Follow these instructions at home:  If told by your doctor, drink an ORS:  Make an ORS by using instructions on the package.  Start by drinking small amounts, about ½ cup (120 mL) every 5–10 minutes.  Slowly drink more until you have had the amount that your doctor said to have.  Drink enough clear fluid to keep your pee clear or pale yellow. If you were told to drink an ORS, finish the ORS first, then start slowly drinking clear fluids. Drink fluids such as:  Water. Do not drink only water by itself. Doing that can make the salt (sodium) level in your body get too low (hyponatremia).  Ice chips.  Fruit juice that you have added water to (diluted).  Low-calorie sports drinks.  Avoid:  Alcohol.  Drinks that have a lot of sugar. These include high-calorie sports drinks, fruit juice that does not have water added, and soda.  Caffeine.  Foods that are greasy or have a lot of fat or sugar.  Take over-the-counter and prescription medicines only as told by your doctor.  Do not take salt tablets. Doing that can make the salt level in your body get too high (hypernatremia).  Eat foods that have minerals (electrolytes). Examples include bananas, oranges, potatoes, tomatoes, and spinach.  Keep all follow-up visits as told by your doctor. This is important.     Contact a doctor if:  You have belly (abdominal) pain that:  Gets worse.  Stays in one area (localizes).  You have a rash.  You have a stiff neck.  You get angry or annoyed more easily than normal (irritability).  You are more sleepy than normal.  You have a harder time waking up than normal.  You feel:  Weak.  Dizzy.  Very thirsty.    Get help right away if:  You have symptoms of very bad dehydration.  You cannot drink fluids without throwing up (vomiting).  Your symptoms get worse with treatment.  You have a fever.  You have a very bad headache.  You are throwing up or having watery poop (diarrhea) and it:  Gets worse.  Does not go away.  You have diarrhea for more than 24 hours.  You have blood or something green (bile) in your throw-up.  You have blood in your poop (stool). This may cause poop to look black and tarry.  You have not peed in 6–8 hours.  You have peed (urinated) only a small amount of very dark pee during 6–8 hours.  You pass out (faint).  Your heart rate when you are sitting still is more than 100 beats a minute.  You have trouble breathing.    ADDITIONAL NOTES AND INSTRUCTIONS    Please follow up with your Primary MD in 24-48 hr.  Seek immediate medical care for any new/worsening signs or symptoms.

## 2021-04-28 NOTE — ED PROVIDER NOTE - OBJECTIVE STATEMENT
66F PMHx of COPD, asthma, RUL lobectomy in December 2016 for a lung nodule, and breast cancer in 2014, who p/w 66F PMHx of COPD, asthma, HTN, RUL lung ca in remission, recurrent breast cancer via sacral bone met on ibrance/fulvestrant,  LLE DVT 2019, squamous cell carcinoma of the tongue in remission, recently visit with MD shows worsening spread of breat ca (pt is working on setting up treatment) who p/w fall.   Pt went to sit on the edge of the bed and slipped in the bedcover falling on to her right side at 3am last night. She was not found until 11 hours later at 2pm and was unable to get up during that time. 66F PMHx of COPD, asthma, HTN, RUL lung ca in remission, recurrent breast cancer via sacral bone met on ibrance/fulvestrant,  LLE DVT 2019, squamous cell carcinoma of the tongue in remission, recently visit with MD shows worsening spread of breat ca (pt is working on setting up treatment) who p/w fall.   Pt was using her walker and went to sit on the edge of the bed and slipped in the bedcover falling on to her right side at 3am last night. She was not found until 11 hours later at 2pm and was unable to get up or eat/drink anything during that time. She c/o generalized weakness and pain to her right shoulder and right hip, denies head trauma or loc, no headache, no neck pain, no n/v, no cp/sob, no abdominal pain.

## 2021-04-28 NOTE — ED ADULT NURSE NOTE - OBJECTIVE STATEMENT
Patient presents to the ED complaining of a fall at 3AM today when she was trying to get into bed. Patient reports that she is unsure of what happened. Denies LOC or head injury. Patient reports that she was unable to get up after and was laying on the floor til 2PM until her sister in law came. Reports pain to the right shoulder, right hip and right knee. Patient with hx of breast cancer with now metastatic breast cancer. Patient reports that she is on eliquis.

## 2021-04-28 NOTE — ED ADULT NURSE REASSESSMENT NOTE - NS ED NURSE REASSESS COMMENT FT1
Received patient in stretcher. AOX4. Neurologically intact however noted L eye drooping. Patient reports PMD noted R eye blinking a lot lately compared to left, RN unsure if patient aware of L eye droop. Neurologically intact. Good equal strength in BL upper and lower extremity against resistance, normal  equal sensation in BL upper and lower extremity, face and cheek bilaterally when touched with finger, facial expressions are equal and symmetrical bilaterally when asked to smile, frown and puff out cheeks. MD Alvarez made aware of RN concerns to see patient at bedside. Patient denies chest pain, pain, discomfort, shortness of breath, difficulty breathing and any form of distress not noted. Patient oriented to ED area. Plan of care discussed and verbalized understanding. All needs attended. Pt on cardiac monitor. Hourly rounding in progress. Fall risk precautions maintained.

## 2021-04-29 ENCOUNTER — TRANSCRIPTION ENCOUNTER (OUTPATIENT)
Age: 66
End: 2021-04-29

## 2021-04-30 ENCOUNTER — TRANSCRIPTION ENCOUNTER (OUTPATIENT)
Age: 66
End: 2021-04-30

## 2021-04-30 LAB
CULTURE RESULTS: NO GROWTH — SIGNIFICANT CHANGE UP
SPECIMEN SOURCE: SIGNIFICANT CHANGE UP

## 2021-05-04 ENCOUNTER — APPOINTMENT (OUTPATIENT)
Dept: INTERNAL MEDICINE | Facility: CLINIC | Age: 66
End: 2021-05-04

## 2021-05-10 ENCOUNTER — NON-APPOINTMENT (OUTPATIENT)
Age: 66
End: 2021-05-10

## 2021-05-14 ENCOUNTER — APPOINTMENT (OUTPATIENT)
Dept: GASTROENTEROLOGY | Facility: CLINIC | Age: 66
End: 2021-05-14
Payer: MEDICARE

## 2021-05-14 VITALS
BODY MASS INDEX: 20.44 KG/M2 | DIASTOLIC BLOOD PRESSURE: 55 MMHG | OXYGEN SATURATION: 98 % | HEART RATE: 62 BPM | WEIGHT: 138 LBS | RESPIRATION RATE: 15 BRPM | TEMPERATURE: 96.4 F | HEIGHT: 69 IN | SYSTOLIC BLOOD PRESSURE: 100 MMHG

## 2021-05-14 DIAGNOSIS — R15.9 FULL INCONTINENCE OF FECES: ICD-10-CM

## 2021-05-14 PROCEDURE — 99215 OFFICE O/P EST HI 40 MIN: CPT

## 2021-05-16 ENCOUNTER — TRANSCRIPTION ENCOUNTER (OUTPATIENT)
Age: 66
End: 2021-05-16

## 2021-05-16 NOTE — HISTORY OF PRESENT ILLNESS
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\par here for follow up \par \par She how has breast cancer recurrence with mets to spine, bone, liver \par She is receiving chemo- 2 weeks on and one week off\par \par Here to discuss fecal incontience \par \par April 28-fell when trying to get into bed, lost balance at 3 am , on the floor until family saw her until 2pm\par Went to ER -told she had a stroke- was admitted at [facility]\par \par Fecal Incontinence - new over the past 6 months\par has no urge, and just goes in her pamper\par Diarrhea 3 x/day, no blood\par \par \par cscope in 2018- 20 cm mucosal nodule in transverse colon, PET CT shows no colonic lesion\par \par Amoxicillin prescribed recently for tongue lesion\par No fevers/chills/abd pain\par \par Soc: quit smoking, drinking recently\par Meds: PPO, eliquis, gabapentin, metoprolol, potassium, treyley, b12 complex, vitamin D\par FH: mother: breast cancer and lung cancer, father: lung cancer\par \par

## 2021-05-16 NOTE — ASSESSMENT
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

## 2021-05-16 NOTE — PHYSICAL EXAM
[Sclera] : the sclera and conjunctiva were normal [Outer Ear] : the ears and nose were normal in appearance [Neck Appearance] : the appearance of the neck was normal [Abdomen Soft] : soft [Abdomen Tenderness] : non-tender [] : no rash [Affect] : the affect was normal [FreeTextEntry1] : thin, frail

## 2021-05-17 ENCOUNTER — TRANSCRIPTION ENCOUNTER (OUTPATIENT)
Age: 66
End: 2021-05-17

## 2021-05-24 ENCOUNTER — TRANSCRIPTION ENCOUNTER (OUTPATIENT)
Age: 66
End: 2021-05-24

## 2021-05-25 ENCOUNTER — TRANSCRIPTION ENCOUNTER (OUTPATIENT)
Age: 66
End: 2021-05-25

## 2021-05-26 ENCOUNTER — NON-APPOINTMENT (OUTPATIENT)
Age: 66
End: 2021-05-26

## 2021-05-26 ENCOUNTER — APPOINTMENT (OUTPATIENT)
Dept: ULTRASOUND IMAGING | Facility: HOSPITAL | Age: 66
End: 2021-05-26

## 2021-05-26 ENCOUNTER — OUTPATIENT (OUTPATIENT)
Dept: OUTPATIENT SERVICES | Facility: HOSPITAL | Age: 66
LOS: 1 days | End: 2021-05-26
Payer: MEDICARE

## 2021-05-26 ENCOUNTER — APPOINTMENT (OUTPATIENT)
Dept: INTERNAL MEDICINE | Facility: CLINIC | Age: 66
End: 2021-05-26
Payer: MEDICARE

## 2021-05-26 VITALS
OXYGEN SATURATION: 100 % | HEIGHT: 69 IN | WEIGHT: 165 LBS | HEART RATE: 73 BPM | SYSTOLIC BLOOD PRESSURE: 94 MMHG | BODY MASS INDEX: 24.44 KG/M2 | TEMPERATURE: 97.2 F | DIASTOLIC BLOOD PRESSURE: 61 MMHG

## 2021-05-26 DIAGNOSIS — C50.919 MALIGNANT NEOPLASM OF UNSPECIFIED SITE OF UNSPECIFIED FEMALE BREAST: ICD-10-CM

## 2021-05-26 DIAGNOSIS — F10.20 ALCOHOL DEPENDENCE, UNCOMPLICATED: ICD-10-CM

## 2021-05-26 DIAGNOSIS — Z98.890 OTHER SPECIFIED POSTPROCEDURAL STATES: Chronic | ICD-10-CM

## 2021-05-26 DIAGNOSIS — R19.7 DIARRHEA, UNSPECIFIED: ICD-10-CM

## 2021-05-26 DIAGNOSIS — Z98.89 OTHER SPECIFIED POSTPROCEDURAL STATES: Chronic | ICD-10-CM

## 2021-05-26 DIAGNOSIS — R60.0 LOCALIZED EDEMA: ICD-10-CM

## 2021-05-26 DIAGNOSIS — I82.409 ACUTE EMBOLISM AND THROMBOSIS OF UNSPECIFIED DEEP VEINS OF UNSPECIFIED LOWER EXTREMITY: ICD-10-CM

## 2021-05-26 DIAGNOSIS — E87.6 HYPOKALEMIA: ICD-10-CM

## 2021-05-26 DIAGNOSIS — E83.42 HYPOMAGNESEMIA: ICD-10-CM

## 2021-05-26 DIAGNOSIS — I10 ESSENTIAL (PRIMARY) HYPERTENSION: ICD-10-CM

## 2021-05-26 DIAGNOSIS — Z41.9 ENCOUNTER FOR PROCEDURE FOR PURPOSES OTHER THAN REMEDYING HEALTH STATE, UNSPECIFIED: Chronic | ICD-10-CM

## 2021-05-26 PROCEDURE — 93970 EXTREMITY STUDY: CPT

## 2021-05-26 PROCEDURE — 93970 EXTREMITY STUDY: CPT | Mod: 26

## 2021-05-26 PROCEDURE — 99215 OFFICE O/P EST HI 40 MIN: CPT

## 2021-05-26 RX ORDER — FUROSEMIDE 20 MG/1
20 TABLET ORAL
Qty: 30 | Refills: 0 | Status: ACTIVE | COMMUNITY
Start: 2021-05-26 | End: 1900-01-01

## 2021-05-26 RX ORDER — ENOXAPARIN SODIUM 80 MG/.8ML
80 INJECTION SUBCUTANEOUS
Refills: 0 | Status: ACTIVE | COMMUNITY
Start: 2021-05-26

## 2021-05-26 RX ORDER — APIXABAN 5 MG/1
5 TABLET, FILM COATED ORAL
Qty: 30 | Refills: 0 | Status: DISCONTINUED | COMMUNITY
Start: 2019-06-17 | End: 2021-05-26

## 2021-06-01 ENCOUNTER — TRANSCRIPTION ENCOUNTER (OUTPATIENT)
Age: 66
End: 2021-06-01

## 2021-06-01 LAB
C DIFF TOX GENS STL QL NAA+PROBE: NORMAL
CDIFF BY PCR: NOT DETECTED

## 2021-06-02 ENCOUNTER — TRANSCRIPTION ENCOUNTER (OUTPATIENT)
Age: 66
End: 2021-06-02

## 2021-06-05 ENCOUNTER — NON-APPOINTMENT (OUTPATIENT)
Age: 66
End: 2021-06-05

## 2021-06-06 ENCOUNTER — TRANSCRIPTION ENCOUNTER (OUTPATIENT)
Age: 66
End: 2021-06-06

## 2021-06-11 ENCOUNTER — TRANSCRIPTION ENCOUNTER (OUTPATIENT)
Age: 66
End: 2021-06-11

## 2021-06-13 ENCOUNTER — TRANSCRIPTION ENCOUNTER (OUTPATIENT)
Age: 66
End: 2021-06-13

## 2021-06-16 ENCOUNTER — APPOINTMENT (OUTPATIENT)
Dept: INTERNAL MEDICINE | Facility: CLINIC | Age: 66
End: 2021-06-16

## 2021-06-23 ENCOUNTER — APPOINTMENT (OUTPATIENT)
Dept: PHYSICAL MEDICINE AND REHAB | Facility: CLINIC | Age: 66
End: 2021-06-23

## 2021-07-01 ENCOUNTER — APPOINTMENT (OUTPATIENT)
Dept: PHYSICAL MEDICINE AND REHAB | Facility: CLINIC | Age: 66
End: 2021-07-01

## 2021-07-07 ENCOUNTER — APPOINTMENT (OUTPATIENT)
Dept: PHYSICAL MEDICINE AND REHAB | Facility: CLINIC | Age: 66
End: 2021-07-07
Payer: MEDICARE

## 2021-07-07 VITALS — BODY MASS INDEX: 19.99 KG/M2 | HEIGHT: 69 IN | WEIGHT: 135 LBS

## 2021-07-07 DIAGNOSIS — M17.11 UNILATERAL PRIMARY OSTEOARTHRITIS, RIGHT KNEE: ICD-10-CM

## 2021-07-07 DIAGNOSIS — M17.12 UNILATERAL PRIMARY OSTEOARTHRITIS, LEFT KNEE: ICD-10-CM

## 2021-07-07 PROCEDURE — 99214 OFFICE O/P EST MOD 30 MIN: CPT | Mod: 25

## 2021-07-07 PROCEDURE — 20610 DRAIN/INJ JOINT/BURSA W/O US: CPT | Mod: 50

## 2021-07-07 NOTE — HISTORY OF PRESENT ILLNESS
[FreeTextEntry1] : Ms. YAMILA WILLAMS is a very pleasant 63 year female who comes in for follow up evaluation of the lower back/bilateral legs and bilateral knees. She has recently been admitted to a Rehab facility due to falls and a week stay in the hospital. Pain remains located in the low back radiating to the legs as well as the left>right knees intermittent in nature and described as dull ache. The pain is rated as 6/10 during today's visit, and ranges from 4-8/10. The patient's symptoms are aggravated mostly upon waking in the morning with stiffness/aching and alleviated by sitting/bending Advil and Tylenol. The patient feels weakness in both her legs, especially with walking, however denies any night pain, numbness/tingling, or bowel/bladder dysfunction. The patient has no other complaints at this time.

## 2021-07-07 NOTE — PHYSICAL EXAM
[FreeTextEntry1] : GEN: AAOx3, NAD.\par PSYCH: Normal mood and affect. Responds appropriately to commands.\par EYES: Sclerae Anicteric. No discharge. EOMI.\par RESP: Breathing unlabored.\par CV: DP pulses 2+ and equal. No varicosities noted.\par EXT: No C/C/E.\par SKIN: No lesions noted.\par STRENGTH: 5/5 bilateral hip flexors, knee extensors, knee flexors, ankle dorsiflexors, long toe extensors, ankle plantar flexors, hip extensors, hip abductors.\par TONE: Normal, No clonus.\par REFLEXES: 1+ symmetric patella, absent bilateral achilles. Plantars downgoing bilaterally.\par SENS: Grossly intact to light touch bilateral lower extremities.\par INSP: Spine alignment is midline, with no evidence of scoliosis.\par STANCE: No Trendelenburg with single leg stance.\par GAIT: Non antalgic, normal reciprocating heel to toe\par LUMBAR ROM: Flexion full/pain free. Extension and oblique extension limited with axial/radicular Sx.\par PALP: There is no tenderness over the midline spinous processes, paravertebral muscles, SIJ, or greater trochanters bilaterally.\par SPECIAL: SLR and Slump test negative bilaterally. FADIR, MATTHEW negative bilaterally. \par \par Knee\par INSPECTION:  No effusion, discoloration. Decreased patellar mobility and Valgus deformity Left.\par Knee AROM: Full (+) pain end-range Flexion Left.\par PALPATION:  No effusion, warmth. (+) crepitus L>R. (+) TTP L-LJL/patellar facets. No tenderness popliteal fossa, patellar tendon, quad tendon, hamstrings, ITB.  \par SPECIAL:  Medial/Lateral compression (+) bilaterally, Varus/Valgus stress negative bilaterally, Lachman negative bilaterally.
WDL

## 2021-07-07 NOTE — ASSESSMENT
[FreeTextEntry1] : Impression:\par 1. Lumbar Spinal Stenosis\par 2. Bilateral Knee OA\par \par Plan: Review of the history, physical examination, and imaging, the patient's symptoms remain consistent with Lumbar Spinal Stenosis with claudication along with Bilateral Knee DJD. The imaging results and diagnosis were reviewed along with treatment options. The patient is interested in interventional options for symptom reduction, however states that she cannot return for a series of three Euflexxa injections as she is currently in a Rehab facility; I offered repeat Gel-One injection in the office today, please see procedure note for full detail. The GEO again provided significant relief and has now worn off. We will plan for repeat Caudal GEO. The patient expressed verbal understanding and is in agreement with the plan of care. All of the patient's questions and concerns were addressed during today's visit.

## 2021-07-07 NOTE — PROCEDURE
[de-identified] : Procedure: Intra-articular BILATEARL Knee Viscosupplementation Injection (GEL-ONE)\par \par Findings: The LEFT & RIGHT knee XRays reveal tricompartmental degenerative joint disease. \par \par Injectate: 3mL GEL-ONE (Cross-Linked Sodium Hyaluronate) with 1mL (40mg/mL) Kenalog\par \par After risks, benefits and alternatives were discussed and the patient expressed understanding, informed consent was obtained. Risks include but are not limited to bleeding, infection, worsening pain, nerve damage, scar formation. \par \par The LEFT anterior inferomedial area was marked and prepped using Chloraprep. Using a 25G 1.5 inch needle, 2mL of 1% Lidocaine was used to anesthetize the superficial tissue. Then, using an inferomedial approach, a 22-gauge 1.5” needle was introduced into the knee joint and after negative aspiration for blood or synovial fluid, the above injectate was administered needle into the joint space without any resistance. The above procedure was then repeated for the RIGHT knee. \par \par At the conclusion of the procedure the needles were withdrawn. Direct pressure was applied to the area. A Band-Aid was used to cover the injection site. There were no complications during or after the procedure. The patient tolerated the procedure well. Post procedure care instructions and follow up appointment were given. If there are any complications, the patient was instructed to call the office. \par  \par

## 2021-07-19 ENCOUNTER — RX RENEWAL (OUTPATIENT)
Age: 66
End: 2021-07-19

## 2021-07-19 RX ORDER — FLUTICASONE FUROATE, UMECLIDINIUM BROMIDE AND VILANTEROL TRIFENATATE 100; 62.5; 25 UG/1; UG/1; UG/1
100-62.5-25 POWDER RESPIRATORY (INHALATION) DAILY
Qty: 180 | Refills: 3 | Status: ACTIVE | COMMUNITY
Start: 2020-01-15 | End: 1900-01-01

## 2021-07-20 ENCOUNTER — TRANSCRIPTION ENCOUNTER (OUTPATIENT)
Age: 66
End: 2021-07-20

## 2021-07-20 ENCOUNTER — APPOINTMENT (OUTPATIENT)
Dept: PHYSICAL MEDICINE AND REHAB | Facility: CLINIC | Age: 66
End: 2021-07-20
Payer: MEDICARE

## 2021-07-20 DIAGNOSIS — M48.062 SPINAL STENOSIS, LUMBAR REGION WITH NEUROGENIC CLAUDICATION: ICD-10-CM

## 2021-07-20 PROCEDURE — 62323 NJX INTERLAMINAR LMBR/SAC: CPT

## 2021-07-21 ENCOUNTER — APPOINTMENT (OUTPATIENT)
Dept: BREAST CENTER | Facility: CLINIC | Age: 66
End: 2021-07-21

## 2021-07-21 ENCOUNTER — TRANSCRIPTION ENCOUNTER (OUTPATIENT)
Age: 66
End: 2021-07-21

## 2021-07-26 ENCOUNTER — TRANSCRIPTION ENCOUNTER (OUTPATIENT)
Age: 66
End: 2021-07-26

## 2021-08-03 ENCOUNTER — NON-APPOINTMENT (OUTPATIENT)
Age: 66
End: 2021-08-03

## 2021-08-09 ENCOUNTER — TRANSCRIPTION ENCOUNTER (OUTPATIENT)
Age: 66
End: 2021-08-09

## 2021-08-11 ENCOUNTER — APPOINTMENT (OUTPATIENT)
Dept: BREAST CENTER | Facility: CLINIC | Age: 66
End: 2021-08-11

## 2021-08-12 ENCOUNTER — APPOINTMENT (OUTPATIENT)
Dept: INTERNAL MEDICINE | Facility: CLINIC | Age: 66
End: 2021-08-12

## 2021-08-18 ENCOUNTER — NON-APPOINTMENT (OUTPATIENT)
Age: 66
End: 2021-08-18

## 2021-08-20 ENCOUNTER — NON-APPOINTMENT (OUTPATIENT)
Age: 66
End: 2021-08-20

## 2022-04-11 PROBLEM — Z11.59 SCREENING FOR VIRAL DISEASE: Status: ACTIVE | Noted: 2020-06-04

## 2023-03-05 NOTE — ED PROVIDER NOTE - PATIENT PORTAL LINK FT
PAST SURGICAL HISTORY:  History of dental surgery     History of hand surgery      You can access the FollowMyHealth Patient Portal offered by [facility] by registering at the following website: [email or web address]. By joining [facility]’s FollowMyHealth portal, you will also be able to view your health information using other applications (apps) compatible with our system.

## 2023-04-26 NOTE — ED ADULT NURSE NOTE - CAS TRG GENERAL AIRWAY, MLM
----- Message from Katherine Benitez LPN sent at 4/17/2023  9:17 AM CDT -----  Regarding: schedule routine Colon    ----- Message -----  From: Digna Soto  Sent: 4/17/2023   9:05 AM CDT  To: Esteban BLACKWE[facility] Staff    Patient is calling in regards to scheduling  yearly colonoscopy procedure..Please call her back [phone]        Patent

## 2023-07-29 NOTE — ED ADULT NURSE NOTE - COVID-19 RESULT
Discharge instructions reviewed and pt teaching completed. (3) prescription(s) discussed. Pt informed to follow up with PCP regarding today's visit and instructed to report to ED if symptoms return or worsen.         Edith Marc RN  07/28/23 [id number] NEGATIVE

## 2023-08-09 NOTE — ED PROVIDER NOTE - CARE PROVIDERS DIRECT ADDRESSES
,[email or web address] [FreeTextEntry2] : Euflexxa inj R knee

## 2023-10-06 NOTE — ASU PATIENT PROFILE, ADULT - CIGARETTES, PACKS/DAY
I attest my time as attending is greater than 50% of the total combined time spent on qualifying patient care activities by the PA/NP and attending.
I attest my time as attending is greater than 50% of the total combined time spent on qualifying patient care activities by the PA/NP and attending.
1

## 2024-01-03 NOTE — PROGRESS NOTE ADULT - PROVIDER SPECIALTY LIST ADULT
CT Surgery Nurses Note -- 4 Eyes      1/2/2024   10:25 PM      Skin assessed during: Admit      [x] No Altered Skin Integrity Present    [x]Prevention Measures Documented      [] Yes- Altered Skin Integrity Present or Discovered   [] LDA Added if Not in Epic (Describe Wound)   [] New Altered Skin Integrity was Present on Admit and Documented in LDA   [] Wound Image Taken    Wound Care Consulted? No    Attending Nurse:  Kortney Harley RN    Second RN/Staff Member:  Ambika Wiley RN

## 2024-07-29 NOTE — ASU PATIENT PROFILE, ADULT - NS PRO ABUSE SCREEN AFRAID ANYONE YN
[facility]  Colon and Rectal Surgery    [address]  [place], [place]  Tel: [phone]  Fax: [phone]  [email or web address]   MD Apolinar Jean MD Brian Kann, MD W. Forrest Johnston, MD Matthew Giglia, MD Jennifer Paruch, MD William Kethman, MD Danielle Kay, MD     Patient name: Elzbieta Davis   YOB: 1966   MRN: [id number]  Date of procedure: 07/29/2024    Procedure: Colonoscopy  Indications: Screening for colon cancer and Father had CRC, prior polyps    Last colonoscopy: 2019 (Complete)    The patient was informed of the availability of a certified  without charge. A certified  was not necessary for this visit.    Sedation plan: MAC  ASA: ASA 2 - Patient with mild systemic disease with no functional limitations    Review of Systems  See above    Past Medical History:   Diagnosis Date    Abnormal Pap smear of cervix years ago during pregnancy     nl since    ETOH abuse 9/30/2016    Hypertension      Past Surgical History:   Procedure Laterality Date    COLONOSCOPY N/A 9/26/2019    Procedure: COLONOSCOPY;  Surgeon: Juan Pablo Cortez MD;  Location: [facility];  Service: Colon and Rectal;  Laterality: N/A;    ESOPHAGOGASTRODUODENOSCOPY N/A 10/7/2021    Procedure: EGD (ESOPHAGOGASTRODUODENOSCOPY);  Surgeon: Bhavani Bennett MD;  Location: [facility];  Service: Endoscopy;  Laterality: N/A;    ESOPHAGOGASTRODUODENOSCOPY N/A 2/7/2023    Procedure: EGD (ESOPHAGOGASTRODUODENOSCOPY);  Surgeon: Bhavani Bennett MD;  Location: [facility];  Service: Endoscopy;  Laterality: N/A;  EGD w/ WATS 3D    TUBAL LIGATION       Family History   Problem Relation Name Age of Onset    Diabetes Mother      Stroke Father      Cancer Father          Colon Cancer    Colon cancer Father      Ovarian cancer Sister      Breast cancer Neg Hx       Social History     Tobacco Use    Smoking status: Some Days     Types: Cigarettes    Smokeless  tobacco: Never    Tobacco comments:     maybe 2 to 3 a day   Substance Use Topics    Alcohol use: Not Currently     Alcohol/week: 10.0 standard drinks of alcohol     Types: 10 Shots of liquor per week     Comment: sober 2.5 years    Drug use: Yes     Types: Marijuana     Comment: occasional     Review of patient's allergies indicates:   Allergen Reactions    Penicillins Hives and Shortness Of Breath    Sulfa (sulfonamide antibiotics) Hives and Shortness Of Breath    Ciprofloxacin Nausea And Vomiting       Prior to Admission medications    Medication Sig Start Date End Date Taking? Authorizing Provider   baclofen (LIORESAL) 10 MG tablet Take 1 tablet (10 mg total) by mouth every evening. 5/10/24 5/10/25  Evelyne Alvarez MD   bisacodyL (DULCOLAX) 5 mg EC tablet Take 4 tablets (20 mg total) by mouth once. for 1 dose 7/28/24 7/28/24  Vernon Lopez MD   cyanocobalamin, vitamin B-12, 2,500 mcg Tab Take 1 tablet by mouth once daily.    Provider, Historical   folic acid (FOLVITE) 1 MG tablet TAKE ONE TABLET BY MOUTH EVERY DAY 5/21/24   Evelyne Alvarez MD   losartan (COZAAR) 100 MG tablet Take 1 tablet (100 mg total) by mouth once daily. 7/15/24 7/15/25  Evelyne Alvarez MD   pantoprazole (PROTONIX) 40 MG tablet Take 1 tablet (40 mg total) by mouth once daily. 7/11/23   Evelyne Alvarez MD   sod picosulf-mag ox-citric ac (CLENPIQ) 10 mg-3.5 gram- 12 gram/175 mL Soln Take 2 Bottles by mouth once. for 1 dose 7/28/24 7/28/24  Vernon Lopez MD   thiamine mononitrate, vit B1, (VITAMIN B-1, MONONITRATE,) 100 mg Tab Take 1 tablet (100 mg total) by mouth once daily. 12/7/22   Evelyne Alvarez MD       Physical Examination  /79   Pulse 65   Temp 97.4 °F (36.3 °C) (Skin)   Resp 18   SpO2 99%   Breastfeeding No      Constitutional: well developed, no cough, no dyspnea, alert, and no acute distress    Head: Normocephalic, no lesions, without obvious abnormality  Eye: Normal external eye, conjunctiva, and lids,  PERRL  Cardiovascular: regular rate and regular rhythm  Respiratory: normal air entry  Gastrointestinal: soft, non-tender, without masses or organomegaly  Neurologic: alert, oriented, normal speech, no focal findings or movement disorder noted  Psychiatric: appropriate, normal mood    Plan of Care    It was a pleasure meeting Ms. Davis today - we will plan to perform a colonoscopy with monitored anesthesia care. The details of the procedure, the possible need for biopsy or polypectomy and the potential risks including bleeding, perforation, missed polyps were discussed in detail and they consented to undergo the procedure.      Vernon Lopez MD, FACS, FASCRS  Department of Colon & Rectal Surgery  [facility]     no